# Patient Record
Sex: MALE | Race: WHITE | NOT HISPANIC OR LATINO | Employment: FULL TIME | URBAN - METROPOLITAN AREA
[De-identification: names, ages, dates, MRNs, and addresses within clinical notes are randomized per-mention and may not be internally consistent; named-entity substitution may affect disease eponyms.]

---

## 2017-12-04 ENCOUNTER — HOSPITAL ENCOUNTER (EMERGENCY)
Facility: HOSPITAL | Age: 47
Discharge: HOME | End: 2017-12-04
Attending: EMERGENCY MEDICINE | Admitting: EMERGENCY MEDICINE
Payer: COMMERCIAL

## 2017-12-04 ENCOUNTER — APPOINTMENT (EMERGENCY)
Dept: RADIOLOGY | Facility: HOSPITAL | Age: 47
End: 2017-12-04
Payer: COMMERCIAL

## 2017-12-04 VITALS
OXYGEN SATURATION: 97 % | DIASTOLIC BLOOD PRESSURE: 97 MMHG | SYSTOLIC BLOOD PRESSURE: 186 MMHG | RESPIRATION RATE: 18 BRPM | TEMPERATURE: 98 F | HEART RATE: 85 BPM

## 2017-12-04 DIAGNOSIS — S93.601A SPRAIN OF RIGHT FOOT, INITIAL ENCOUNTER: Primary | ICD-10-CM

## 2017-12-04 PROCEDURE — 73630 X-RAY EXAM OF FOOT: CPT

## 2017-12-04 PROCEDURE — 99283 EMERGENCY DEPT VISIT LOW MDM: CPT

## 2017-12-04 RX ORDER — IBUPROFEN 400 MG/1
400 TABLET ORAL ONCE
Status: DISCONTINUED | OUTPATIENT
Start: 2017-12-04 | End: 2017-12-04 | Stop reason: HOSPADM

## 2017-12-04 NOTE — DISCHARGE INSTRUCTIONS
Foot Sprain   WHAT YOU NEED TO KNOW:   A foot sprain is caused by a stretched or torn ligament in the foot or toe  Ligaments are tough tissues that connect bones  DISCHARGE INSTRUCTIONS:   Seek care immediately if:   · You have numbness or tingling below the injury, such as in your toes  · The skin on your injured foot is blue or pale  · You have increased pain, even after you take pain medicine  Contact your healthcare provider if:   · You have new weakness in your foot  · You have new or increased swelling in your foot  · You have new or increased stiffness when you move your injured foot  · You have questions or concerns about your condition or care  Medicines:   · NSAIDs , such as ibuprofen, help decrease swelling, pain, and fever  This medicine is available with or without a doctor's order  NSAIDs can cause stomach bleeding or kidney problems in certain people  If you take blood thinner medicine, always ask if NSAIDs are safe for you  Always read the medicine label and follow directions  Do not give these medicines to children under 10months of age without direction from your child's healthcare provider  · Take your medicine as directed  Contact your healthcare provider if you think your medicine is not helping or if you have side effects  Tell him of her if you are allergic to any medicine  Keep a list of the medicines, vitamins, and herbs you take  Include the amounts, and when and why you take them  Bring the list or the pill bottles to follow-up visits  Carry your medicine list with you in case of an emergency  Self-care:   · Rest your foot  Limit movement in your sprained foot for the first 2 to 3 days  You might need crutches to take weight off your injured foot as it heals  Use crutches as directed  · Apply ice  on your foot for 15 to 20 minutes every hour or as directed  Use an ice pack, or put crushed ice in a plastic bag  Cover it with a towel   Ice helps prevent tissue damage and decreases swelling and pain  · Compress your foot  You may need to use tape or an elastic bandage to support your foot if you have a mild sprain  You may need a splint on your foot for support if your sprain is severe  Wear your splint for as many days as directed  · Elevate your foot  above the level of your heart as often as you can  This will help decrease swelling and pain  Prop your foot on pillows or blankets to keep it elevated comfortably  Exercise your foot:  You may be given exercises to improve your strength and to help decrease stiffness  The exercises and physical therapy can help restore strength and increase the range of motion in your foot  Ask your healthcare provider when you can return to your normal activities or play sports  Prevent another foot sprain:   · Warm up and stretch before you exercise  · Do not exercise when you feel pain or are tired  · Wear equipment to protect yourself when you play sports  Follow up with your healthcare provider as directed:  Write down your questions so you remember to ask them during your visits  © 2017 [facility] Information is for End User's use only and may not be sold, redistributed or otherwise used for commercial purposes  All illustrations and images included in CareNotes® are the copyrighted property of A D A M [employer] Inc  or Philippe Ennis  The above information is an  only  It is not intended as medical advice for individual conditions or treatments  Talk to your doctor, nurse or pharmacist before following any medical regimen to see if it is safe and effective for you

## 2017-12-04 NOTE — ED PROVIDER NOTES
History  Chief Complaint   Patient presents with    Foot Injury     pt rolled his right foot this morning  Here with some pain  History provided by:  Patient and relative  Foot Injury - Major   Location:  Foot  Time since incident:  2 hours  Injury: yes    Mechanism of injury comment:  Walking, took a wrong step, inversion injury of the foot  Foot location:  R foot  Pain details:     Quality:  Dull    Radiates to:  Does not radiate    Severity:  Moderate    Onset quality:  Sudden    Duration:  2 hours    Timing:  Constant    Progression:  Improving  Chronicity:  New  Dislocation: no    Prior injury to area:  No  Relieved by:  Nothing  Worsened by:  Bearing weight and activity  Ineffective treatments:  None tried  Associated symptoms: no decreased ROM, no fever, no neck pain, no numbness and no stiffness        None       Past Medical History:   Diagnosis Date    Hypertension        History reviewed  No pertinent surgical history  History reviewed  No pertinent family history  I have reviewed and agree with the history as documented  Social History   Substance Use Topics    Smoking status: Current Every Day Smoker    Smokeless tobacco: Never Used    Alcohol use Yes        Review of Systems   Constitutional: Negative for fever  Respiratory: Negative for chest tightness and shortness of breath  Cardiovascular: Negative for chest pain  Musculoskeletal: Negative for neck pain and stiffness  Skin: Negative for rash  Neurological: Negative for dizziness, light-headedness and headaches         Physical Exam  ED Triage Vitals [12/04/17 0919]   Temperature Pulse Respirations Blood Pressure SpO2   98 °F (36 7 °C) 85 18 (!) 186/97 97 %      Temp Source Heart Rate Source Patient Position - Orthostatic VS BP Location FiO2 (%)   Oral Monitor Sitting Left arm --      Pain Score       9           Orthostatic Vital Signs  Vitals:    12/04/17 0919   BP: (!) 186/97   Pulse: 85   Patient Position - Orthostatic VS: Sitting       Physical Exam   Constitutional: He appears well-developed  HENT:   Head: Atraumatic  Eyes: Conjunctivae are normal  Right eye exhibits no discharge  Left eye exhibits no discharge  No scleral icterus  Neck: Neck supple  No tracheal deviation present  Pulmonary/Chest: Effort normal  No stridor  No respiratory distress  Musculoskeletal: He exhibits no deformity  Swelling ecchymosis and tenderness along lateral aspect of right foot, no focal bony tenderness only tender over ecchymotic area, no malleolar tenderness no proximal fibular tenderness   Neurological: He is alert  He exhibits normal muscle tone  Coordination normal    Skin: Skin is warm and dry  No rash noted  No erythema  No pallor  Psychiatric: He has a normal mood and affect  Vitals reviewed  ED Medications  Medications - No data to display    Diagnostic Studies  Results Reviewed     None                 XR foot 3+ views RIGHT   Final Result by Matt Roca MD (12/04 1710)      No acute osseous abnormality           Workstation performed: [id number]                    Procedures  Procedures       Phone Contacts  ED Phone Contact    ED Course  ED Course                                MDM  Number of Diagnoses or Management Options  Sprain of right foot, initial encounter: new and requires workup  Diagnosis management comments:  50-year-old male, inversion injury to the foot a few hours prior to arrival, large amount of swelling and ecchymosis, will x-ray to evaluate for fracture       Amount and/or Complexity of Data Reviewed  Tests in the radiology section of CPT®: ordered and reviewed  Decide to obtain previous medical records or to obtain history from someone other than the patient: yes  Obtain history from someone other than the patient: yes  Review and summarize past medical records: yes  Independent visualization of images, tracings, or specimens: yes      CritCare Time    Disposition  Final diagnoses: Sprain of right foot, initial encounter     Time reflects when diagnosis was documented in both MDM as applicable and the Disposition within this note     Time User Action Codes Description Comment    12/4/2017 10:03 AM Mariak Morrissey 206 Sprain of right foot, initial encounter       ED Disposition     ED Disposition Condition Comment    Discharge  [facility] discharge to home/self care  Condition at discharge: Good        Follow-up Information    None       There are no discharge medications for this patient  No discharge procedures on file      ED Provider  Electronically Signed by           Isabel Short DO  12/04/17 5818

## 2017-12-13 ENCOUNTER — HOSPITAL ENCOUNTER (INPATIENT)
Facility: HOSPITAL | Age: 47
LOS: 1 days | Discharge: HOME | DRG: 439 | End: 2017-12-15
Attending: EMERGENCY MEDICINE | Admitting: INTERNAL MEDICINE
Payer: COMMERCIAL

## 2017-12-13 ENCOUNTER — APPOINTMENT (EMERGENCY)
Dept: RADIOLOGY | Facility: HOSPITAL | Age: 47
DRG: 439 | End: 2017-12-13
Payer: COMMERCIAL

## 2017-12-13 ENCOUNTER — APPOINTMENT (EMERGENCY)
Dept: CT IMAGING | Facility: HOSPITAL | Age: 47
DRG: 439 | End: 2017-12-13
Payer: COMMERCIAL

## 2017-12-13 DIAGNOSIS — E87.1 HYPONATREMIA: ICD-10-CM

## 2017-12-13 DIAGNOSIS — K85.90 PANCREATITIS: Primary | ICD-10-CM

## 2017-12-13 DIAGNOSIS — K62.5 RECTAL BLEEDING: ICD-10-CM

## 2017-12-13 DIAGNOSIS — K52.9 ENTERITIS: ICD-10-CM

## 2017-12-13 DIAGNOSIS — K29.80 DUODENITIS: ICD-10-CM

## 2017-12-13 LAB
ALBUMIN SERPL BCP-MCNC: 3.7 G/DL (ref 3.5–5)
ALP SERPL-CCNC: 103 U/L (ref 46–116)
ALT SERPL W P-5'-P-CCNC: 33 U/L (ref 12–78)
ANION GAP SERPL CALCULATED.3IONS-SCNC: 12 MMOL/L (ref 4–13)
APTT PPP: 33 SECONDS (ref 23–35)
AST SERPL W P-5'-P-CCNC: 21 U/L (ref 5–45)
BACTERIA UR QL AUTO: ABNORMAL /HPF
BASOPHILS # BLD AUTO: 0.04 THOUSANDS/ΜL (ref 0–0.1)
BASOPHILS NFR BLD AUTO: 0 % (ref 0–1)
BILIRUB SERPL-MCNC: 1 MG/DL (ref 0.2–1)
BILIRUB UR QL STRIP: ABNORMAL
BUN SERPL-MCNC: 11 MG/DL (ref 5–25)
CALCIUM SERPL-MCNC: 8.6 MG/DL (ref 8.3–10.1)
CHLORIDE SERPL-SCNC: 89 MMOL/L (ref 100–108)
CK SERPL-CCNC: 95 U/L (ref 39–308)
CLARITY UR: CLEAR
CO2 SERPL-SCNC: 23 MMOL/L (ref 21–32)
COLOR UR: YELLOW
CREAT SERPL-MCNC: 0.81 MG/DL (ref 0.6–1.3)
EOSINOPHIL # BLD AUTO: 0.09 THOUSAND/ΜL (ref 0–0.61)
EOSINOPHIL NFR BLD AUTO: 1 % (ref 0–6)
ERYTHROCYTE [DISTWIDTH] IN BLOOD BY AUTOMATED COUNT: 13.2 % (ref 11.6–15.1)
ETHANOL SERPL-MCNC: <3 MG/DL (ref 0–3)
GFR SERPL CREATININE-BSD FRML MDRD: 107 ML/MIN/1.73SQ M
GLUCOSE SERPL-MCNC: 128 MG/DL (ref 65–140)
GLUCOSE UR STRIP-MCNC: NEGATIVE MG/DL
HCT VFR BLD AUTO: 44.2 % (ref 36.5–49.3)
HGB BLD-MCNC: 15.2 G/DL (ref 12–17)
HGB UR QL STRIP.AUTO: ABNORMAL
INR PPP: 0.88 (ref 0.86–1.16)
KETONES UR STRIP-MCNC: NEGATIVE MG/DL
LACTATE SERPL-SCNC: <0.3 MMOL/L (ref 0.5–2)
LEUKOCYTE ESTERASE UR QL STRIP: NEGATIVE
LIPASE SERPL-CCNC: 425 U/L (ref 73–393)
LYMPHOCYTES # BLD AUTO: 1.95 THOUSANDS/ΜL (ref 0.6–4.47)
LYMPHOCYTES NFR BLD AUTO: 14 % (ref 14–44)
MCH RBC QN AUTO: 31.4 PG (ref 26.8–34.3)
MCHC RBC AUTO-ENTMCNC: 34.4 G/DL (ref 31.4–37.4)
MCV RBC AUTO: 91 FL (ref 82–98)
MONOCYTES # BLD AUTO: 0.78 THOUSAND/ΜL (ref 0.17–1.22)
MONOCYTES NFR BLD AUTO: 6 % (ref 4–12)
MUCOUS THREADS UR QL AUTO: ABNORMAL
NEUTROPHILS # BLD AUTO: 11.11 THOUSANDS/ΜL (ref 1.85–7.62)
NEUTS SEG NFR BLD AUTO: 79 % (ref 43–75)
NITRITE UR QL STRIP: NEGATIVE
NON-SQ EPI CELLS URNS QL MICRO: ABNORMAL /HPF
PH UR STRIP.AUTO: 5.5 [PH] (ref 4.5–8)
PLATELET # BLD AUTO: 283 THOUSANDS/UL (ref 149–390)
PMV BLD AUTO: 10.1 FL (ref 8.9–12.7)
POTASSIUM SERPL-SCNC: 3.3 MMOL/L (ref 3.5–5.3)
PROT SERPL-MCNC: 8.2 G/DL (ref 6.4–8.2)
PROT UR STRIP-MCNC: ABNORMAL MG/DL
PROTHROMBIN TIME: 12.2 SECONDS (ref 12.1–14.4)
RBC # BLD AUTO: 4.84 MILLION/UL (ref 3.88–5.62)
RBC #/AREA URNS AUTO: ABNORMAL /HPF
SODIUM SERPL-SCNC: 124 MMOL/L (ref 136–145)
SP GR UR STRIP.AUTO: >=1.03 (ref 1–1.03)
UROBILINOGEN UR QL STRIP.AUTO: 0.2 E.U./DL
WBC # BLD AUTO: 13.97 THOUSAND/UL (ref 4.31–10.16)
WBC #/AREA URNS AUTO: ABNORMAL /HPF

## 2017-12-13 PROCEDURE — 74177 CT ABD & PELVIS W/CONTRAST: CPT

## 2017-12-13 PROCEDURE — 80053 COMPREHEN METABOLIC PANEL: CPT | Performed by: EMERGENCY MEDICINE

## 2017-12-13 PROCEDURE — 85730 THROMBOPLASTIN TIME PARTIAL: CPT | Performed by: EMERGENCY MEDICINE

## 2017-12-13 PROCEDURE — 71020 HB CHEST X-RAY 2VW FRONTAL&LATL: CPT

## 2017-12-13 PROCEDURE — 93005 ELECTROCARDIOGRAM TRACING: CPT

## 2017-12-13 PROCEDURE — 82550 ASSAY OF CK (CPK): CPT | Performed by: EMERGENCY MEDICINE

## 2017-12-13 PROCEDURE — 83690 ASSAY OF LIPASE: CPT | Performed by: EMERGENCY MEDICINE

## 2017-12-13 PROCEDURE — 36415 COLL VENOUS BLD VENIPUNCTURE: CPT | Performed by: EMERGENCY MEDICINE

## 2017-12-13 PROCEDURE — 81001 URINALYSIS AUTO W/SCOPE: CPT | Performed by: EMERGENCY MEDICINE

## 2017-12-13 PROCEDURE — 96374 THER/PROPH/DIAG INJ IV PUSH: CPT

## 2017-12-13 PROCEDURE — 83605 ASSAY OF LACTIC ACID: CPT | Performed by: EMERGENCY MEDICINE

## 2017-12-13 PROCEDURE — 85610 PROTHROMBIN TIME: CPT | Performed by: EMERGENCY MEDICINE

## 2017-12-13 PROCEDURE — 85025 COMPLETE CBC W/AUTO DIFF WBC: CPT | Performed by: EMERGENCY MEDICINE

## 2017-12-13 PROCEDURE — 96361 HYDRATE IV INFUSION ADD-ON: CPT

## 2017-12-13 PROCEDURE — 96375 TX/PRO/DX INJ NEW DRUG ADDON: CPT

## 2017-12-13 PROCEDURE — 84300 ASSAY OF URINE SODIUM: CPT | Performed by: PHYSICIAN ASSISTANT

## 2017-12-13 PROCEDURE — 80320 DRUG SCREEN QUANTALCOHOLS: CPT | Performed by: EMERGENCY MEDICINE

## 2017-12-13 PROCEDURE — 93005 ELECTROCARDIOGRAM TRACING: CPT | Performed by: EMERGENCY MEDICINE

## 2017-12-13 RX ORDER — SODIUM CHLORIDE 9 MG/ML
125 INJECTION, SOLUTION INTRAVENOUS CONTINUOUS
Status: DISCONTINUED | OUTPATIENT
Start: 2017-12-13 | End: 2017-12-14 | Stop reason: HOSPADM

## 2017-12-13 RX ORDER — ONDANSETRON 2 MG/ML
4 INJECTION INTRAMUSCULAR; INTRAVENOUS ONCE
Status: COMPLETED | OUTPATIENT
Start: 2017-12-13 | End: 2017-12-13

## 2017-12-13 RX ADMIN — HYDROMORPHONE HYDROCHLORIDE 0.5 MG: 1 INJECTION, SOLUTION INTRAMUSCULAR; INTRAVENOUS; SUBCUTANEOUS at 22:38

## 2017-12-13 RX ADMIN — ONDANSETRON 4 MG: 2 INJECTION INTRAMUSCULAR; INTRAVENOUS at 22:36

## 2017-12-13 RX ADMIN — SODIUM CHLORIDE 1000 ML: 0.9 INJECTION, SOLUTION INTRAVENOUS at 22:36

## 2017-12-13 NOTE — LETTER
December 15, 2017     Patient: Pham Overton [facility]   YOB: 1970   Date of Visit: 12/13/2017       To Whom It May Concern:    Please note, this patient was admitted from 12/13/17-12/15/17 and did receive short acting narcotics during this hospitalization for acute pain  If you have any questions or concerns, please don't hesitate to call           Sincerely,                  Tanya West PA-C

## 2017-12-14 ENCOUNTER — APPOINTMENT (INPATIENT)
Dept: ULTRASOUND IMAGING | Facility: HOSPITAL | Age: 47
DRG: 439 | End: 2017-12-14
Payer: COMMERCIAL

## 2017-12-14 PROBLEM — E87.1 HYPONATREMIA: Status: ACTIVE | Noted: 2017-12-14

## 2017-12-14 PROBLEM — K29.80 DUODENITIS: Status: ACTIVE | Noted: 2017-12-14

## 2017-12-14 PROBLEM — K52.9 ENTERITIS: Status: ACTIVE | Noted: 2017-12-14

## 2017-12-14 PROBLEM — K85.90 ACUTE PANCREATITIS: Status: ACTIVE | Noted: 2017-12-14

## 2017-12-14 LAB
ALBUMIN SERPL BCP-MCNC: 3.2 G/DL (ref 3.5–5)
ALP SERPL-CCNC: 97 U/L (ref 46–116)
ALT SERPL W P-5'-P-CCNC: 28 U/L (ref 12–78)
ANION GAP SERPL CALCULATED.3IONS-SCNC: 10 MMOL/L (ref 4–13)
AST SERPL W P-5'-P-CCNC: 18 U/L (ref 5–45)
ATRIAL RATE: 89 BPM
BILIRUB SERPL-MCNC: 0.8 MG/DL (ref 0.2–1)
BUN SERPL-MCNC: 8 MG/DL (ref 5–25)
CALCIUM SERPL-MCNC: 8.1 MG/DL (ref 8.3–10.1)
CHLORIDE SERPL-SCNC: 95 MMOL/L (ref 100–108)
CO2 SERPL-SCNC: 24 MMOL/L (ref 21–32)
CORTIS AM PEAK SERPL-MCNC: 26.2 UG/DL (ref 4.2–22.4)
CREAT SERPL-MCNC: 0.77 MG/DL (ref 0.6–1.3)
ERYTHROCYTE [DISTWIDTH] IN BLOOD BY AUTOMATED COUNT: 13.3 % (ref 11.6–15.1)
GFR SERPL CREATININE-BSD FRML MDRD: 109 ML/MIN/1.73SQ M
GLUCOSE SERPL-MCNC: 123 MG/DL (ref 65–140)
HCT VFR BLD AUTO: 43.3 % (ref 36.5–49.3)
HGB BLD-MCNC: 14.6 G/DL (ref 12–17)
LIPASE SERPL-CCNC: 361 U/L (ref 73–393)
MCH RBC QN AUTO: 31.3 PG (ref 26.8–34.3)
MCHC RBC AUTO-ENTMCNC: 33.7 G/DL (ref 31.4–37.4)
MCV RBC AUTO: 93 FL (ref 82–98)
OSMOLALITY UR/SERPL-RTO: 290 MMOL/KG (ref 282–298)
P AXIS: 55 DEGREES
PLATELET # BLD AUTO: 269 THOUSANDS/UL (ref 149–390)
PMV BLD AUTO: 10.3 FL (ref 8.9–12.7)
POTASSIUM SERPL-SCNC: 3.2 MMOL/L (ref 3.5–5.3)
PR INTERVAL: 156 MS
PROT SERPL-MCNC: 7.4 G/DL (ref 6.4–8.2)
QRS AXIS: 38 DEGREES
QRSD INTERVAL: 96 MS
QT INTERVAL: 518 MS
QTC INTERVAL: 617 MS
RBC # BLD AUTO: 4.66 MILLION/UL (ref 3.88–5.62)
SODIUM 24H UR-SCNC: 45 MOL/L
SODIUM SERPL-SCNC: 129 MMOL/L (ref 136–145)
T WAVE AXIS: 44 DEGREES
VENTRICULAR RATE: 85 BPM
WBC # BLD AUTO: 12.76 THOUSAND/UL (ref 4.31–10.16)

## 2017-12-14 PROCEDURE — 80053 COMPREHEN METABOLIC PANEL: CPT | Performed by: PHYSICIAN ASSISTANT

## 2017-12-14 PROCEDURE — 87040 BLOOD CULTURE FOR BACTERIA: CPT | Performed by: EMERGENCY MEDICINE

## 2017-12-14 PROCEDURE — 83690 ASSAY OF LIPASE: CPT | Performed by: PHYSICIAN ASSISTANT

## 2017-12-14 PROCEDURE — 96376 TX/PRO/DX INJ SAME DRUG ADON: CPT

## 2017-12-14 PROCEDURE — 85027 COMPLETE CBC AUTOMATED: CPT | Performed by: PHYSICIAN ASSISTANT

## 2017-12-14 PROCEDURE — 36415 COLL VENOUS BLD VENIPUNCTURE: CPT | Performed by: EMERGENCY MEDICINE

## 2017-12-14 PROCEDURE — 99285 EMERGENCY DEPT VISIT HI MDM: CPT

## 2017-12-14 PROCEDURE — 82533 TOTAL CORTISOL: CPT | Performed by: PHYSICIAN ASSISTANT

## 2017-12-14 PROCEDURE — C9113 INJ PANTOPRAZOLE SODIUM, VIA: HCPCS | Performed by: EMERGENCY MEDICINE

## 2017-12-14 PROCEDURE — 83930 ASSAY OF BLOOD OSMOLALITY: CPT | Performed by: PHYSICIAN ASSISTANT

## 2017-12-14 PROCEDURE — 76705 ECHO EXAM OF ABDOMEN: CPT

## 2017-12-14 RX ORDER — SENNOSIDES 8.6 MG
1 TABLET ORAL DAILY
Status: DISCONTINUED | OUTPATIENT
Start: 2017-12-14 | End: 2017-12-15 | Stop reason: HOSPADM

## 2017-12-14 RX ORDER — ACETAMINOPHEN 325 MG/1
650 TABLET ORAL EVERY 6 HOURS PRN
Status: DISCONTINUED | OUTPATIENT
Start: 2017-12-14 | End: 2017-12-14 | Stop reason: SDUPTHER

## 2017-12-14 RX ORDER — ATORVASTATIN CALCIUM 10 MG/1
10 TABLET, FILM COATED ORAL DAILY
COMMUNITY

## 2017-12-14 RX ORDER — PANTOPRAZOLE SODIUM 40 MG/1
40 INJECTION, POWDER, FOR SOLUTION INTRAVENOUS ONCE
Status: COMPLETED | OUTPATIENT
Start: 2017-12-14 | End: 2017-12-14

## 2017-12-14 RX ORDER — LANSOPRAZOLE 30 MG/1
25 CAPSULE, DELAYED RELEASE ORAL DAILY
COMMUNITY

## 2017-12-14 RX ORDER — CALCIUM CARBONATE 200(500)MG
1000 TABLET,CHEWABLE ORAL DAILY PRN
Status: DISCONTINUED | OUTPATIENT
Start: 2017-12-14 | End: 2017-12-15 | Stop reason: HOSPADM

## 2017-12-14 RX ORDER — LISINOPRIL AND HYDROCHLOROTHIAZIDE 25; 20 MG/1; MG/1
1 TABLET ORAL DAILY
COMMUNITY
End: 2017-12-15 | Stop reason: HOSPADM

## 2017-12-14 RX ORDER — POTASSIUM CHLORIDE AND SODIUM CHLORIDE 900; 300 MG/100ML; MG/100ML
125 INJECTION, SOLUTION INTRAVENOUS CONTINUOUS
Status: DISCONTINUED | OUTPATIENT
Start: 2017-12-14 | End: 2017-12-14

## 2017-12-14 RX ORDER — ACETAMINOPHEN 325 MG/1
650 TABLET ORAL EVERY 6 HOURS PRN
Status: DISCONTINUED | OUTPATIENT
Start: 2017-12-14 | End: 2017-12-15 | Stop reason: HOSPADM

## 2017-12-14 RX ORDER — ATORVASTATIN CALCIUM 10 MG/1
10 TABLET, FILM COATED ORAL
Status: DISCONTINUED | OUTPATIENT
Start: 2017-12-14 | End: 2017-12-15 | Stop reason: HOSPADM

## 2017-12-14 RX ORDER — OXYCODONE HYDROCHLORIDE 5 MG/1
5 TABLET ORAL EVERY 4 HOURS PRN
Status: DISCONTINUED | OUTPATIENT
Start: 2017-12-14 | End: 2017-12-15 | Stop reason: HOSPADM

## 2017-12-14 RX ORDER — CIPROFLOXACIN 2 MG/ML
400 INJECTION, SOLUTION INTRAVENOUS ONCE
Status: COMPLETED | OUTPATIENT
Start: 2017-12-14 | End: 2017-12-14

## 2017-12-14 RX ORDER — LISINOPRIL 20 MG/1
20 TABLET ORAL DAILY
Status: DISCONTINUED | OUTPATIENT
Start: 2017-12-14 | End: 2017-12-15 | Stop reason: HOSPADM

## 2017-12-14 RX ORDER — NICOTINE 21 MG/24HR
1 PATCH, TRANSDERMAL 24 HOURS TRANSDERMAL DAILY
Status: DISCONTINUED | OUTPATIENT
Start: 2017-12-14 | End: 2017-12-15 | Stop reason: HOSPADM

## 2017-12-14 RX ORDER — TRAMADOL HYDROCHLORIDE 50 MG/1
50 TABLET ORAL EVERY 6 HOURS PRN
Status: DISCONTINUED | OUTPATIENT
Start: 2017-12-14 | End: 2017-12-15 | Stop reason: HOSPADM

## 2017-12-14 RX ORDER — ONDANSETRON 2 MG/ML
4 INJECTION INTRAMUSCULAR; INTRAVENOUS EVERY 4 HOURS PRN
Status: DISCONTINUED | OUTPATIENT
Start: 2017-12-14 | End: 2017-12-15 | Stop reason: HOSPADM

## 2017-12-14 RX ORDER — ONDANSETRON 2 MG/ML
4 INJECTION INTRAMUSCULAR; INTRAVENOUS EVERY 6 HOURS PRN
Status: DISCONTINUED | OUTPATIENT
Start: 2017-12-14 | End: 2017-12-14 | Stop reason: SDUPTHER

## 2017-12-14 RX ORDER — DICYCLOMINE HYDROCHLORIDE 10 MG/1
20 CAPSULE ORAL
Status: DISCONTINUED | OUTPATIENT
Start: 2017-12-14 | End: 2017-12-15 | Stop reason: HOSPADM

## 2017-12-14 RX ORDER — SODIUM CHLORIDE 9 MG/ML
125 INJECTION, SOLUTION INTRAVENOUS CONTINUOUS
Status: DISCONTINUED | OUTPATIENT
Start: 2017-12-14 | End: 2017-12-14

## 2017-12-14 RX ADMIN — OXYCODONE HYDROCHLORIDE 5 MG: 5 TABLET ORAL at 20:52

## 2017-12-14 RX ADMIN — PANTOPRAZOLE SODIUM 40 MG: 40 INJECTION, POWDER, FOR SOLUTION INTRAVENOUS at 01:27

## 2017-12-14 RX ADMIN — DICYCLOMINE HYDROCHLORIDE 20 MG: 10 CAPSULE ORAL at 15:44

## 2017-12-14 RX ADMIN — SENNOSIDES 8.6 MG: 8.6 TABLET, FILM COATED ORAL at 08:00

## 2017-12-14 RX ADMIN — OXYCODONE HYDROCHLORIDE 5 MG: 5 TABLET ORAL at 08:00

## 2017-12-14 RX ADMIN — IOHEXOL 100 ML: 350 INJECTION, SOLUTION INTRAVENOUS at 00:12

## 2017-12-14 RX ADMIN — HYDROMORPHONE HYDROCHLORIDE 0.5 MG: 1 INJECTION, SOLUTION INTRAMUSCULAR; INTRAVENOUS; SUBCUTANEOUS at 11:33

## 2017-12-14 RX ADMIN — OXYCODONE HYDROCHLORIDE 5 MG: 5 TABLET ORAL at 14:45

## 2017-12-14 RX ADMIN — HYDROMORPHONE HYDROCHLORIDE 0.5 MG: 1 INJECTION, SOLUTION INTRAMUSCULAR; INTRAVENOUS; SUBCUTANEOUS at 17:54

## 2017-12-14 RX ADMIN — CIPROFLOXACIN 400 MG: 2 INJECTION, SOLUTION INTRAVENOUS at 02:05

## 2017-12-14 RX ADMIN — LISINOPRIL 20 MG: 20 TABLET ORAL at 14:42

## 2017-12-14 RX ADMIN — OXYCODONE HYDROCHLORIDE 5 MG: 5 TABLET ORAL at 02:42

## 2017-12-14 RX ADMIN — DICYCLOMINE HYDROCHLORIDE 20 MG: 10 CAPSULE ORAL at 21:53

## 2017-12-14 RX ADMIN — DICYCLOMINE HYDROCHLORIDE 20 MG: 10 CAPSULE ORAL at 06:03

## 2017-12-14 RX ADMIN — METOPROLOL TARTRATE 25 MG: 25 TABLET ORAL at 20:52

## 2017-12-14 RX ADMIN — METRONIDAZOLE 500 MG: 500 INJECTION, SOLUTION INTRAVENOUS at 01:34

## 2017-12-14 RX ADMIN — METOPROLOL TARTRATE 25 MG: 25 TABLET ORAL at 14:42

## 2017-12-14 RX ADMIN — HYDROMORPHONE HYDROCHLORIDE 0.5 MG: 1 INJECTION, SOLUTION INTRAMUSCULAR; INTRAVENOUS; SUBCUTANEOUS at 23:22

## 2017-12-14 RX ADMIN — DICYCLOMINE HYDROCHLORIDE 20 MG: 10 CAPSULE ORAL at 11:33

## 2017-12-14 RX ADMIN — HYDROMORPHONE HYDROCHLORIDE 0.5 MG: 1 INJECTION, SOLUTION INTRAMUSCULAR; INTRAVENOUS; SUBCUTANEOUS at 04:39

## 2017-12-14 RX ADMIN — HYDROMORPHONE HYDROCHLORIDE 1 MG: 1 INJECTION, SOLUTION INTRAMUSCULAR; INTRAVENOUS; SUBCUTANEOUS at 00:44

## 2017-12-14 RX ADMIN — ONDANSETRON 4 MG: 2 INJECTION INTRAMUSCULAR; INTRAVENOUS at 02:42

## 2017-12-14 RX ADMIN — ATORVASTATIN CALCIUM 10 MG: 10 TABLET, FILM COATED ORAL at 15:44

## 2017-12-14 RX ADMIN — SODIUM CHLORIDE 125 ML/HR: 0.9 INJECTION, SOLUTION INTRAVENOUS at 01:37

## 2017-12-14 RX ADMIN — POTASSIUM CHLORIDE AND SODIUM CHLORIDE 125 ML/HR: 900; 300 INJECTION, SOLUTION INTRAVENOUS at 10:11

## 2017-12-14 RX ADMIN — POTASSIUM CHLORIDE: 2 INJECTION, SOLUTION, CONCENTRATE INTRAVENOUS at 17:54

## 2017-12-14 NOTE — H&P
H&P- Rex Marvin 1970, 56 y o  male MRN: [id number]    Unit/Bed#: -01 Encounter: [id number]    Primary Care Provider: Jimmy Hernandez MD   Date and time admitted to hospital: 12/13/2017  9:08 PM    Acute pancreatitis   Assessment & Plan    · Mildly elevated lipase  · CT with some fat stranding of pancreatic head-- pancreatitis/duodenitis noted  · Clear liquid diet  · IVF  · Pain control         * Enteritis   Assessment & Plan    · Received Cipro + Flagyl in ED  · Patient also with BRBPR x1, which he states is new to him  · Repeat CBC in AM to monitor hgb  · Consider GI consultation        Hyponatremia   Assessment & Plan    ·  today, no previous levels; admits to vomiting today  · Has hx of excessive ETOH use, not ETOH in system today  · Check serum and urine studies  · IVF hydration  · Repeat BMP In AM         Hypertension   Assessment & Plan    · Continue home medications          VTE Prophylaxis: low risk, pharm PPX not needed  / sequential compression device   Code Status: Level 1-- Full Code  POLST: POLST form is not discussed and not completed at this time  Anticipated Length of Stay:  Patient will be admitted on an Inpatient basis with an anticipated length of stay of  > 2 midnights  Justification for Hospital Stay: IVF, monitor GI bleeding    Total Time for Visit, including Counseling / Coordination of Care: 30 minutes  Greater than 50% of this total time spent on direct patient counseling and coordination of care  Chief Complaint:   Abdominal pain     History of Present Illness:    Rex Marvin is a 56 y o  male with past medical history significant hypertension hyperlipidemia presents the ED for evaluation of abdominal pain  Patient reports that pain started at approximately 8:00 a m  yesterday  Patient reports pain carotid aware  Patient reports nausea and vomiting  Denies any diarrhea  Patient states pain is located in the mid to lower abdomen and sharp in nature  Patient reports improvement of pain with pain medications  Patient reports he did have 1 bloody bowel movement early in the day  He reports no previous history of bloody bowel movements  Patient denies any recent sick contacts  Denies eating any raw eggs/fish/meat  No prior history of abdominal surgery, does have an umbilical hernia he was told  Review of Systems:    Review of Systems   Constitutional: Negative  HENT: Negative  Eyes: Negative  Respiratory: Negative  Cardiovascular: Negative  Gastrointestinal: Positive for abdominal pain, blood in stool, nausea and vomiting  Genitourinary: Negative  Musculoskeletal: Negative  Skin: Negative  Neurological: Negative  Hematological: Negative  Psychiatric/Behavioral: Negative  Past Medical and Surgical History:     Past Medical History:   Diagnosis Date    Hypertension     Pancreatitis        History reviewed  No pertinent surgical history  Meds/Allergies:    Prior to Admission medications    Medication Sig Start Date End Date Taking? Authorizing Provider   lansoprazole (PREVACID) 30 mg capsule Take 25 mg by mouth daily   Yes Historical Provider, MD     I have reviewed home medications with patient personally      Allergies: No Known Allergies    Social History:     Marital Status: /Civil Union   Occupation: employee of [employer]  Patient Pre-hospital Living Situation: home with family  Patient Pre-hospital Level of Mobility: independent  Patient Pre-hospital Diet Restrictions: none  Substance Use History:   History   Alcohol Use    Yes     Comment: "alot"     History   Smoking Status    Current Every Day Smoker    Packs/day: 1 00    Types: Cigarettes   Smokeless Tobacco    Never Used     History   Drug Use No       Family History:    non-contributory    Physical Exam:     Vitals:   Blood Pressure: 168/94 (12/14/17 0225)  Pulse: 78 (12/14/17 0225)  Temperature: 97 8 °F (36 6 °C) (12/14/17 0225)  Temp Source: Oral (12/14/17 0225)  Respirations: 16 (12/14/17 0225)  Height: 6' 4" (193 cm) (12/14/17 0225)  Weight - Scale: 107 kg (236 lb) (12/14/17 0225)  SpO2: 94 % (12/14/17 0225)    Physical Exam   Constitutional: He is oriented to person, place, and time  He appears well-developed and well-nourished  No distress  HENT:   Head: Normocephalic and atraumatic  Mouth/Throat: No oropharyngeal exudate  Eyes: Conjunctivae and EOM are normal  Pupils are equal, round, and reactive to light  No scleral icterus  Neck: No JVD present  Cardiovascular: Normal rate and regular rhythm  Exam reveals no gallop and no friction rub  No murmur heard  Pulmonary/Chest: Effort normal and breath sounds normal  No respiratory distress  He has no wheezes  He has no rales  Abdominal: Soft  Bowel sounds are normal  He exhibits no distension  There is tenderness (diffuse, mild)  There is no rebound and no guarding  Musculoskeletal: He exhibits no edema or tenderness  Neurological: He is alert and oriented to person, place, and time  He displays normal reflexes  No cranial nerve deficit  He exhibits normal muscle tone  Skin: Skin is warm and dry  No rash noted  He is not diaphoretic  No erythema  Psychiatric: He has a normal mood and affect  His behavior is normal        Additional Data:     Lab Results: I have personally reviewed pertinent reports          Results from last 7 days  Lab Units 12/13/17  2234   WBC Thousand/uL 13 97*   HEMOGLOBIN g/dL 15 2   HEMATOCRIT % 44 2   PLATELETS Thousands/uL 283   NEUTROS PCT % 79*   LYMPHS PCT % 14   MONOS PCT % 6   EOS PCT % 1       Results from last 7 days  Lab Units 12/13/17  2234   SODIUM mmol/L 124*   POTASSIUM mmol/L 3 3*   CHLORIDE mmol/L 89*   CO2 mmol/L 23   BUN mg/dL 11   CREATININE mg/dL 0 81   CALCIUM mg/dL 8 6   TOTAL PROTEIN g/dL 8 2   BILIRUBIN TOTAL mg/dL 1 00   ALK PHOS U/L 103   ALT U/L 33   AST U/L 21   GLUCOSE RANDOM mg/dL 128       Results from last 7 days  Lab Units 12/13/17  2234   INR  0 88       Imaging: I have personally reviewed pertinent reports  Xr Chest 2 Views    Result Date: 12/13/2017  Narrative: CHEST - DUAL ENERGY INDICATION:  Abdominal pain  COMPARISON:  None VIEWS:  PA (including soft tissue/bone algorithms) and lateral projections IMAGES:  5 FINDINGS:     Cardiomediastinal silhouette appears unremarkable  The lungs are clear  No pneumothorax or pleural effusion  Visualized osseous structures appear within normal limits for the patient's age  Impression: No active pulmonary disease  Workstation performed: [id number]     Xr Foot 3+ Views Right    Result Date: 12/4/2017  Narrative: RIGHT FOOT INDICATION:  Right foot pain  Lateral pain and swelling  COMPARISON: None VIEWS:  3 IMAGES:  3 FINDINGS: There is no acute fracture or dislocation  Small plantar calcaneal spur noted  No degenerative changes  No lytic or blastic lesions are seen  Soft tissues are unremarkable  Impression: No acute osseous abnormality  Workstation performed: [id number]       EKG, Pathology, and Other Studies Reviewed on Admission:   · EKG: NSR    Allscripts Records Reviewed: No     ** Please Note: Dragon 360 Dictation voice to text software may have been used in the creation of this document   **

## 2017-12-14 NOTE — ED NOTES
Patient transported to x-ray    Daily Kendall RN  12/13/17 [id number] [facility] 72, 4666 [facility]  12/13/17 3214

## 2017-12-14 NOTE — CONSULTS
Consultation - 126 [facility]  Gelacio Raina 56 y o  male MRN: [id number]  Unit/Bed#: -01 Encounter: [id number]        Consults    Reason for Consult / Principal Problem: acute pancreatitis    ASSESSMENT and PLAN:    Principal Problem:    Enteritis  Active Problems:    Hypertension    Hyponatremia    Acute pancreatitis    #1  Acute pancreatitis, likely alcohol related: Lipase normalized but has hx of pancreatitis in the past due to alcohol several years ago and has signs of pancreatitis on imaging  Suspect lipase may have peaked prior to labs  WBC slightly elevated at 12 7 but afebrile   -Continue aggressive IVF hydration  -Clear liquid diet  -Continue to monitor LFTs  -Follow up US to confirm there is no biliary origin  -Pain control and antiemetics  -Alcohol abstinence    #2  Bloody bowel movement: no history of this before Monday  Hgb 14    -Continue to monitor hgb and transfuse as necessary  -Outpatient colonoscopy pending Hgb stable      -------------------------------------------------------------------------------------------------------------------    HPI: This is a 56year old male with a history of alcoholic pancreatitis and HTN who presents with abdominal pain since yesterday  He reports nausea and vomiting associated with the pain  He also reports he has been having bloody bowel movements the past few days  His last one was yesterday  He has no hx of prior bloody stools  He states his abdominal pain is currently improved  He has never had a colonoscopy before  He denies heartburn, constipation, or diarrhea on a regular basis  He is a heavy drinker on the weekends and has a history of pancreatitis secondary to alcohol several years ago  REVIEW OF SYSTEMS:    CONSTITUTIONAL: Denies any fever, chills, or rigors  Good appetite, and no recent weight loss  HEENT: No earache or tinnitus  Denies hearing loss or visual disturbances    CARDIOVASCULAR: No chest pain or palpitations  RESPIRATORY: Denies any cough, hemoptysis, shortness of breath or dyspnea on exertion  GASTROINTESTINAL: As noted in the History of Present Illness  GENITOURINARY: No problems with urination  Denies any hematuria or dysuria  NEUROLOGIC: No dizziness or vertigo, denies headaches  MUSCULOSKELETAL: Denies any muscle or joint pain  SKIN: Denies skin rashes or itching  ENDOCRINE: Denies excessive thirst  Denies intolerance to heat or cold  PSYCHOSOCIAL: Denies depression or anxiety  Denies any recent memory loss  Historical Information   Past Medical History:   Diagnosis Date    Hypertension     Pancreatitis      History reviewed  No pertinent surgical history  Social History   History   Alcohol Use    Yes     Comment: "alot"     History   Drug Use No     History   Smoking Status    Current Every Day Smoker    Packs/day: 1 00    Types: Cigarettes   Smokeless Tobacco    Never Used     History reviewed  No pertinent family history      Meds/Allergies     Prescriptions Prior to Admission   Medication    atorvastatin (LIPITOR) 10 mg tablet    lansoprazole (PREVACID) 30 mg capsule    lisinopril-hydrochlorothiazide (PRINZIDE,ZESTORETIC) 20-25 MG per tablet    metoprolol tartrate (LOPRESSOR) 25 mg tablet     Current Facility-Administered Medications   Medication Dose Route Frequency    acetaminophen (TYLENOL) tablet 650 mg  650 mg Oral Q6H PRN    atorvastatin (LIPITOR) tablet 10 mg  10 mg Oral Daily With Dinner    calcium carbonate (TUMS) chewable tablet 1,000 mg  1,000 mg Oral Daily PRN    dicyclomine (BENTYL) capsule 20 mg  20 mg Oral 4x Daily (AC & HS)    HYDROmorphone (DILAUDID) 1 mg/mL injection 0 5 mg  0 5 mg Intravenous Q4H PRN    lisinopril (ZESTRIL) tablet 20 mg  20 mg Oral Daily    metoprolol tartrate (LOPRESSOR) tablet 25 mg  25 mg Oral Q12H [address]    nicotine (NICODERM CQ) 21 mg/24 hr TD 24 hr patch 1 patch  1 patch Transdermal Daily    ondansetron (ZOFRAN) injection 4 mg  4 mg Intravenous Q4H PRN    oxyCODONE (ROXICODONE) IR tablet 5 mg  5 mg Oral Q4H PRN    potassium chloride 40 mEq in sodium chloride 0 9 % 1,000 mL infusion   Intravenous Continuous    senna (SENOKOT) tablet 8 6 mg  1 tablet Oral Daily    traMADol (ULTRAM) tablet 50 mg  50 mg Oral Q6H PRN       No Known Allergies        Objective     Blood pressure (!) 199/116, pulse 89, temperature 97 9 °F (36 6 °C), temperature source Oral, resp  rate 18, height 6' 4" (1 93 m), weight 107 kg (236 lb), SpO2 95 %  Intake/Output Summary (Last 24 hours) at 12/14/17 1523  Last data filed at 12/14/17 1230   Gross per 24 hour   Intake             2120 ml   Output                0 ml   Net             2120 ml         PHYSICAL EXAM:      General Appearance:   Alert, cooperative, no distress, appears stated age    HEENT:   Normocephalic, atraumatic, anicteric, no oropharyngeal thrush present      Neck:  Supple, symmetrical, trachea midline, no adenopathy;    thyroid: no enlargement/tenderness/nodules; no carotid  bruit or JVD    Lungs:   Clear to auscultation bilaterally; no rales, rhonchi or wheezing; respirations unlabored    Heart[de-identified]   S1 and S2 normal; regular rate and rhythm; no murmur, rub, or gallop     Abdomen:   Soft, epigastric tenderness, non-distended; normal bowel sounds; no masses, no organomegaly    Genitalia:   Deferred    Rectal:   Deferred    Extremities:  No cyanosis, clubbing or edema    Pulses:  2+ and symmetric all extremities    Skin:  Skin color, texture, turgor normal, no rashes or lesions    Lymph nodes:  No palpable cervical, axillary or inguinal lymphadenopathy        Lab Results:     Results from last 7 days  Lab Units 12/14/17  0736 12/13/17  2234   WBC Thousand/uL 12 76* 13 97*   HEMOGLOBIN g/dL 14 6 15 2   HEMATOCRIT % 43 3 44 2   PLATELETS Thousands/uL 269 283   NEUTROS PCT %  --  79*   LYMPHS PCT %  --  14   MONOS PCT %  --  6   EOS PCT %  --  1       Results from last 7 days  Lab Units 12/14/17  0736   SODIUM mmol/L 129*   POTASSIUM mmol/L 3 2*   CHLORIDE mmol/L 95*   CO2 mmol/L 24   BUN mg/dL 8   CREATININE mg/dL 0 77   CALCIUM mg/dL 8 1*   TOTAL PROTEIN g/dL 7 4   BILIRUBIN TOTAL mg/dL 0 80   ALK PHOS U/L 97   ALT U/L 28   AST U/L 18   GLUCOSE RANDOM mg/dL 123       Results from last 7 days  Lab Units 12/13/17  2234   INR  0 88       Results from last 7 days  Lab Units 12/14/17  0736   LIPASE u/L 361       Imaging Studies: I have personally reviewed pertinent imaging studies  Xr Chest 2 Views    Result Date: 12/13/2017  Impression: No active pulmonary disease  Workstation performed: [id number]     Ct Abdomen Pelvis With Contrast    Result Date: 12/14/2017  Impression: Fluid infiltration seen about the pancreatic head, along the duodenal sweep and to a lesser degree within the small bowel mesentery in the LEFT upper quadrant  Findings favoring early acute pancreatitis  Within the differential would include a small bowel enteritis  Findings are consistent with the preliminary report from [facility] which was provided shortly after completion of the exam              Workstation performed: [id number]           Patient was seen and examined by Dr Bam Oconnell  All dee medical decisions were made by Dr Bam Oconnell  Thank you for allowing us to participate in the care of this present patient  We will follow-up with you closely

## 2017-12-14 NOTE — CASE MANAGEMENT
Initial Clinical Review    Admission: Date/Time/Statement: 12/14/17 @ 0047     Orders Placed This Encounter   Procedures    Inpatient Admission (expected length of stay for this patient is greater than two midnights)     Standing Status:   Standing     Number of Occurrences:   1     Order Specific Question:   Admitting Physician     Answer:   Kevin Dent     Order Specific Question:   Level of Care     Answer:   Med Surg [16]     Order Specific Question:   Estimated length of stay     Answer:   More than 2 Midnights     Order Specific Question:   Certification     Answer:   I certify that inpatient services are medically necessary for this patient for a duration of greater than two midnights  See H&P and MD Progress Notes for additional information about the patient's course of treatment  ED: Date/Time/Mode of Arrival:   ED Arrival Information     Expected Arrival Acuity Means of Arrival Escorted By Service Admission Type    - 12/13/2017 21:02 Urgent Walk-In Family Member General Medicine Urgent    Arrival Complaint    ABDOMINAL PAIN          Chief Complaint:   Chief Complaint   Patient presents with    Abdominal Pain     pt c/o general abd pain, BP elivated, muscle spasm, nausea and vomiting all started this morning  History of Illness: 11 y o  male with past medical history significant hypertension hyperlipidemia presents the ED for evaluation of abdominal pain  Patient reports that pain started at approximately 8:00 a m  yesterday  Patient reports pain carotid aware  Patient reports nausea and vomiting  Denies any diarrhea  Patient states pain is located in the mid to lower abdomen and sharp in nature  Patient reports improvement of pain with pain medications  Patient reports he did have 1 bloody bowel movement early in the day  He reports no previous history of bloody bowel movements  Patient denies any recent sick contacts  Denies eating any raw eggs/fish/meat    No prior history of abdominal surgery, does have an umbilical hernia he was told  ED Vital Signs:   ED Triage Vitals [12/13/17 2110]   Temperature Pulse Respirations Blood Pressure SpO2   97 9 °F (36 6 °C) 94 18 (!) 186/109 96 %      Temp Source Heart Rate Source Patient Position - Orthostatic VS BP Location FiO2 (%)   Oral Monitor Lying Right arm --      Pain Score       8        Wt Readings from Last 1 Encounters:   12/14/17 107 kg (236 lb)       Vital Signs (abnormal): /109 - 178/90    Abnormal Labs/Diagnostic Test Results:   Na 124, K 3 3, cl 89  Lipase 425  UA 2+ protein  Small bilirubin   Large blood  Wbc 13 97    Ct abdomen - Fluid infiltration seen about the pancreatic head, along the duodenal sweep and to a lesser degree within the small bowel mesentery in the LEFT upper quadrant   Findings favoring early acute pancreatitis   Within the differential would include a small   bowel enteritis  Labs am 12/14- cortisol 16 2  Na 129, K 3 2  Cl 95  Calcium 8 1  Albumin 3 2     Wbc 12 76    ED Treatment: blood cultures  Medication Administration from 12/13/2017 2102 to 12/14/2017 0214       Date/Time Order Dose Route Action Action by Comments     12/13/2017 2236 ondansetron (ZOFRAN) injection 4 mg 4 mg Intravenous Given Neftaly Dean RN      12/13/2017 2238 HYDROmorphone (DILAUDID) 1 mg/mL injection 0 5 mg 0 5 mg Intravenous Given Neftaly Dean RN      12/14/2017 3465 sodium chloride 0 9 % bolus 1,000 mL 0 mL Intravenous Stopped Mel Adam RN      12/13/2017 2236 sodium chloride 0 9 % bolus 1,000 mL 1,000 mL Intravenous New Bag Neftaly Dean RN      12/14/2017 3279 sodium chloride 0 9 % infusion 125 mL/hr Intravenous New [address] Mel Adam RN      12/14/2017 0012 iohexol (OMNIPAQUE) 350 MG/ML injection (MULTI-DOSE) 100 mL 100 mL Intravenous Given Jimenez Pruitt Amo-Presley      12/14/2017 0044 HYDROmorphone (DILAUDID) 1 mg/mL injection 1 mg 1 mg Intravenous Given Miguel Angel Velez, [place]      12/14/2017 0205 ciprofloxacin (CIPRO) IVPB (premix) 400 mg 400 mg Intravenous New Bag Miguel Angel Velez RN      12/14/2017 0205 metroNIDAZOLE (FLAGYL) IVPB (premix) 500 mg 0 mg Intravenous Stopped Miguel Angel Velez RN      12/14/2017 0134 metroNIDAZOLE (FLAGYL) IVPB (premix) 500 mg 500 mg Intravenous New Bag Miguel Angel Velez RN      12/14/2017 0127 pantoprazole (PROTONIX) injection 40 mg 40 mg Intravenous Given Miguel Angel Velez RN           Past Medical/Surgical History:    Active Ambulatory Problems     Diagnosis Date Noted    Duodenitis 12/14/2017     Resolved Ambulatory Problems     Diagnosis Date Noted    No Resolved Ambulatory Problems     Past Medical History:   Diagnosis Date    Hypertension     Pancreatitis        Admitting Diagnosis: Enteritis [K52 9]  Hyponatremia [E87 1]  Pancreatitis [K85 90]  Abdominal pain [R10 9]  Duodenitis [K29 80]    Age/Sex: 56 y o  male    Assessment/Plan:   Acute pancreatitis   Assessment & Plan     · Mildly elevated lipase  · CT with some fat stranding of pancreatic head-- pancreatitis/duodenitis noted  · Clear liquid diet  · IVF  · Pain control        * Enteritis   Assessment & Plan     · Received Cipro + Flagyl in ED  · Patient also with BRBPR x1, which he states is new to him  · Repeat CBC in AM to monitor hgb  · Consider GI consultation       Hyponatremia   Assessment & Plan     ·  today, no previous levels; admits to vomiting today  · Has hx of excessive ETOH use, not ETOH in system today  · Check serum and urine studies  · IVF hydration  · Repeat BMP In AM        Hypertension   Assessment & Plan     · Continue home medications         Admission Orders:  12/14/2017  0048 INPATIENT   Scheduled Meds:   dicyclomine 20 mg Oral 4x Daily (AC & HS)   nicotine 1 patch Transdermal Daily   senna 1 tablet Oral Daily     Continuous Infusions:   sodium chloride 0 9 % with KCl 40 mEq/L 125 mL/hr Last Rate: 125 mL/hr (12/14/17 1011)     PRN Meds:   acetaminophen    calcium carbonate    HYDROmorphone  0 5 iv - used x 1      Ondansetron  4 mg iv - used x 1      oxyCODONE  IR - used x 2      traMADol    OTHER ORDERS:scds  clears      [address] [facility] in the [facility] by Philippe Enins for 2017  Network Utilization Review Department  Phone: [phone]; Fax [phone]  ATTENTION: The Network Utilization Review Department is now centralized for our 7 Facilities  Make a note that we have a new phone and fax numbers for our Department  Please call with any questions or concerns to [phone] and carefully follow the prompts so that you are directed to the right person  All voicemails are confidential  Fax any determinations, approvals, denials, and requests for initial or continue stay review clinical to [phone]  Due to HIGH CALL volume, it would be easier if you could please send faxed requests to expedite your requests and in part, help us provide discharge notifications faster

## 2017-12-14 NOTE — ASSESSMENT & PLAN NOTE
· Received Cipro + Flagyl in ED  · Patient also with BRBPR x1, which he states is new to him  · Repeat CBC in AM to monitor hgb  · Consider GI consultation

## 2017-12-14 NOTE — ED PROVIDER NOTES
History  Chief Complaint   Patient presents with    Abdominal Pain     pt c/o general abd pain, BP elivated, muscle spasm, nausea and vomiting all started this morning  Patient is a 56year old male with worsening constant upper abdominal pain today since this AM with nausea and vomiting  No diarrhea  (+) BRBPR  No fever  No abdominal surgery  No urinary sx  (+) etoh use and denies etoh every day  Has had prior pancreatitis in the past and this feels similar  States he did not drive here  Was last seen in this ED on 12/4/17 for foot sprain  [facility] website checked on this patient and no Rx found  (+) muscle cramps and spasms  BP has been elevated as well  History provided by:  Patient and spouse   used: No    Abdominal Pain   Associated symptoms: nausea and vomiting    Associated symptoms: no diarrhea and no fever        None       Past Medical History:   Diagnosis Date    Hypertension     Pancreatitis        History reviewed  No pertinent surgical history  History reviewed  No pertinent family history  I have reviewed and agree with the history as documented  Social History   Substance Use Topics    Smoking status: Current Every Day Smoker     Packs/day: 1 00     Types: Cigarettes    Smokeless tobacco: Never Used    Alcohol use Yes      Comment: "alot"        Review of Systems   Constitutional: Negative for fever  Gastrointestinal: Positive for abdominal pain, blood in stool, nausea and vomiting  Negative for diarrhea  Genitourinary: Negative for difficulty urinating  Musculoskeletal: Positive for myalgias  All other systems reviewed and are negative        Physical Exam  ED Triage Vitals [12/13/17 2110]   Temperature Pulse Respirations Blood Pressure SpO2   97 9 °F (36 6 °C) 94 18 (!) 186/109 96 %      Temp Source Heart Rate Source Patient Position - Orthostatic VS BP Location FiO2 (%)   Oral Monitor Lying Right arm --      Pain Score       8 Orthostatic Vital Signs  Vitals:    12/13/17 2110 12/13/17 2130 12/13/17 2252 12/14/17 0033   BP: (!) 186/109 163/95 169/95 (!) 172/103   Pulse: 94 94 87 87   Patient Position - Orthostatic VS: Lying  Sitting Lying       Physical Exam   Constitutional: He is oriented to person, place, and time  He appears well-developed and well-nourished  He appears distressed (moderate)  HENT:   Head: Normocephalic and atraumatic  Mucous membranes somewhat moist  Odor not unlike alcohol on breath  Eyes: No scleral icterus  Neck: Normal range of motion  Neck supple  No tracheal deviation present  Cardiovascular: Normal rate, regular rhythm and normal heart sounds  No murmur heard  Pulmonary/Chest: Effort normal and breath sounds normal  No stridor  No respiratory distress  Abdominal: Soft  Bowel sounds are normal  He exhibits no distension  There is tenderness (diffuse upper abdominal tenderness)  There is no rebound and no guarding  Genitourinary: Rectal exam shows guaiac negative stool (no bleeding noted)  Musculoskeletal: He exhibits no edema or deformity  Neurological: He is alert and oriented to person, place, and time  Skin: Skin is warm and dry  No rash noted  No pallor  Psychiatric: He has a normal mood and affect  Nursing note and vitals reviewed        ED Medications  Medications   sodium chloride 0 9 % infusion (not administered)   ciprofloxacin (CIPRO) IVPB (premix) 400 mg (not administered)   metroNIDAZOLE (FLAGYL) IVPB (premix) 500 mg (not administered)   pantoprazole (PROTONIX) injection 40 mg (not administered)   ondansetron (ZOFRAN) injection 4 mg (4 mg Intravenous Given 12/13/17 2236)   HYDROmorphone (DILAUDID) 1 mg/mL injection 0 5 mg (0 5 mg Intravenous Given 12/13/17 2238)   sodium chloride 0 9 % bolus 1,000 mL (1,000 mL Intravenous New Bag 12/13/17 2236)   iohexol (OMNIPAQUE) 350 MG/ML injection (MULTI-DOSE) 100 mL (100 mL Intravenous Given 12/14/17 0012)   HYDROmorphone (DILAUDID) 1 mg/mL injection 1 mg (1 mg Intravenous Given 12/14/17 0044)       Diagnostic Studies  Results Reviewed     Procedure Component Value Units Date/Time    Blood culture #1 [[id number]]     Lab Status:  No result Specimen:  Blood     Blood culture #2 [[id number]]     Lab Status:  No result Specimen:  Blood     Comprehensive metabolic panel [[id number]]  (Abnormal) Collected:  12/13/17 2234    Lab Status:  Final result Specimen:  Blood from Arm, Right Updated:  12/13/17 2350     Sodium 124 (L) mmol/L      Potassium 3 3 (L) mmol/L      Chloride 89 (L) mmol/L      CO2 23 mmol/L      Anion Gap 12 mmol/L      BUN 11 mg/dL      Creatinine 0 81 mg/dL      Glucose 128 mg/dL      Calcium 8 6 mg/dL      AST 21 U/L      ALT 33 U/L      Alkaline Phosphatase 103 U/L      Total Protein 8 2 g/dL      Albumin 3 7 g/dL      Total Bilirubin 1 00 mg/dL      eGFR 107 ml/min/1 73sq m     Narrative:         National Kidney Disease Education Program recommendations are as follows:  GFR calculation is accurate only with a steady state creatinine  Chronic Kidney disease less than 60 ml/min/1 73 sq  meters  Kidney failure less than 15 ml/min/1 73 sq  meters  Lactic acid, plasma [[id number]]  (Abnormal) Collected:  12/13/17 2234    Lab Status:  Final result Specimen:  Blood from Arm, Right Updated:  12/13/17 2335     LACTIC ACID <0 3 (L) mmol/L     Narrative:         Result may be elevated if tourniquet was used during collection      Urine Microscopic [[id number]]  (Abnormal) Collected:  12/13/17 2309    Lab Status:  Final result Specimen:  Urine from Urine, Clean Catch Updated:  12/13/17 2329     RBC, UA 10-20 (A) /hpf      WBC, UA 2-4 (A) /hpf      Epithelial Cells Occasional /hpf      Bacteria, UA Occasional /hpf      MUCOUS THREADS Moderate    Lipase [[id number]]  (Abnormal) Collected:  12/13/17 2234    Lab Status:  Final result Specimen:  Blood from Arm, Right Updated:  12/13/17 2318     Lipase 425 (H) u/L     UA w Reflex to Microscopic w Reflex to Culture [[id number]]  (Abnormal) Collected:  12/13/17 2309    Lab Status:  Final result Specimen:  Urine from Urine, Clean Catch Updated:  12/13/17 2314     Color, UA Yellow     Clarity, UA Clear     Specific Gravity, UA >=1 030     pH, UA 5 5     Leukocytes, UA Negative     Nitrite, UA Negative     Protein,  (2+) (A) mg/dl      Glucose, UA Negative mg/dl      Ketones, UA Negative mg/dl      Urobilinogen, UA 0 2 E U /dl      Bilirubin, UA Small (A)     Blood, UA Large (A)    CK Total with Reflex CKMB [[id number]]  (Normal) Collected:  12/13/17 2234    Lab Status:  Final result Specimen:  Blood from Arm, Right Updated:  12/13/17 2308     Total CK 95 U/L     Ethanol [[id number]]  (Normal) Collected:  12/13/17 2234    Lab Status:  Final result Specimen:  Blood from Arm, Right Updated:  12/13/17 2301     Ethanol Lvl <3 mg/dL     Protime-INR [[id number]]  (Normal) Collected:  12/13/17 2234    Lab Status:  Final result Specimen:  Blood from Arm, Right Updated:  12/13/17 2256     Protime 12 2 seconds      INR 0 88    APTT [[id number]]  (Normal) Collected:  12/13/17 2234    Lab Status:  Final result Specimen:  Blood from Arm, Right Updated:  12/13/17 2256     PTT 33 seconds     Narrative:          Therapeutic Heparin Range = 60-90 seconds    CBC and differential [[id number]]  (Abnormal) Collected:  12/13/17 2234    Lab Status:  Final result Specimen:  Blood from Arm, Right Updated:  12/13/17 2246     WBC 13 97 (H) Thousand/uL      RBC 4 84 Million/uL      Hemoglobin 15 2 g/dL      Hematocrit 44 2 %      MCV 91 fL      MCH 31 4 pg      MCHC 34 4 g/dL      RDW 13 2 %      MPV 10 1 fL      Platelets 428 Thousands/uL      Neutrophils Relative 79 (H) %      Lymphocytes Relative 14 %      Monocytes Relative 6 %      Eosinophils Relative 1 %      Basophils Relative 0 %      Neutrophils Absolute 11 11 (H) Thousands/µL      Lymphocytes Absolute 1 95 Thousands/µL      Monocytes Absolute 0 78 Thousand/µL      Eosinophils Absolute 0 09 Thousand/µL      Basophils Absolute 0 04 Thousands/µL                  CT abdomen pelvis with contrast   ED Interpretation by Kishan Munoz MD (12/14 0041)   COMPARISON:   No relevant prior studies available  FINDINGS:   Lower thorax: No acute findings  ABDOMEN:   Liver: Low attenuation of the liver parenchyma may reflect fatty infiltration  Gallbladder and bile ducts: Within normal limits  No calcified stones  No ductal dilation  Pancreas: There is fat stranding between the pancreatic head and duodenum with duodenal   thickening  Correlate for pancreatitis and duodenitis  No peripancreatic collection  No ductal dilation  Spleen: Within normal limits  No splenomegaly  Adrenals: Within normal limits  No mass  Kidneys and ureters: Subcentimeter left renal cyst  No obstructing stones  No hydronephrosis  Stomach and bowel: Also mild fat stranding between small bowel loops in the left upper quadrant   which can be seen with an enteritis  Prominent left colon submucosal fat  No obstruction  Appendix: Normal    PELVIS:   Bladder: Within normal limits  No mass  Reproductive: Unremarkable as visualized  ABDOMEN and PELVIS:   Intraperitoneal space: Within normal limits  No free air  No significant fluid collection  Bones/joints: No acute fracture  No dislocation  Soft tissues: Within normal limits  Vasculature: Moderate aortoiliac atherosclerosis  No aortic aneurysm  Lymph nodes: Within normal limits  No enlarged lymph nodes  IMPRESSION:   Focal fat stranding between the pancreatic head and duodenum with amorphous area of low   attenuation  Correlate for pancreatitis and duodenitis  Suggest followup to exclude developing   collection  Mild fat stranding between small bowel loops in the left upper quadrant which can be seen with an   enteritis  Thank you for allowing us to participate in the care of your patient     Dictated and Authenticated by: Cinthia Mera MD   12/14/2017 12:38 AM [place] Time (Dolores Gregg [id number])      XR chest 2 views   ED Interpretation by Erasmo Duncan MD (12/13 2236)   FINDINGS:           Cardiomediastinal silhouette appears unremarkable  The lungs are clear   No pneumothorax or pleural effusion  Visualized osseous structures appear within normal limits for the patient's age  Impression:        No active pulmonary disease  Workstation performed: [id number]         Final Result by Augustina Moreno MD (12/13 2231)      No active pulmonary disease  Workstation performed: [id number]                    Procedures  Procedures       Phone Contacts  ED Phone Contact    ED Course  ED Course as of Dec 14 0047   Thu Dec 14, 2017   0037 Patient still with pain so more IV dilaudid ordered  Initial Sepsis Screening     Row Name 12/14/17 0036                Is the patient's history suggestive of a new or worsening infection? No  -AO        Suspected source of infection  --        Are two or more of the following signs & symptoms of infection both present and new to the patient?  --        Indicate SIRS criteria  --        If the answer is yes to both questions, suspicion of sepsis is present  --        If severe sepsis is present AND tissue hypoperfusion perists in the hour after fluid resuscitation or lactate > 4, the patient meets criteria for SEPTIC SHOCK  --        Are any of the following organ dysfunction criteria present within 6 hours of suspected infection and SIRS criteria that are NOT considered to be chronic conditions?   --        Organ dysfunction  --        Date of presentation of severe sepsis  --        Time of presentation of severe sepsis  --        Tissue hypoperfusion persists in the hour after crystalloid fluid administration, evidenced, by either:  --        Was hypotension present within one hour of the conclusion of crystalloid fluid administration?  --        Date of presentation of septic shock  -- Time of presentation of septic shock  --          User Key  (r) = Recorded By, (t) = Taken By, (c) = Cosigned By    [address] Name Provider Jimmy Larry MD Physician                  MDM  Number of Diagnoses or Management Options  Diagnosis management comments: DDx including but not limited to: appendicitis, gastroenteritis, gastritis, PUD, GERD, gastroparesis, hepatitis, pancreatitis, colitis, enteritis, diverticulitis, food poisoning, mesenteric adenitis, mesenteric ischemia, IBD, IBS, ileus, bowel obstruction, volvulus, internal hernia, cholecystitis, biliary colic, choledocholithiasis, AAA, testicular torsion, renal colic, pyelonephritis, UTI, hemorrhoid, AVM, diverticulosis; doubt cardiac etiology or upper GI bleed  Amount and/or Complexity of Data Reviewed  Clinical lab tests: ordered and reviewed  Tests in the radiology section of CPT®: ordered and reviewed  Decide to obtain previous medical records or to obtain history from someone other than the patient: yes  Obtain history from someone other than the patient: yes  Review and summarize past medical records: yes  Independent visualization of images, tracings, or specimens: yes      CritCare Time    Disposition  Final diagnoses:   Pancreatitis   Duodenitis   Enteritis   Hyponatremia     Time reflects when diagnosis was documented in both MDM as applicable and the Disposition within this note     Time User Action Codes Description Comment    12/14/2017 12:46 AM Oletha Skains Add [K85 90] Pancreatitis     12/14/2017 12:46 AM Oletha Skains Add [K29 80] Duodenitis     12/14/2017 12:47 AM Oletha Skains Add [K52 9] Enteritis     12/14/2017 12:47 AM Oletha Skains Add [E87 1] Hyponatremia       ED Disposition     ED Disposition Condition Comment    Admit  Case was discussed with YVONNE Blanc and the patient's admission status was agreed to be Admission Status: inpatient status to the service of Dr Mat Osgood   Follow-up Information    None       Patient's Medications    No medications on file     No discharge procedures on file      ED Provider  Electronically Signed by           Eliel Hart MD  12/14/17 8363

## 2017-12-14 NOTE — ASSESSMENT & PLAN NOTE
·  today, no previous levels; patient denies diarrhea/vomiting to suggest source of loss   · Has hx of excessive ETOH use, not ETOH in system today  · Check serum and urine studies  · IVF hydration  · Repeat BMP In AM

## 2017-12-14 NOTE — ASSESSMENT & PLAN NOTE
· Mildly elevated lipase  · CT with some fat stranding of pancreatic head-- pancreatitis/duodenitis noted  · Clear liquid diet  · IVF  · Pain control

## 2017-12-15 VITALS
BODY MASS INDEX: 28.74 KG/M2 | OXYGEN SATURATION: 97 % | HEART RATE: 78 BPM | SYSTOLIC BLOOD PRESSURE: 150 MMHG | HEIGHT: 76 IN | DIASTOLIC BLOOD PRESSURE: 87 MMHG | TEMPERATURE: 98 F | RESPIRATION RATE: 18 BRPM | WEIGHT: 236 LBS

## 2017-12-15 PROBLEM — K62.5 RECTAL BLEEDING: Status: ACTIVE | Noted: 2017-12-15

## 2017-12-15 LAB
ALBUMIN SERPL BCP-MCNC: 3 G/DL (ref 3.5–5)
ALP SERPL-CCNC: 85 U/L (ref 46–116)
ALT SERPL W P-5'-P-CCNC: 26 U/L (ref 12–78)
ANION GAP SERPL CALCULATED.3IONS-SCNC: 7 MMOL/L (ref 4–13)
AST SERPL W P-5'-P-CCNC: 17 U/L (ref 5–45)
BILIRUB SERPL-MCNC: 0.7 MG/DL (ref 0.2–1)
BUN SERPL-MCNC: 6 MG/DL (ref 5–25)
CALCIUM SERPL-MCNC: 8.4 MG/DL (ref 8.3–10.1)
CHLORIDE SERPL-SCNC: 102 MMOL/L (ref 100–108)
CO2 SERPL-SCNC: 24 MMOL/L (ref 21–32)
CREAT SERPL-MCNC: 0.75 MG/DL (ref 0.6–1.3)
ERYTHROCYTE [DISTWIDTH] IN BLOOD BY AUTOMATED COUNT: 13.3 % (ref 11.6–15.1)
GFR SERPL CREATININE-BSD FRML MDRD: 110 ML/MIN/1.73SQ M
GLUCOSE SERPL-MCNC: 99 MG/DL (ref 65–140)
HCT VFR BLD AUTO: 40.7 % (ref 36.5–49.3)
HGB BLD-MCNC: 13.2 G/DL (ref 12–17)
MCH RBC QN AUTO: 31.1 PG (ref 26.8–34.3)
MCHC RBC AUTO-ENTMCNC: 32.4 G/DL (ref 31.4–37.4)
MCV RBC AUTO: 96 FL (ref 82–98)
PLATELET # BLD AUTO: 239 THOUSANDS/UL (ref 149–390)
PMV BLD AUTO: 10.3 FL (ref 8.9–12.7)
POTASSIUM SERPL-SCNC: 4 MMOL/L (ref 3.5–5.3)
PROT SERPL-MCNC: 7 G/DL (ref 6.4–8.2)
RBC # BLD AUTO: 4.25 MILLION/UL (ref 3.88–5.62)
SODIUM SERPL-SCNC: 133 MMOL/L (ref 136–145)
WBC # BLD AUTO: 10.72 THOUSAND/UL (ref 4.31–10.16)

## 2017-12-15 PROCEDURE — 80053 COMPREHEN METABOLIC PANEL: CPT | Performed by: INTERNAL MEDICINE

## 2017-12-15 PROCEDURE — 85027 COMPLETE CBC AUTOMATED: CPT | Performed by: INTERNAL MEDICINE

## 2017-12-15 RX ORDER — SODIUM CHLORIDE 9 MG/ML
100 INJECTION, SOLUTION INTRAVENOUS CONTINUOUS
Status: DISCONTINUED | OUTPATIENT
Start: 2017-12-15 | End: 2017-12-15 | Stop reason: HOSPADM

## 2017-12-15 RX ORDER — LISINOPRIL 20 MG/1
20 TABLET ORAL DAILY
Qty: 30 TABLET | Refills: 0 | Status: SHIPPED | OUTPATIENT
Start: 2017-12-16

## 2017-12-15 RX ORDER — NICOTINE 21 MG/24HR
1 PATCH, TRANSDERMAL 24 HOURS TRANSDERMAL DAILY
Qty: 28 PATCH | Refills: 0 | Status: SHIPPED | OUTPATIENT
Start: 2017-12-16 | End: 2018-01-19

## 2017-12-15 RX ADMIN — DICYCLOMINE HYDROCHLORIDE 20 MG: 10 CAPSULE ORAL at 11:36

## 2017-12-15 RX ADMIN — DICYCLOMINE HYDROCHLORIDE 20 MG: 10 CAPSULE ORAL at 06:02

## 2017-12-15 RX ADMIN — POTASSIUM CHLORIDE: 2 INJECTION, SOLUTION, CONCENTRATE INTRAVENOUS at 03:12

## 2017-12-15 RX ADMIN — LISINOPRIL 20 MG: 20 TABLET ORAL at 09:08

## 2017-12-15 RX ADMIN — SODIUM CHLORIDE 100 ML/HR: 0.9 INJECTION, SOLUTION INTRAVENOUS at 11:36

## 2017-12-15 RX ADMIN — OXYCODONE HYDROCHLORIDE 5 MG: 5 TABLET ORAL at 02:48

## 2017-12-15 RX ADMIN — METOPROLOL TARTRATE 25 MG: 25 TABLET ORAL at 09:09

## 2017-12-15 RX ADMIN — SENNOSIDES 8.6 MG: 8.6 TABLET, FILM COATED ORAL at 09:08

## 2017-12-15 NOTE — DISCHARGE SUMMARY
Discharge- Rex Marvin 1970, 56 y o  male MRN: [id number]    Unit/Bed#: -01 Encounter: [id number]    Primary Care Provider: Jimmy Hernandez MD   Date and time admitted to hospital: 12/13/2017  9:08 PM  * Acute pancreatitis   Assessment & Plan    · Likely due to alcohol- recommended abstinence  · Appreciate involvement from Gastroenterology   · RUQ ultrasound unremarkable  · Mildly elevated lipase resolved- he received IVF and pain medications  · CT with some fat stranding of pancreatic head-- pancreatitis/duodenitis noted  · Clear liquid diet advanced today to low fat diet and tolerated          Rectal bleeding   Assessment & Plan    · Appreciate involvement from Gastroenterology, this was felt to be hemorrhoidal  · Did not feel patient required antibiotics were additional inpatient workup        Hyponatremia   Assessment & Plan    ·  initially, significantly improved to 133 on day of discharge after IV fluids  · Would recommend maintaining him off hydrochlorothiazide, and have PCP recheck in 1 week        Hypertension   Assessment & Plan    · Continue to follow up with PCP  · Continue lisinopril alone          Consultations During Hospital Stay:  · Gastroenterology    Procedures Performed:     CT scan of the abdomen and pelvis with contrast= Fluid infiltration seen about the pancreatic head, along the duodenal sweep and to a lesser degree within the small bowel mesentery in the LEFT upper quadrant  Findings favoring early acute pancreatitis  Within the differential would include a small bowel enteritis  Right upper quadrant ultrasound= mild steatosis and hepatomegaly  No evidence of cholelithiasis, cholecystitis, or biliary obstruction  Significant Findings / Test Results:     · See above    Incidental Findings:   · proteinuria    Test Results Pending at Discharge (will require follow up):    · Finalized blood cultures, thus far negative at 24 hours     Outpatient Tests Requested:  · BMP 1 week  · Repeat UA in future    Complications:  Alcohol use, without withdrawal    Reason for Admission:  Pancreatitis    Hospital Course:     Alva Larson is a 56 y o  male patient who originally presented to the hospital on 12/13/2017 due to abdominal pain  There was also evidence of nausea and vomiting but no diarrhea  There was red blood noted in the stool  CT scan of the abdomen pelvis showed concern for pancreatitis and also possible enteritis  He was seen in consultation by Gastroenterology  They agreed with maintaining him on aggressive IV fluids and clear liquids  His initially elevated lipase resolved quickly and his diet was advanced and was tolerated without any complaints of pain  Right upper quadrant ultrasound did not show any evidence of gallstones  We suspected alcohol was the etiology of his pancreatitis  He denies any withdrawal symptoms and understands recommendation for abstinence  He has not had any significant drop in his hemoglobin and GI did not feel any other additional testing was needed at this time  Of note his sodium was quite low on admission at 124  The hydrochlorothiazide he was taking pre-hospital was held and he was given IV fluids for his pancreatitis  His sodium on day of discharge was 133  Would recommend keeping him off the hydrochlorothiazide at this time and having his family doctor follow him up in 1 week  Please see above list of diagnoses and related plan for additional information  Condition at Discharge: stable     Discharge Day Visit / Exam:     Subjective: When I evaluated the patient in the morning he stated he was feeling well  He denied any abdominal pain, nausea, vomiting, diarrhea, or bleeding  He was asking for increase diet  I subsequently increased his diet to low-fat and check back later at which point I was told he did tolerate his diet and has no pain and is feeling well and anxious to go home    Vitals: Blood Pressure: 150/87 (12/15/17 0700)  Pulse: 78 (12/15/17 0700)  Temperature: 98 °F (36 7 °C) (12/15/17 0700)  Temp Source: Oral (12/15/17 0700)  Respirations: 18 (12/15/17 0700)  Height: 6' 4" (193 cm) (12/14/17 0225)  Weight - Scale: 107 kg (236 lb) (12/14/17 0225)  SpO2: 97 % (12/15/17 0700)  Exam:   Physical Exam   Constitutional: He appears well-developed and well-nourished  No distress  HENT:   Head: Normocephalic and atraumatic  Eyes: Conjunctivae are normal  Right eye exhibits no discharge  Left eye exhibits no discharge  No scleral icterus  Cardiovascular: Normal rate, regular rhythm and normal heart sounds  No murmur heard  Pulmonary/Chest: Effort normal and breath sounds normal  No respiratory distress  He has no wheezes  He has no rales  Abdominal: Soft  Bowel sounds are normal  He exhibits no distension  There is no tenderness  There is no rebound and no guarding  Musculoskeletal: He exhibits no edema  Neurological: He is alert  Awake alert and interactive, pleasant and cooperative, not combative  No tremor or hallucinations noted  Skin: Skin is dry  No rash noted  He is not diaphoretic  No erythema  No pallor  Psychiatric: He has a normal mood and affect  His behavior is normal  Judgment and thought content normal    Vitals reviewed  Discussion with Family:   Discharge instructions/Information to patient and family:   See after visit summary for information provided to patient and family  Provisions for Follow-Up Care:  See after visit summary for information related to follow-up care and any pertinent home health orders  Disposition:     Home    For Discharges to [facility] SNF:   · Not Applicable to this Patient - Not Applicable to this Patient    Planned Readmission: none     Discharge Statement:  I spent 35 minutes discharging the patient  This time was spent on the day of discharge  I had direct contact with the patient on the day of discharge   Greater than 50% of the total time was spent examining patient, answering all patient questions, arranging and discussing plan of care with patient as well as directly providing post-discharge instructions  Additional time then spent on discharge activities  Discharge Medications:  See after visit summary for reconciled discharge medications provided to patient and family        ** Please Note: This note has been constructed using a voice recognition system **

## 2017-12-15 NOTE — PLAN OF CARE
DISCHARGE PLANNING     Discharge to home or other facility with appropriate resources Adequate for Discharge        INFECTION - ADULT     Absence or prevention of progression during hospitalization Adequate for Discharge        Knowledge Deficit     Patient/family/caregiver demonstrates understanding of disease process, treatment plan, medications, and discharge instructions Adequate for Discharge        PAIN - ADULT     Verbalizes/displays adequate comfort level or baseline comfort level Adequate for Discharge        Potential for Falls     Patient will remain free of falls Adequate for Discharge        SAFETY ADULT     Maintain or return to baseline ADL function Adequate for Discharge     Maintain or return mobility status to optimal level Adequate for Discharge          INFECTION - ADULT     Absence of fever/infection during neutropenic period Completed

## 2017-12-15 NOTE — ASSESSMENT & PLAN NOTE
·  initially, significantly improved to 133 on day of discharge after IV fluids  · Would recommend maintaining him off hydrochlorothiazide, and have PCP recheck in 1 week

## 2017-12-15 NOTE — ASSESSMENT & PLAN NOTE
· Appreciate involvement from Gastroenterology, this was felt to be hemorrhoidal  · Did not feel patient required antibiotics were additional inpatient workup

## 2017-12-15 NOTE — ASSESSMENT & PLAN NOTE
· Likely due to alcohol- recommended abstinence  · Appreciate involvement from Gastroenterology   · RUQ ultrasound unremarkable  · Mildly elevated lipase resolved- he received IVF and pain medications  · CT with some fat stranding of pancreatic head-- pancreatitis/duodenitis noted  · Clear liquid diet advanced today to low fat diet and tolerated

## 2017-12-15 NOTE — PROGRESS NOTES
Progress Note - Gelacio Paris 56 y o  male MRN: [id number]    Unit/Bed#: -01 Encounter: [id number]        Subjective:   Patient reports feeling better, no abdominal pain at the moment, has not had bowel movement last night or this morning  No vomiting  Objective:     Vitals: Blood pressure 150/87, pulse 78, temperature 98 °F (36 7 °C), temperature source Oral, resp  rate 18, height 6' 4" (1 93 m), weight 107 kg (236 lb), SpO2 97 %  ,Body mass index is 28 73 kg/m²  Intake/Output Summary (Last 24 hours) at 12/15/17 1522  Last data filed at 12/15/17 1200   Gross per 24 hour   Intake             1600 ml   Output                0 ml   Net             1600 ml       Physical Exam:   General appearance: alert, appears stated age and cooperative  Lungs: clear to auscultation bilaterally, no labored breathing/accessory muscle use  Heart: regular rate and rhythm, S1, S2 normal, no murmur, click, rub or gallop  Abdomen: soft, non-tender; bowel sounds normal; no masses,  no organomegaly  Extremities: no edema    Invasive Devices          No matching active lines, drains, or airways          Lab, Imaging and other studies: I have personally reviewed pertinent reports      Admission on 12/13/2017   Component Date Value    WBC 12/13/2017 13 97*    RBC 12/13/2017 4 84     Hemoglobin 12/13/2017 15 2     Hematocrit 12/13/2017 44 2     MCV 12/13/2017 91     MCH 12/13/2017 31 4     MCHC 12/13/2017 34 4     RDW 12/13/2017 13 2     MPV 12/13/2017 10 1     Platelets 38/81/8285 283     Neutrophils Relative 12/13/2017 79*    Lymphocytes Relative 12/13/2017 14     Monocytes Relative 12/13/2017 6     Eosinophils Relative 12/13/2017 1     Basophils Relative 12/13/2017 0     Neutrophils Absolute 12/13/2017 11 11*    Lymphocytes Absolute 12/13/2017 1 95     Monocytes Absolute 12/13/2017 0 78     Eosinophils Absolute 12/13/2017 0 09     Basophils Absolute 12/13/2017 0 04     Protime 12/13/2017 12 2     INR 12/13/2017 0 88     PTT 12/13/2017 33     Sodium 12/13/2017 124*    Potassium 12/13/2017 3 3*    Chloride 12/13/2017 89*    CO2 12/13/2017 23     Anion Gap 12/13/2017 12     BUN 12/13/2017 11     Creatinine 12/13/2017 0 81     Glucose 12/13/2017 128     Calcium 12/13/2017 8 6     AST 12/13/2017 21     ALT 12/13/2017 33     Alkaline Phosphatase 12/13/2017 103     Total Protein 12/13/2017 8 2     Albumin 12/13/2017 3 7     Total Bilirubin 12/13/2017 1 00     eGFR 12/13/2017 107     Lipase 12/13/2017 425*    LACTIC ACID 12/13/2017 <0 3*    Ethanol Lvl 12/13/2017 <3     Color, UA 12/13/2017 Yellow     Clarity, UA 12/13/2017 Clear     Specific Gravity, UA 12/13/2017 >=1 030     pH, UA 12/13/2017 5 5     Leukocytes, UA 12/13/2017 Negative     Nitrite, UA 12/13/2017 Negative     Protein, UA 12/13/2017 100 (2+)*    Glucose, UA 12/13/2017 Negative     Ketones, UA 12/13/2017 Negative     Urobilinogen, UA 12/13/2017 0 2     Bilirubin, UA 12/13/2017 Small*    Blood, UA 12/13/2017 Large*    Total CK 12/13/2017 95     RBC, UA 12/13/2017 10-20*    WBC, UA 12/13/2017 2-4*    Epithelial Cells 12/13/2017 Occasional     Bacteria, UA 12/13/2017 Occasional     MUCOUS THREADS 12/13/2017 Moderate     Blood Culture 12/15/2017 No Growth at 24 hrs   Blood Culture 12/15/2017 No Growth at 24 hrs       Sodium, Ur 12/14/2017 45     Osmolality Serum 12/14/2017 290     Cortisol - AM 12/14/2017 26 2*    Sodium 12/14/2017 129*    Potassium 12/14/2017 3 2*    Chloride 12/14/2017 95*    CO2 12/14/2017 24     Anion Gap 12/14/2017 10     BUN 12/14/2017 8     Creatinine 12/14/2017 0 77     Glucose 12/14/2017 123     Calcium 12/14/2017 8 1*    AST 12/14/2017 18     ALT 12/14/2017 28     Alkaline Phosphatase 12/14/2017 97     Total Protein 12/14/2017 7 4     Albumin 12/14/2017 3 2*    Total Bilirubin 12/14/2017 0 80     eGFR 12/14/2017 109     WBC 12/14/2017 12 76*    RBC 12/14/2017 4 66     Hemoglobin 12/14/2017 14 6     Hematocrit 12/14/2017 43 3     MCV 12/14/2017 93     MCH 12/14/2017 31 3     MCHC 12/14/2017 33 7     RDW 12/14/2017 13 3     Platelets 95/92/1278 269     MPV 12/14/2017 10 3     Lipase 12/14/2017 361     Ventricular Rate 12/14/2017 85     Atrial Rate 12/14/2017 89     AZ Interval 12/14/2017 156     QRSD Interval 12/14/2017 96     QT Interval 12/14/2017 518     QTC Interval 12/14/2017 617     P Axis 12/14/2017 55     QRS Axis 12/14/2017 38     T Wave Axis 12/14/2017 44     WBC 12/15/2017 10 72*    RBC 12/15/2017 4 25     Hemoglobin 12/15/2017 13 2     Hematocrit 12/15/2017 40 7     MCV 12/15/2017 96     MCH 12/15/2017 31 1     MCHC 12/15/2017 32 4     RDW 12/15/2017 13 3     Platelets 58/84/4438 239     MPV 12/15/2017 10 3     Sodium 12/15/2017 133*    Potassium 12/15/2017 4 0     Chloride 12/15/2017 102     CO2 12/15/2017 24     Anion Gap 12/15/2017 7     BUN 12/15/2017 6     Creatinine 12/15/2017 0 75     Glucose 12/15/2017 99     Calcium 12/15/2017 8 4     AST 12/15/2017 17     ALT 12/15/2017 26     Alkaline Phosphatase 12/15/2017 85     Total Protein 12/15/2017 7 0     Albumin 12/15/2017 3 0*    Total Bilirubin 12/15/2017 0 70     eGFR 12/15/2017 110        Results for Bill Ryder (MRN [id number]) as of 12/15/2017 15:24   Ref   Range 12/14/2017 07:36 12/14/2017 07:43 12/15/2017 05:40   eGFR Latest Units: ml/min/1 73sq m 109  110   Sodium Latest Ref Range: 136 - 145 mmol/L 129 (L)  133 (L)   Potassium Latest Ref Range: 3 5 - 5 3 mmol/L 3 2 (L)  4 0   Chloride Latest Ref Range: 100 - 108 mmol/L 95 (L)  102   CO2 Latest Ref Range: 21 - 32 mmol/L 24  24   Anion Gap Latest Ref Range: 4 - 13 mmol/L 10  7   BUN Latest Ref Range: 5 - 25 mg/dL 8  6   Creatinine Latest Ref Range: 0 60 - 1 30 mg/dL 0 77  0 75   Glucose Latest Ref Range: 65 - 140 mg/dL 123  99   Calcium Latest Ref Range: 8 3 - 10 1 mg/dL 8 1 (L)  8 4   AST Latest Ref Range: 5 - 45 U/L 18  17   ALT Latest Ref Range: 12 - 78 U/L 28  26   Alkaline Phosphatase Latest Ref Range: 46 - 116 U/L 97  85   Total Protein Latest Ref Range: 6 4 - 8 2 g/dL 7 4  7 0   Albumin Latest Ref Range: 3 5 - 5 0 g/dL 3 2 (L)  3 0 (L)   Total Bilirubin Latest Ref Range: 0 20 - 1 00 mg/dL 0 80  0 70   Lipase Latest Ref Range: 73 - 393 u/L 361     OSMOLALITY, SERUM Latest Ref Range: 282 - 298 mmol/KG  290    WBC Latest Ref Range: 4 31 - 10 16 Thousand/uL 12 76 (H)  10 72 (H)   RBC Latest Ref Range: 3 88 - 5 62 Million/uL 4 66  4 25   Hemoglobin Latest Ref Range: 12 0 - 17 0 g/dL 14 6  13 2   Hematocrit Latest Ref Range: 36 5 - 49 3 % 43 3  40 7   MCV Latest Ref Range: 82 - 98 fL 93  96   MCH Latest Ref Range: 26 8 - 34 3 pg 31 3  31 1   MCHC Latest Ref Range: 31 4 - 37 4 g/dL 33 7  32 4   RDW Latest Ref Range: 11 6 - 15 1 % 13 3  13 3   Platelets Latest Ref Range: 149 - 390 Thousands/uL 269  239   MPV Latest Ref Range: 8 9 - 12 7 fL 10 3  10 3   Cortisol - AM Latest Ref Range: 4 2 - 22 4 ug/dL  26 2 (H)          Assessment/Plan:    1  Acute pancreatitis most likely secondary to alcohol, appears to be improving clinically, lipase has been normal, CT scan showed some mild inflammatory changes  Ultrasound showed no evidence of cholelithiasis or choledocholithiasis  - alcohol cessation  - check lipid profile  - may advance to low-fat diet      2  Rectal bleeding, no further episodes since initial complaint, suspect to be hemorrhoidal but rule out more proximal lesions    Hemoglobin has been stable    - outpatient follow-up for colonoscopy  - monitor hemoglobin

## 2017-12-18 NOTE — CASE MANAGEMENT
Notification of Discharge  This is a Notification of Discharge from our facility [address]  Please be advised that this patient has been discharge from our facility  Below you will find the admission and discharge date and time including the patients disposition  PRESENTATION DATE: 12/13/2017  9:08 PM  IP ADMISSION DATE: 12/14/17 0047  DISCHARGE DATE: 12/15/2017  3:37 PM  DISPOSITION: [address] [place] [address] in the [facility] by Philippe Ennis for 2017  Network Utilization Review Department  Phone: [phone]; Fax [phone]  ATTENTION: The Network Utilization Review Department is now centralized for our 7 Facilities  Make a note that we have a new phone and fax numbers for our Department  Please call with any questions or concerns to [phone] and carefully follow the prompts so that you are directed to the right person  All voicemails are confidential  Fax any determinations, approvals, denials, and requests for initial or continue stay review clinical to [phone]  Due to HIGH CALL volume, it would be easier if you could please send faxed requests to expedite your requests and in part, help us provide discharge notifications faster

## 2017-12-19 LAB
BACTERIA BLD CULT: NORMAL
BACTERIA BLD CULT: NORMAL

## 2018-01-19 ENCOUNTER — APPOINTMENT (EMERGENCY)
Dept: CT IMAGING | Facility: HOSPITAL | Age: 48
DRG: 440 | End: 2018-01-19
Payer: COMMERCIAL

## 2018-01-19 ENCOUNTER — HOSPITAL ENCOUNTER (INPATIENT)
Facility: HOSPITAL | Age: 48
LOS: 2 days | Discharge: HOME | DRG: 440 | End: 2018-01-21
Attending: EMERGENCY MEDICINE | Admitting: INTERNAL MEDICINE
Payer: COMMERCIAL

## 2018-01-19 ENCOUNTER — APPOINTMENT (EMERGENCY)
Dept: RADIOLOGY | Facility: HOSPITAL | Age: 48
DRG: 440 | End: 2018-01-19
Payer: COMMERCIAL

## 2018-01-19 DIAGNOSIS — K29.80 ACUTE DUODENITIS: ICD-10-CM

## 2018-01-19 DIAGNOSIS — K85.90 PANCREATITIS: Primary | ICD-10-CM

## 2018-01-19 PROBLEM — K21.9 GERD (GASTROESOPHAGEAL REFLUX DISEASE): Status: ACTIVE | Noted: 2018-01-19

## 2018-01-19 LAB
ALBUMIN SERPL BCP-MCNC: 3.6 G/DL (ref 3.5–5)
ALP SERPL-CCNC: 90 U/L (ref 46–116)
ALT SERPL W P-5'-P-CCNC: 31 U/L (ref 12–78)
ANION GAP SERPL CALCULATED.3IONS-SCNC: 11 MMOL/L (ref 4–13)
AST SERPL W P-5'-P-CCNC: 15 U/L (ref 5–45)
ATRIAL RATE: 87 BPM
ATRIAL RATE: 88 BPM
ATRIAL RATE: 94 BPM
BASOPHILS # BLD AUTO: 0.05 THOUSANDS/ΜL (ref 0–0.1)
BASOPHILS NFR BLD AUTO: 0 % (ref 0–1)
BILIRUB SERPL-MCNC: 0.3 MG/DL (ref 0.2–1)
BUN SERPL-MCNC: 12 MG/DL (ref 5–25)
CALCIUM SERPL-MCNC: 8.4 MG/DL (ref 8.3–10.1)
CHLORIDE SERPL-SCNC: 97 MMOL/L (ref 100–108)
CO2 SERPL-SCNC: 26 MMOL/L (ref 21–32)
CREAT SERPL-MCNC: 0.8 MG/DL (ref 0.6–1.3)
EOSINOPHIL # BLD AUTO: 0.06 THOUSAND/ΜL (ref 0–0.61)
EOSINOPHIL NFR BLD AUTO: 1 % (ref 0–6)
ERYTHROCYTE [DISTWIDTH] IN BLOOD BY AUTOMATED COUNT: 13.5 % (ref 11.6–15.1)
GFR SERPL CREATININE-BSD FRML MDRD: 107 ML/MIN/1.73SQ M
GLUCOSE SERPL-MCNC: 132 MG/DL (ref 65–140)
HCT VFR BLD AUTO: 41.8 % (ref 36.5–49.3)
HGB BLD-MCNC: 14.5 G/DL (ref 12–17)
LIPASE SERPL-CCNC: 390 U/L (ref 73–393)
LYMPHOCYTES # BLD AUTO: 1.98 THOUSANDS/ΜL (ref 0.6–4.47)
LYMPHOCYTES NFR BLD AUTO: 17 % (ref 14–44)
MCH RBC QN AUTO: 31.7 PG (ref 26.8–34.3)
MCHC RBC AUTO-ENTMCNC: 34.7 G/DL (ref 31.4–37.4)
MCV RBC AUTO: 92 FL (ref 82–98)
MONOCYTES # BLD AUTO: 0.48 THOUSAND/ΜL (ref 0.17–1.22)
MONOCYTES NFR BLD AUTO: 4 % (ref 4–12)
NEUTROPHILS # BLD AUTO: 9.22 THOUSANDS/ΜL (ref 1.85–7.62)
NEUTS SEG NFR BLD AUTO: 78 % (ref 43–75)
P AXIS: 52 DEGREES
P AXIS: 56 DEGREES
P AXIS: 56 DEGREES
PLATELET # BLD AUTO: 389 THOUSANDS/UL (ref 149–390)
PMV BLD AUTO: 9.1 FL (ref 8.9–12.7)
POTASSIUM SERPL-SCNC: 3.9 MMOL/L (ref 3.5–5.3)
PR INTERVAL: 162 MS
PR INTERVAL: 166 MS
PR INTERVAL: 168 MS
PROT SERPL-MCNC: 7.9 G/DL (ref 6.4–8.2)
QRS AXIS: 16 DEGREES
QRS AXIS: 30 DEGREES
QRS AXIS: 30 DEGREES
QRSD INTERVAL: 90 MS
QRSD INTERVAL: 92 MS
QRSD INTERVAL: 94 MS
QT INTERVAL: 370 MS
QT INTERVAL: 380 MS
QT INTERVAL: 400 MS
QTC INTERVAL: 452 MS
QTC INTERVAL: 453 MS
QTC INTERVAL: 470 MS
RBC # BLD AUTO: 4.57 MILLION/UL (ref 3.88–5.62)
SODIUM SERPL-SCNC: 134 MMOL/L (ref 136–145)
SPECIMEN SOURCE: NORMAL
T WAVE AXIS: 46 DEGREES
T WAVE AXIS: 49 DEGREES
T WAVE AXIS: 52 DEGREES
TROPONIN I BLD-MCNC: 0 NG/ML (ref 0–0.08)
TROPONIN I SERPL-MCNC: <0.02 NG/ML
TROPONIN I SERPL-MCNC: <0.02 NG/ML
VENTRICULAR RATE: 83 BPM
VENTRICULAR RATE: 85 BPM
VENTRICULAR RATE: 90 BPM
WBC # BLD AUTO: 11.79 THOUSAND/UL (ref 4.31–10.16)

## 2018-01-19 PROCEDURE — 93005 ELECTROCARDIOGRAM TRACING: CPT | Performed by: EMERGENCY MEDICINE

## 2018-01-19 PROCEDURE — 84484 ASSAY OF TROPONIN QUANT: CPT | Performed by: EMERGENCY MEDICINE

## 2018-01-19 PROCEDURE — 96376 TX/PRO/DX INJ SAME DRUG ADON: CPT

## 2018-01-19 PROCEDURE — 93005 ELECTROCARDIOGRAM TRACING: CPT

## 2018-01-19 PROCEDURE — 74174 CTA ABD&PLVS W/CONTRAST: CPT

## 2018-01-19 PROCEDURE — C9113 INJ PANTOPRAZOLE SODIUM, VIA: HCPCS | Performed by: INTERNAL MEDICINE

## 2018-01-19 PROCEDURE — 96375 TX/PRO/DX INJ NEW DRUG ADDON: CPT

## 2018-01-19 PROCEDURE — 84484 ASSAY OF TROPONIN QUANT: CPT

## 2018-01-19 PROCEDURE — 99285 EMERGENCY DEPT VISIT HI MDM: CPT

## 2018-01-19 PROCEDURE — 71046 X-RAY EXAM CHEST 2 VIEWS: CPT

## 2018-01-19 PROCEDURE — 84484 ASSAY OF TROPONIN QUANT: CPT | Performed by: INTERNAL MEDICINE

## 2018-01-19 PROCEDURE — 71275 CT ANGIOGRAPHY CHEST: CPT

## 2018-01-19 PROCEDURE — 83690 ASSAY OF LIPASE: CPT | Performed by: EMERGENCY MEDICINE

## 2018-01-19 PROCEDURE — 85025 COMPLETE CBC W/AUTO DIFF WBC: CPT | Performed by: EMERGENCY MEDICINE

## 2018-01-19 PROCEDURE — 96374 THER/PROPH/DIAG INJ IV PUSH: CPT

## 2018-01-19 PROCEDURE — 80053 COMPREHEN METABOLIC PANEL: CPT | Performed by: EMERGENCY MEDICINE

## 2018-01-19 PROCEDURE — 36415 COLL VENOUS BLD VENIPUNCTURE: CPT | Performed by: EMERGENCY MEDICINE

## 2018-01-19 PROCEDURE — 96361 HYDRATE IV INFUSION ADD-ON: CPT

## 2018-01-19 RX ORDER — ONDANSETRON 2 MG/ML
INJECTION INTRAMUSCULAR; INTRAVENOUS
Status: COMPLETED
Start: 2018-01-19 | End: 2018-01-19

## 2018-01-19 RX ORDER — FENTANYL CITRATE 50 UG/ML
INJECTION, SOLUTION INTRAMUSCULAR; INTRAVENOUS
Status: COMPLETED
Start: 2018-01-19 | End: 2018-01-19

## 2018-01-19 RX ORDER — PANTOPRAZOLE SODIUM 40 MG/1
40 INJECTION, POWDER, FOR SOLUTION INTRAVENOUS EVERY 12 HOURS SCHEDULED
Status: DISCONTINUED | OUTPATIENT
Start: 2018-01-19 | End: 2018-01-21 | Stop reason: HOSPADM

## 2018-01-19 RX ORDER — SUCRALFATE ORAL 1 G/10ML
1000 SUSPENSION ORAL EVERY 6 HOURS SCHEDULED
Status: DISCONTINUED | OUTPATIENT
Start: 2018-01-19 | End: 2018-01-21 | Stop reason: HOSPADM

## 2018-01-19 RX ORDER — FOLIC ACID 1 MG/1
1 TABLET ORAL DAILY
Status: DISCONTINUED | OUTPATIENT
Start: 2018-01-19 | End: 2018-01-21 | Stop reason: HOSPADM

## 2018-01-19 RX ORDER — NICOTINE 21 MG/24HR
1 PATCH, TRANSDERMAL 24 HOURS TRANSDERMAL DAILY
Status: DISCONTINUED | OUTPATIENT
Start: 2018-01-20 | End: 2018-01-21 | Stop reason: HOSPADM

## 2018-01-19 RX ORDER — SODIUM CHLORIDE 9 MG/ML
125 INJECTION, SOLUTION INTRAVENOUS CONTINUOUS
Status: DISCONTINUED | OUTPATIENT
Start: 2018-01-19 | End: 2018-01-19

## 2018-01-19 RX ORDER — MORPHINE SULFATE 2 MG/ML
2 INJECTION, SOLUTION INTRAMUSCULAR; INTRAVENOUS EVERY 4 HOURS PRN
Status: DISCONTINUED | OUTPATIENT
Start: 2018-01-19 | End: 2018-01-21 | Stop reason: HOSPADM

## 2018-01-19 RX ORDER — ONDANSETRON 2 MG/ML
4 INJECTION INTRAMUSCULAR; INTRAVENOUS ONCE
Status: COMPLETED | OUTPATIENT
Start: 2018-01-19 | End: 2018-01-19

## 2018-01-19 RX ORDER — ACETAMINOPHEN 325 MG/1
650 TABLET ORAL EVERY 6 HOURS PRN
Status: DISCONTINUED | OUTPATIENT
Start: 2018-01-19 | End: 2018-01-21 | Stop reason: HOSPADM

## 2018-01-19 RX ORDER — MORPHINE SULFATE 4 MG/ML
4 INJECTION, SOLUTION INTRAMUSCULAR; INTRAVENOUS ONCE
Status: COMPLETED | OUTPATIENT
Start: 2018-01-19 | End: 2018-01-19

## 2018-01-19 RX ORDER — OXYCODONE HYDROCHLORIDE 5 MG/1
5 TABLET ORAL EVERY 4 HOURS PRN
Status: DISCONTINUED | OUTPATIENT
Start: 2018-01-19 | End: 2018-01-21 | Stop reason: HOSPADM

## 2018-01-19 RX ORDER — FENTANYL CITRATE 50 UG/ML
50 INJECTION, SOLUTION INTRAMUSCULAR; INTRAVENOUS ONCE
Status: COMPLETED | OUTPATIENT
Start: 2018-01-19 | End: 2018-01-19

## 2018-01-19 RX ORDER — LORAZEPAM 2 MG/ML
1 INJECTION INTRAMUSCULAR EVERY 6 HOURS PRN
Status: DISCONTINUED | OUTPATIENT
Start: 2018-01-19 | End: 2018-01-21 | Stop reason: HOSPADM

## 2018-01-19 RX ORDER — SODIUM CHLORIDE, SODIUM LACTATE, POTASSIUM CHLORIDE, CALCIUM CHLORIDE 600; 310; 30; 20 MG/100ML; MG/100ML; MG/100ML; MG/100ML
200 INJECTION, SOLUTION INTRAVENOUS CONTINUOUS
Status: DISCONTINUED | OUTPATIENT
Start: 2018-01-19 | End: 2018-01-20

## 2018-01-19 RX ORDER — METOPROLOL TARTRATE 5 MG/5ML
2.5 INJECTION INTRAVENOUS EVERY 6 HOURS PRN
Status: DISCONTINUED | OUTPATIENT
Start: 2018-01-19 | End: 2018-01-21 | Stop reason: HOSPADM

## 2018-01-19 RX ORDER — ATORVASTATIN CALCIUM 10 MG/1
10 TABLET, FILM COATED ORAL DAILY
Status: DISCONTINUED | OUTPATIENT
Start: 2018-01-20 | End: 2018-01-21 | Stop reason: HOSPADM

## 2018-01-19 RX ORDER — THIAMINE MONONITRATE (VIT B1) 100 MG
100 TABLET ORAL DAILY
Status: DISCONTINUED | OUTPATIENT
Start: 2018-01-19 | End: 2018-01-21 | Stop reason: HOSPADM

## 2018-01-19 RX ORDER — LISINOPRIL 20 MG/1
20 TABLET ORAL DAILY
Status: DISCONTINUED | OUTPATIENT
Start: 2018-01-20 | End: 2018-01-20

## 2018-01-19 RX ORDER — ASPIRIN 81 MG/1
324 TABLET, CHEWABLE ORAL ONCE
Status: COMPLETED | OUTPATIENT
Start: 2018-01-19 | End: 2018-01-19

## 2018-01-19 RX ADMIN — MORPHINE SULFATE 2 MG: 2 INJECTION, SOLUTION INTRAMUSCULAR; INTRAVENOUS at 21:24

## 2018-01-19 RX ADMIN — METOPROLOL TARTRATE 2.5 MG: 5 INJECTION, SOLUTION INTRAVENOUS at 15:23

## 2018-01-19 RX ADMIN — Medication 100 MG: at 15:11

## 2018-01-19 RX ADMIN — ASPIRIN 81 MG 324 MG: 81 TABLET ORAL at 11:37

## 2018-01-19 RX ADMIN — OXYCODONE HYDROCHLORIDE 5 MG: 5 TABLET ORAL at 23:55

## 2018-01-19 RX ADMIN — FENTANYL CITRATE 50 MCG: 50 INJECTION, SOLUTION INTRAMUSCULAR; INTRAVENOUS at 11:12

## 2018-01-19 RX ADMIN — FENTANYL CITRATE 50 MCG: 50 INJECTION INTRAMUSCULAR; INTRAVENOUS at 11:12

## 2018-01-19 RX ADMIN — ONDANSETRON 4 MG: 2 INJECTION INTRAMUSCULAR; INTRAVENOUS at 11:10

## 2018-01-19 RX ADMIN — MORPHINE SULFATE 4 MG: 4 INJECTION INTRAVENOUS at 14:30

## 2018-01-19 RX ADMIN — ONDANSETRON 4 MG: 2 INJECTION INTRAMUSCULAR; INTRAVENOUS at 11:48

## 2018-01-19 RX ADMIN — FOLIC ACID 1 MG: 1 TABLET ORAL at 15:11

## 2018-01-19 RX ADMIN — LORAZEPAM 1 MG: 2 INJECTION INTRAMUSCULAR; INTRAVENOUS at 18:45

## 2018-01-19 RX ADMIN — SODIUM CHLORIDE 1000 ML: 0.9 INJECTION, SOLUTION INTRAVENOUS at 11:09

## 2018-01-19 RX ADMIN — SODIUM CHLORIDE 1000 ML: 0.9 INJECTION, SOLUTION INTRAVENOUS at 14:32

## 2018-01-19 RX ADMIN — SODIUM CHLORIDE 125 ML/HR: 0.9 INJECTION, SOLUTION INTRAVENOUS at 20:37

## 2018-01-19 RX ADMIN — SODIUM CHLORIDE, POTASSIUM CHLORIDE, SODIUM LACTATE AND CALCIUM CHLORIDE 200 ML/HR: 600; 310; 30; 20 INJECTION, SOLUTION INTRAVENOUS at 21:28

## 2018-01-19 RX ADMIN — METOPROLOL TARTRATE 25 MG: 25 TABLET ORAL at 21:44

## 2018-01-19 RX ADMIN — SUCRALFATE 1000 MG: 1 SUSPENSION ORAL at 23:53

## 2018-01-19 RX ADMIN — MORPHINE SULFATE 4 MG: 4 INJECTION INTRAVENOUS at 11:49

## 2018-01-19 RX ADMIN — METOPROLOL TARTRATE 2.5 MG: 5 INJECTION, SOLUTION INTRAVENOUS at 18:09

## 2018-01-19 RX ADMIN — SUCRALFATE 1000 MG: 1 SUSPENSION ORAL at 18:13

## 2018-01-19 RX ADMIN — OXYCODONE HYDROCHLORIDE 5 MG: 5 TABLET ORAL at 15:11

## 2018-01-19 RX ADMIN — PANTOPRAZOLE SODIUM 40 MG: 40 INJECTION, POWDER, FOR SOLUTION INTRAVENOUS at 15:07

## 2018-01-19 RX ADMIN — IOHEXOL 100 ML: 350 INJECTION, SOLUTION INTRAVENOUS at 12:28

## 2018-01-19 NOTE — ED NOTES
Spoke with Dr Peacock from [place]    Due to /112, he advised to give additional dose lopressor 2 5mg       Rachna Garces  01/19/18 1871

## 2018-01-19 NOTE — CONSULTS
Consultation - [facility]) [facility]  Que Thomas 56 y o  male MRN: [id number]  Unit/Bed#: ED 07 Encounter: [id number]        Consults    Reason for Consult / Principal Problem: acute epigastric pain    ASSESSMENT and PLAN:    Principal Problem:    Acute duodenitis  Active Problems:    Hypertension    Acute pancreatitis    GERD (gastroesophageal reflux disease)    #1  Acute epigastric pain:  Likely secondary to recurrent acute pancreatitis based on his symptoms but cannot rule out duodenitis/peptic ulcer disease  Likely induced by a significant alcohol use  Patient has a history of pancreatitis reports that his pain is very similar to his last episode back in December  Currently lipase is normal at 390  Suspect this may increase by tomorrow morning as his symptoms just started this morning  CT scan shows fatty infiltration surrounding the descending duodenum and pain creatinine go duodenal groove which may indicate duodenitis and/or recurrent pancreatitis  -continue to monitor labs  Repeat lipase in the morning   -monitor white blood cell count  Currently 11 79  -NPO  -aggressive fluid hydration  -Protonix and Carafate  -if patient continues to have pain but does not have a spike in his lipase, consider upper endoscopy to further evaluate the duodenum for duodenitis    #2  Colon cancer screening  -Outpatient colonoscopy    -------------------------------------------------------------------------------------------------------------------    HPI:  This is a 80-year-old male with history of hypertension, hyperlipidemia, alcohol abuse, and history of acute pancreatitis in December who presents for  acute epigastric pain that began this morning  He reports that the pain is identical to the pain he had previously when he had acute pancreatitis in the past   He reports that he also had associated nausea and vomiting today  He denies any blood in the vomit    He reports he had a bowel movement this morning that was normal  Denies any diarrhea, constipation, or blood in the stool  He reports alcohol use  He had about 5 to 6 drinks yesterday  He reports that he does not drink on a daily basis but does drink frequently  He reports taking over-the-counter acid reducing medicine for heartburn  He denies any difficulty swallowing  He denies any significant NSAID use    The patient denies ever having upper endoscopy or colonoscopy  REVIEW OF SYSTEMS:    CONSTITUTIONAL: Denies any fever, chills, or rigors  Good appetite, and no recent weight loss  HEENT: No earache or tinnitus  Denies hearing loss or visual disturbances  CARDIOVASCULAR: No chest pain or palpitations  RESPIRATORY: Denies any cough, hemoptysis, shortness of breath or dyspnea on exertion  GASTROINTESTINAL: As noted in the History of Present Illness  GENITOURINARY: No problems with urination  Denies any hematuria or dysuria  NEUROLOGIC: No dizziness or vertigo, denies headaches  MUSCULOSKELETAL: Denies any muscle or joint pain  SKIN: Denies skin rashes or itching  ENDOCRINE: Denies excessive thirst  Denies intolerance to heat or cold  PSYCHOSOCIAL: Denies depression or anxiety  Denies any recent memory loss  Historical Information   Past Medical History:   Diagnosis Date    Hyperlipidemia     Hypertension     Pancreatitis      History reviewed  No pertinent surgical history  Social History   History   Alcohol Use    Yes     Comment: "alot"     History   Drug Use No     History   Smoking Status    Current Every Day Smoker    Packs/day: 1 00    Types: Cigarettes   Smokeless Tobacco    Never Used     History reviewed  No pertinent family history      Meds/Allergies       (Not in a hospital admission)  Current Facility-Administered Medications   Medication Dose Route Frequency    folic acid (FOLVITE) tablet 1 mg  1 mg Oral Daily    LORazepam (ATIVAN) 2 mg/mL injection 1 mg  1 mg Intravenous Q6H PRN    metoprolol (LOPRESSOR) injection 2 5 mg  2 5 mg Intravenous Q6H PRN    morphine injection 2 mg  2 mg Intravenous Q4H PRN    oxyCODONE (ROXICODONE) IR tablet 5 mg  5 mg Oral Q4H PRN    pantoprazole (PROTONIX) injection 40 mg  40 mg Intravenous Q12H [address]    sodium chloride 0 9 % bolus 1,000 mL  1,000 mL Intravenous Once    sucralfate (CARAFATE) oral suspension 1,000 mg  1,000 mg Oral Q6H [address]    thiamine (VITAMIN B1) tablet 100 mg  100 mg Oral Daily       Allergies   Allergen Reactions    Other GI Intolerance     amlodipijne- benzepril           Objective     Blood pressure (!) 169/102, pulse 82, temperature 98 2 °F (36 8 °C), temperature source Oral, resp  rate 18, SpO2 96 %  Intake/Output Summary (Last 24 hours) at 01/19/18 1522  Last data filed at 01/19/18 1431   Gross per 24 hour   Intake             1000 ml   Output                0 ml   Net             1000 ml         PHYSICAL EXAM:      General Appearance:   Alert, cooperative, no distress, appears stated age    HEENT:   Normocephalic, atraumatic, anicteric, no oropharyngeal thrush present      Neck:  Supple, symmetrical, trachea midline, no adenopathy;    thyroid: no enlargement/tenderness/nodules; no carotid  bruit or JVD    Lungs:   Clear to auscultation bilaterally; no rales, rhonchi or wheezing; respirations unlabored    Heart[de-identified]   S1 and S2 normal; regular rate and rhythm; no murmur, rub, or gallop     Abdomen:   Soft, epigastric tenderness, non-distended; normal bowel sounds; no masses, no organomegaly    Genitalia:   Deferred    Rectal:   Deferred    Extremities:  No cyanosis, clubbing or edema    Pulses:  2+ and symmetric all extremities    Skin:  Skin color, texture, turgor normal, no rashes or lesions    Lymph nodes:  No palpable cervical, axillary or inguinal lymphadenopathy        Lab Results:     Results from last 7 days  Lab Units 01/19/18  1057   WBC Thousand/uL 11 79*   HEMOGLOBIN g/dL 14 5   HEMATOCRIT % 41 8   PLATELETS Thousands/uL 389 NEUTROS PCT % 78*   LYMPHS PCT % 17   MONOS PCT % 4   EOS PCT % 1       Results from last 7 days  Lab Units 01/19/18  1057   SODIUM mmol/L 134*   POTASSIUM mmol/L 3 9   CHLORIDE mmol/L 97*   CO2 mmol/L 26   BUN mg/dL 12   CREATININE mg/dL 0 80   CALCIUM mg/dL 8 4   TOTAL PROTEIN g/dL 7 9   BILIRUBIN TOTAL mg/dL 0 30   ALK PHOS U/L 90   ALT U/L 31   AST U/L 15   GLUCOSE RANDOM mg/dL 132           Results from last 7 days  Lab Units 01/19/18  1057   LIPASE u/L 390       Imaging Studies: I have personally reviewed pertinent imaging studies  X-ray Chest 2 Views    Result Date: 1/19/2018  Impression: Bleb or cavitary lesion in the right lung apex  Please refer to CT report performed the same day  Workstation performed: [id number]     Cta Chest Abdomen Pelvis W Wo Contrast    Result Date: 1/19/2018  Impression: 1  No evidence of aortic aneurysm or dissection  2  Increasing fatty infiltration surrounding the descending duodenum and pancreaticoduodenal groove which may be indicative of duodenitis and/or recurrent pancreatitis  No focal fluid collections or evidence of pancreatic necrosis  3  Mild COPD  No evidence of cavitary lesion  Right apical blebs account for recent chest film finding  Small right lung nodules measuring up to 4 mm  Based on current Fleischner Society 2017 Guidelines on incidental pulmonary nodule, follow-up non-contrast chest CT is recommended in 12 months given the patient is at higher risk for lung cancer  If stable, no further follow-up may be necessary  Workstation performed: [id number]           Patient was seen and examined by Dr Eduardo Glover  All dee medical decisions were made by Dr Eduardo Glover  Thank you for allowing us to participate in the care of this present patient  We will follow-up with you closely

## 2018-01-19 NOTE — ED PROVIDER NOTES
History  Chief Complaint   Patient presents with    Chest Pain     Substernal chest pain with SOB, onset this AM    Abdominal Pain     Pt c/o abd pain with nausea, onset this AM       History provided by:  Patient   used: No    Chest Pain   Associated symptoms: abdominal pain, diaphoresis, nausea, shortness of breath and vomiting    Associated symptoms: no cough, no dizziness, no fever, no headache, no palpitations and no weakness    Abdominal Pain   Associated symptoms: chest pain, nausea, shortness of breath and vomiting    Associated symptoms: no chills, no cough, no diarrhea, no dysuria, no fever and no sore throat      Patient is a 41-year-old male presenting to emergency department chest pain  Started at 8:30 a m  Lupis Burow Also abdominal pain  Crushing  Shortness of breath  Diaphoresis  Nausea vomiting  Feels like previous pancreatitis  No diarrhea  No history of heart attack  Drinks  Smoker  No drug use  Has high cholesterol  Has hypertension  Not diabetic  MDM concern for cardiac disease or pancreatitis  Will do cardiac workup  Pain control  Check lipase and CMP  Re-evaluate          Prior to Admission Medications   Prescriptions Last Dose Informant Patient Reported? Taking?   atorvastatin (LIPITOR) 10 mg tablet   Yes Yes   Sig: Take 10 mg by mouth daily   lansoprazole (PREVACID) 30 mg capsule   Yes Yes   Sig: Take 25 mg by mouth daily   lisinopril (ZESTRIL) 20 mg tablet   No Yes   Sig: Take 1 tablet by mouth daily   metoprolol tartrate (LOPRESSOR) 25 mg tablet   Yes Yes   Sig: Take 25 mg by mouth every 12 (twelve) hours      Facility-Administered Medications: None       Past Medical History:   Diagnosis Date    Hyperlipidemia     Hypertension     Pancreatitis        History reviewed  No pertinent surgical history  History reviewed  No pertinent family history  I have reviewed and agree with the history as documented      Social History   Substance Use Topics    Smoking status: Current Every Day Smoker     Packs/day: 1 00     Types: Cigarettes    Smokeless tobacco: Never Used    Alcohol use Yes      Comment: "alot"        Review of Systems   Constitutional: Positive for diaphoresis  Negative for chills and fever  HENT: Negative for congestion and sore throat  Respiratory: Positive for shortness of breath  Negative for cough, wheezing and stridor  Cardiovascular: Positive for chest pain  Negative for palpitations and leg swelling  Gastrointestinal: Positive for abdominal pain, nausea and vomiting  Negative for blood in stool and diarrhea  Genitourinary: Negative for dysuria, frequency and urgency  Musculoskeletal: Negative for neck pain and neck stiffness  Skin: Negative for pallor and rash  Neurological: Negative for dizziness, syncope, weakness, light-headedness and headaches  All other systems reviewed and are negative  Physical Exam  ED Triage Vitals   Temperature Pulse Respirations Blood Pressure SpO2   01/19/18 1041 01/19/18 1041 01/19/18 1041 01/19/18 1041 01/19/18 1041   98 2 °F (36 8 °C) 96 16 (!) 182/108 96 %      Temp Source Heart Rate Source Patient Position - Orthostatic VS BP Location FiO2 (%)   01/19/18 1041 01/19/18 1138 01/19/18 1138 01/19/18 1138 --   Oral Monitor Lying Left arm       Pain Score       01/19/18 1041       9           Orthostatic Vital Signs  Vitals:    01/19/18 1200 01/19/18 1245 01/19/18 1400 01/19/18 1430   BP: 158/97 (!) 178/109 154/97 (!) 169/102   Pulse: 82 78 78 82   Patient Position - Orthostatic VS: Sitting Sitting Lying Lying       Physical Exam   Constitutional: He is oriented to person, place, and time  He appears well-developed and well-nourished  HENT:   Head: Normocephalic and atraumatic  Eyes: EOM are normal  Pupils are equal, round, and reactive to light  Neck: Normal range of motion  Neck supple  Cardiovascular: Normal rate, regular rhythm, normal heart sounds and intact distal pulses  Pulmonary/Chest: Effort normal and breath sounds normal  No respiratory distress  Abdominal: Soft  Bowel sounds are normal  There is tenderness  Epigastric   Musculoskeletal: Normal range of motion  He exhibits no edema or tenderness  Neurological: He is alert and oriented to person, place, and time  Skin: Skin is warm and dry  No erythema  No pallor  Vitals reviewed        ED Medications  Medications   sodium chloride 0 9 % bolus 1,000 mL (1,000 mL Intravenous New Bag 1/19/18 1432)   pantoprazole (PROTONIX) injection 40 mg (not administered)   sucralfate (CARAFATE) oral suspension 1,000 mg (not administered)   morphine injection 2 mg (not administered)   oxyCODONE (ROXICODONE) IR tablet 5 mg (not administered)   metoprolol (LOPRESSOR) injection 2 5 mg (not administered)   LORazepam (ATIVAN) 2 mg/mL injection 1 mg (not administered)   folic acid (FOLVITE) tablet 1 mg (not administered)   thiamine (VITAMIN B1) tablet 100 mg (not administered)   fentanyl citrate (PF) 100 MCG/2ML 50 mcg (50 mcg Intravenous Given 1/19/18 1112)   sodium chloride 0 9 % bolus 1,000 mL (0 mL Intravenous Stopped 1/19/18 1431)   ondansetron (ZOFRAN) injection 4 mg (4 mg Intravenous Given 1/19/18 1110)   aspirin chewable tablet 324 mg (324 mg Oral Given 1/19/18 1137)   morphine (PF) 4 mg/mL injection 4 mg (4 mg Intravenous Given 1/19/18 1149)   ondansetron (ZOFRAN) injection 4 mg (4 mg Intravenous Given 1/19/18 1148)   iohexol (OMNIPAQUE) 350 MG/ML injection (MULTI-DOSE) 100 mL (100 mL Intravenous Given 1/19/18 1228)   morphine (PF) 4 mg/mL injection 4 mg (4 mg Intravenous Given 1/19/18 1430)       Diagnostic Studies  Results Reviewed     Procedure Component Value Units Date/Time    Troponin I [[id number]]     Lab Status:  No result Specimen:  Blood     Lipase [[id number]]  (Normal) Collected:  01/19/18 1057    Lab Status:  Final result Specimen:  Blood from Arm, Right Updated:  01/19/18 1156     Lipase 390 u/L     Comprehensive metabolic panel [[id number]]  (Abnormal) Collected:  01/19/18 1057    Lab Status:  Final result Specimen:  Blood from Arm, Right Updated:  01/19/18 1146     Sodium 134 (L) mmol/L      Potassium 3 9 mmol/L      Chloride 97 (L) mmol/L      CO2 26 mmol/L      Anion Gap 11 mmol/L      BUN 12 mg/dL      Creatinine 0 80 mg/dL      Glucose 132 mg/dL      Calcium 8 4 mg/dL      AST 15 U/L      ALT 31 U/L      Alkaline Phosphatase 90 U/L      Total Protein 7 9 g/dL      Albumin 3 6 g/dL      Total Bilirubin 0 30 mg/dL      eGFR 107 ml/min/1 73sq m     Narrative:         National Kidney Disease Education Program recommendations are as follows:  GFR calculation is accurate only with a steady state creatinine  Chronic Kidney disease less than 60 ml/min/1 73 sq  meters  Kidney failure less than 15 ml/min/1 73 sq  meters  Troponin I [[id number]]  (Normal) Collected:  01/19/18 1057    Lab Status:  Final result Specimen:  Blood from Arm, Right Updated:  01/19/18 1133     Troponin I <0 02 ng/mL     Narrative:         [employer] Chemistry analyzer 99% cutoff is > 0 04 ng/mL in network labs    o cTnI 99% cutoff is useful only when applied to patients in the clinical setting of myocardial ischemia  o cTnI 99% cutoff should be interpreted in the context of clinical history, ECG findings and possibly cardiac imaging to establish correct diagnosis  o cTnI 99% cutoff may be suggestive but clearly not indicative of a coronary event without the clinical setting of myocardial ischemia      POCT troponin [[id number]]  (Normal) Collected:  01/19/18 1111    Lab Status:  Final result Updated:  01/19/18 1125     POC Troponin I 0 00 ng/ml      Specimen Type VENOUS    Narrative:         [employer] i-Stat handheld analyzer 99% cutoff is > 0 08ng/mL in network Emergency Departments    o cTnI 99% cutoff is useful only when applied to patients in the clinical setting of myocardial ischemia  o cTnI 99% cutoff should be interpreted in the context of clinical history, ECG findings and possibly cardiac imaging to establish correct diagnosis  o cTnI 99% cutoff may be suggestive but clearly not indicative of a coronary event without the clinical setting of myocardial ischemia  CBC and differential [[id number]]  (Abnormal) Collected:  01/19/18 1057    Lab Status:  Final result Specimen:  Blood from Arm, Right Updated:  01/19/18 1107     WBC 11 79 (H) Thousand/uL      RBC 4 57 Million/uL      Hemoglobin 14 5 g/dL      Hematocrit 41 8 %      MCV 92 fL      MCH 31 7 pg      MCHC 34 7 g/dL      RDW 13 5 %      MPV 9 1 fL      Platelets 072 Thousands/uL      Neutrophils Relative 78 (H) %      Lymphocytes Relative 17 %      Monocytes Relative 4 %      Eosinophils Relative 1 %      Basophils Relative 0 %      Neutrophils Absolute 9 22 (H) Thousands/µL      Lymphocytes Absolute 1 98 Thousands/µL      Monocytes Absolute 0 48 Thousand/µL      Eosinophils Absolute 0 06 Thousand/µL      Basophils Absolute 0 05 Thousands/µL                  CTA chest abdomen pelvis w wo contrast   Final Result by Gagandeep Ugarte DO (01/19 0322)      1  No evidence of aortic aneurysm or dissection  2  Increasing fatty infiltration surrounding the descending duodenum and pancreaticoduodenal groove which may be indicative of duodenitis and/or recurrent pancreatitis  No focal fluid collections or evidence of pancreatic necrosis  3  Mild COPD  No evidence of cavitary lesion  Right apical blebs account for recent chest film finding  Small right lung nodules measuring up to 4 mm  Based on current Fleischner Society 2017 Guidelines on incidental pulmonary nodule, follow-up    non-contrast chest CT is recommended in 12 months given the patient is at higher risk for lung cancer  If stable, no further follow-up may be necessary  Workstation performed: [id number]         X-ray chest 2 views   Final Result by Danny Irving MD (01/19 1158)   Bleb or cavitary lesion in the right lung apex  Please refer to CT report performed the same day  Workstation performed: [id number]                    Procedures  Procedures       Phone Contacts  ED Phone Contact    ED Course  ED Course                                MDM  CritCare Time    Disposition  Final diagnoses:   Pancreatitis     Time reflects when diagnosis was documented in both MDM as applicable and the Disposition within this note     Time User Action Codes Description Comment    1/19/2018  2:21 PM Mirta Espinoza Add [K85 90] Pancreatitis     1/19/2018  2:46 PM Coretha Leydi Add [K29 80] Acute duodenitis     1/19/2018  2:46 PM Coretha Leydi Modify [K29 80] Acute duodenitis       ED Disposition     ED Disposition Condition Comment    Admit  Case was discussed with medicine and the patient's admission status was agreed to be Admission Status: inpatient status to the service of Dr Meng Mccall   Follow-up Information    None       Patient's Medications   Discharge Prescriptions    No medications on file     No discharge procedures on file      ED Provider  Electronically Signed by           Trever Valderrama MD  01/19/18 [id number]

## 2018-01-19 NOTE — LETTER
January 21, 2018     Patient: Derick Gee [facility]   YOB: 1970   Date of Visit: 1/19/2018       To Whom It May Concern: It is my medical opinion that Kimberly Cameron may return to full duty immediately with no restrictions  He was hospitalized from 01/19/2018 through 01/21/2018 and received narcotic medications for pain  If you have any questions or concerns, please don't hesitate to call           Sincerely,        Nicky Kaur PA-C  [phone]

## 2018-01-19 NOTE — H&P
History and Physical - [facility] Internal Medicine    Patient Information: Derek Mackey 56 y o  male MRN: [id number]  Unit/Bed#: ED 07 Encounter: [id number]  Admitting Physician: Marcy Ng DO  PCP: Rakesh Ni MD  Date of Admission:  01/19/18    Assessment/Plan:    Hospital Problem List:     Principal Problem:    Acute duodenitis  Active Problems:    Hypertension    Acute pancreatitis    GERD (gastroesophageal reflux disease)      Plan     1  Acute abdominal pain likely secondary to duodenitis/ GERD  Suspect secondary to alcohol,  Normal lipase level, admit patient to the hospital start IV Protonix, Carafate, pain control and supportive care, consult GI for further management  2  Accelerated Hypertension secondary to pain, continue metoprolol and lisinopril  3  Alcohol abuse, watch for delirium tremens and withdrawal symptoms, start folic acid and thiamine ,continue beta-blocker, add Ativan p r n   4   Left-sided chest pain, negative EKG for acute ischemic changes, negative troponin, suspect referred pain secondary to 1   5   Hyperlipidemia, continue statin, check fasting lipid profile  6  Mild COPD on chest CT with right lung nodules, repeat CT scan of the chest in 12 months    VTE Prophylaxis: Enoxaparin (Lovenox)  / sequential compression device   Code Status: full code  POLST: There is no POLST form on file for this patient (pre-hospital)    Anticipated Length of Stay:  Patient will be admitted on an Inpatient basis with an anticipated length of stay of  > 2 midnights  Justification for Hospital Stay:  Management of acute duodenitis    Total Time for Visit, including Counseling / Coordination of Care: 1 hour  Greater than 50% of this total time spent on direct patient counseling and coordination of care  Chief Complaint:     Severe abdominal pain    History of Present Illness:    Derek Mackey is a 56 y o  male who presents with severe epigastric pain started today    Patient with heavy alcohol drinking, was admitted here in December due to alcoholic pancreatitis , presented today with severe epigastric pain radiated to the left side of his chest associated with nausea vomiting  No fever or chills no palpitation or shortness of breath  Patient admitted drinking alcohol yesterday    He was evaluated in the emergency room, no fever, EKG without acute ischemic changes, normal troponin and normal lipase level  Chest abdomen pelvis CT scan was negative for aortic aneurysm or dissection, positive for increasing fatty infiltration surrounding the descending duodenum concerning for acute duodenitis    Review of Systems:    Review of Systems   Constitutional: Positive for appetite change  Negative for chills and fever  HENT: Negative for sore throat  Respiratory: Negative for shortness of breath  Cardiovascular: Positive for chest pain  Negative for palpitations and leg swelling  Gastrointestinal: Positive for abdominal pain, nausea and vomiting  Negative for blood in stool and diarrhea  Endocrine: Negative for polyuria  Genitourinary: Negative for dysuria  Neurological: Negative for dizziness, speech difficulty and headaches  All other systems reviewed and are negative  Past Medical and Surgical History:     Past Medical History:   Diagnosis Date    Hyperlipidemia     Hypertension     Pancreatitis        History reviewed  No pertinent surgical history  Meds/Allergies:    Prior to Admission medications    Medication Sig Start Date End Date Taking?  Authorizing Provider   atorvastatin (LIPITOR) 10 mg tablet Take 10 mg by mouth daily   Yes Historical Provider, MD   lansoprazole (PREVACID) 30 mg capsule Take 25 mg by mouth daily   Yes Historical Provider, MD   lisinopril (ZESTRIL) 20 mg tablet Take 1 tablet by mouth daily 12/16/17  Yes Benny Louis MD   metoprolol tartrate (LOPRESSOR) 25 mg tablet Take 25 mg by mouth every 12 (twelve) hours   Yes Historical Provider, MD nicotine (NICODERM CQ) 21 mg/24 hr TD 24 hr patch Place 1 patch on the skin daily 12/16/17 1/19/18  Benny Louis MD     I have reviewed home medications with patient personally  Allergies: Allergies   Allergen Reactions    Other GI Intolerance     amlodipijne- benzepril       Social History:     Marital Status: /Civil Union     Substance Use History:   History   Alcohol Use    Yes     Comment: "alot"     History   Smoking Status    Current Every Day Smoker    Packs/day: 1 00    Types: Cigarettes   Smokeless Tobacco    Never Used     History   Drug Use No       Family History:    non-contributory    Physical Exam:     Vitals:   Blood Pressure: (!) 169/102 (01/19/18 1430)  Pulse: 82 (01/19/18 1430)  Temperature: 98 2 °F (36 8 °C) (01/19/18 1041)  Temp Source: Oral (01/19/18 1041)  Respirations: 18 (01/19/18 1430)  SpO2: 96 % (01/19/18 1430)    Physical Exam   Constitutional: He is oriented to person, place, and time  He appears well-developed  No distress  HENT:   Head: Normocephalic and atraumatic  Mouth/Throat: Oropharynx is clear and moist  No oropharyngeal exudate  Eyes: Conjunctivae and EOM are normal  No scleral icterus  Neck: Normal range of motion  Neck supple  No JVD present  Cardiovascular: Normal rate, regular rhythm, normal heart sounds and intact distal pulses  No murmur heard  Pulmonary/Chest: Effort normal and breath sounds normal  No respiratory distress  He has no wheezes  Abdominal: Soft  Bowel sounds are normal  He exhibits no distension  There is tenderness  There is rebound  Severe epigastric tenderness with questionable rebound no guarding   Musculoskeletal: Normal range of motion  He exhibits no edema or tenderness  Lymphadenopathy:     He has no cervical adenopathy  Neurological: He is alert and oriented to person, place, and time  No cranial nerve deficit  Skin: Skin is warm and dry         Additional Data:     Lab Results: I have personally reviewed pertinent reports  Results from last 7 days  Lab Units 01/19/18  1057   WBC Thousand/uL 11 79*   HEMOGLOBIN g/dL 14 5   HEMATOCRIT % 41 8   PLATELETS Thousands/uL 389   NEUTROS PCT % 78*   LYMPHS PCT % 17   MONOS PCT % 4   EOS PCT % 1       Results from last 7 days  Lab Units 01/19/18  1057   SODIUM mmol/L 134*   POTASSIUM mmol/L 3 9   CHLORIDE mmol/L 97*   CO2 mmol/L 26   BUN mg/dL 12   CREATININE mg/dL 0 80   CALCIUM mg/dL 8 4   TOTAL PROTEIN g/dL 7 9   BILIRUBIN TOTAL mg/dL 0 30   ALK PHOS U/L 90   ALT U/L 31   AST U/L 15   GLUCOSE RANDOM mg/dL 132           Imaging: I have personally reviewed pertinent reports  X-ray Chest 2 Views    Result Date: 1/19/2018  Narrative: CHEST - DUAL ENERGY INDICATION:  chest pain  History taken directly from the electronic ordering system  COMPARISON:  12/13/2017 VIEWS:  PA (including soft tissue/bone algorithms) and lateral projections IMAGES:  4 FINDINGS:     Cardiomediastinal silhouette appears unremarkable  Bleb or cavitary lesion in the right lung apex  No pneumothorax or pleural effusion  Visualized osseous structures appear within normal limits for the patient's age  Impression: Bleb or cavitary lesion in the right lung apex  Please refer to CT report performed the same day  Workstation performed: [id number]     Cta Chest Abdomen Pelvis W Wo Contrast    Result Date: 1/19/2018  Narrative: CT ANGIOGRAM OF THE CHEST, ABDOMEN AND PELVIS WITH AND WITHOUT IV CONTRAST INDICATION:  Substernal chest and abdominal pain with nausea and shortness of breath  History of pancreatitis  COMPARISON: CT abdomen and pelvis 12/14/2017, right upper quadrant ultrasound 12/14/2017 chest film 12/13/2017 and earlier today TECHNIQUE:  CT angiogram examination of the chest, abdomen and pelvis was performed according to standard protocol    This examination, like all CT scans performed in the [facility], was performed utilizing techniques to minimize radiation dose exposure, including the use of iterative reconstruction and automated exposure control  Contrast as well as noncontrast images were obtained  3D reconstructions were performed an independent workstation, and are supplied for review  Rad dose 2580 mGy-cm IV Contrast:  100 mL of iohexol (OMNIPAQUE)    Enteric Contrast:  None given FINDINGS: VASCULAR STRUCTURES:  Precontrast images demonstrate no evidence of aortic mural hematoma  There is no evidence of aneurysm or dissection  Atherosclerotic calcifications thoracoabdominal aorta  No evidence of large central pulmonary embolus within limitations, noting the study was not tailored to evaluate the pulmonary arteries  OTHER FINDINGS: CHEST: HEART:       Normal cardiac size  No pericardial effusion  LUNGS:  Small blebs are seen in the peripheral right lung apex corresponding to plain film finding  Element of mild COPD suggested  There is a 4 mm right upper lobe nodule series 2/22   2 mm right lower lobe nodule medially series 2/34  7 mm opacity seen along the right minor fissure series 2/35 likely corresponds to intrafissural lymph node  Similar finding series 3 image 70  PLEURA: Mild biapical pleural thickening  No pleural effusions    MEDIASTINUM AND MANDI:  No mass or significant lymphadenopathy  CHEST WALL AND LOWER NECK:      Normal  ABDOMEN LIVER/BILIARY TREE:  Unremarkable  GALLBLADDER:  No calcified gallstones  No pericholecystic inflammatory change  SPLEEN:  Unremarkable  Normal size  PANCREAS:  The pancreatic parenchyma again demonstrates normal enhancement  There is increasing fatty infiltration seen along the pancreaticoduodenal groove and surrounding the descending duodenum with duodenum itself  Findings could be related to duodenitis and/or recurrent pancreatitis  No focal fluid collections  There is mild fatty involution of the pancreas otherwise noted  ADRENAL GLANDS:  Unremarkable  KIDNEYS/URETERS:  No solid renal mass   No hydronephrosis  No urinary tract calculi  PELVIS REPRODUCTIVE ORGANS:  Unremarkable for patient's age  URINARY BLADDER:  Unremarkable  ADDITIONAL ABDOMINAL AND PELVIC STRUCTURES: STOMACH AND BOWEL:  The stomach is largely collapsed, evaluation limited  The small bowel is normal caliber  The colon is largely collapsed  ABDOMINOPELVIC CAVITY:  No pathologically enlarged mesenteric or retroperitoneal lymph nodes  No ascites or free intraperitoneal air  ABDOMINAL WALL/INGUINAL REGIONS:  There is a small fat-containing umbilical hernia  OSSEOUS STRUCTURES:  No acute fracture or destructive osseous lesion  Paravertebral ossifications thoracic spine  Healed left clavicle and lower sternal fractures  Impression: 1  No evidence of aortic aneurysm or dissection  2  Increasing fatty infiltration surrounding the descending duodenum and pancreaticoduodenal groove which may be indicative of duodenitis and/or recurrent pancreatitis  No focal fluid collections or evidence of pancreatic necrosis  3  Mild COPD  No evidence of cavitary lesion  Right apical blebs account for recent chest film finding  Small right lung nodules measuring up to 4 mm  Based on current Fleischner Society 2017 Guidelines on incidental pulmonary nodule, follow-up non-contrast chest CT is recommended in 12 months given the patient is at higher risk for lung cancer  If stable, no further follow-up may be necessary  Workstation performed: [id number]       EKG, Pathology, and Other Studies Reviewed on Admission:   · EKG:  Personally reviewed showed normal sinus rhythm at 83 beats per minute, no acute ischemic changes    Allscripts / Epic Records Reviewed: Yes     ** Please Note: This note has been constructed using a voice recognition system   **

## 2018-01-20 PROBLEM — E78.2 MIXED HYPERLIPIDEMIA: Status: ACTIVE | Noted: 2018-01-20

## 2018-01-20 PROBLEM — F17.200 TOBACCO DEPENDENCE: Status: ACTIVE | Noted: 2018-01-20

## 2018-01-20 PROBLEM — E78.1 HYPERTRIGLYCERIDEMIA: Status: ACTIVE | Noted: 2018-01-20

## 2018-01-20 LAB
ALBUMIN SERPL BCP-MCNC: 3 G/DL (ref 3.5–5)
ALP SERPL-CCNC: 87 U/L (ref 46–116)
ALT SERPL W P-5'-P-CCNC: 23 U/L (ref 12–78)
ANION GAP SERPL CALCULATED.3IONS-SCNC: 7 MMOL/L (ref 4–13)
AST SERPL W P-5'-P-CCNC: 6 U/L (ref 5–45)
BILIRUB SERPL-MCNC: 0.5 MG/DL (ref 0.2–1)
BUN SERPL-MCNC: 7 MG/DL (ref 5–25)
CALCIUM SERPL-MCNC: 8 MG/DL (ref 8.3–10.1)
CHLORIDE SERPL-SCNC: 100 MMOL/L (ref 100–108)
CHOLEST SERPL-MCNC: 243 MG/DL (ref 50–200)
CO2 SERPL-SCNC: 28 MMOL/L (ref 21–32)
CREAT SERPL-MCNC: 0.86 MG/DL (ref 0.6–1.3)
ERYTHROCYTE [DISTWIDTH] IN BLOOD BY AUTOMATED COUNT: 13.5 % (ref 11.6–15.1)
GFR SERPL CREATININE-BSD FRML MDRD: 104 ML/MIN/1.73SQ M
GLUCOSE SERPL-MCNC: 104 MG/DL (ref 65–140)
HCT VFR BLD AUTO: 39.5 % (ref 36.5–49.3)
HDLC SERPL-MCNC: 21 MG/DL (ref 40–60)
HGB BLD-MCNC: 12.8 G/DL (ref 12–17)
LIPASE SERPL-CCNC: 336 U/L (ref 73–393)
MAGNESIUM SERPL-MCNC: 1.4 MG/DL (ref 1.6–2.6)
MCH RBC QN AUTO: 30.7 PG (ref 26.8–34.3)
MCHC RBC AUTO-ENTMCNC: 32.4 G/DL (ref 31.4–37.4)
MCV RBC AUTO: 95 FL (ref 82–98)
PHOSPHATE SERPL-MCNC: 3.5 MG/DL (ref 2.7–4.5)
PLATELET # BLD AUTO: 338 THOUSANDS/UL (ref 149–390)
PMV BLD AUTO: 9.5 FL (ref 8.9–12.7)
POTASSIUM SERPL-SCNC: 3.6 MMOL/L (ref 3.5–5.3)
PROT SERPL-MCNC: 6.8 G/DL (ref 6.4–8.2)
RBC # BLD AUTO: 4.17 MILLION/UL (ref 3.88–5.62)
SODIUM SERPL-SCNC: 135 MMOL/L (ref 136–145)
TRIGL SERPL-MCNC: 1354 MG/DL
WBC # BLD AUTO: 10.12 THOUSAND/UL (ref 4.31–10.16)

## 2018-01-20 PROCEDURE — C9113 INJ PANTOPRAZOLE SODIUM, VIA: HCPCS | Performed by: INTERNAL MEDICINE

## 2018-01-20 PROCEDURE — 83690 ASSAY OF LIPASE: CPT | Performed by: INTERNAL MEDICINE

## 2018-01-20 PROCEDURE — 83735 ASSAY OF MAGNESIUM: CPT | Performed by: INTERNAL MEDICINE

## 2018-01-20 PROCEDURE — 85027 COMPLETE CBC AUTOMATED: CPT | Performed by: PHYSICIAN ASSISTANT

## 2018-01-20 PROCEDURE — 84100 ASSAY OF PHOSPHORUS: CPT | Performed by: INTERNAL MEDICINE

## 2018-01-20 PROCEDURE — 80053 COMPREHEN METABOLIC PANEL: CPT | Performed by: INTERNAL MEDICINE

## 2018-01-20 PROCEDURE — 80061 LIPID PANEL: CPT | Performed by: INTERNAL MEDICINE

## 2018-01-20 RX ORDER — LISINOPRIL 20 MG/1
40 TABLET ORAL DAILY
Status: DISCONTINUED | OUTPATIENT
Start: 2018-01-21 | End: 2018-01-21 | Stop reason: HOSPADM

## 2018-01-20 RX ORDER — MAGNESIUM SULFATE HEPTAHYDRATE 40 MG/ML
2 INJECTION, SOLUTION INTRAVENOUS ONCE
Status: COMPLETED | OUTPATIENT
Start: 2018-01-20 | End: 2018-01-20

## 2018-01-20 RX ADMIN — FOLIC ACID 1 MG: 1 TABLET ORAL at 08:38

## 2018-01-20 RX ADMIN — METOPROLOL TARTRATE 25 MG: 25 TABLET ORAL at 20:19

## 2018-01-20 RX ADMIN — OXYCODONE HYDROCHLORIDE 5 MG: 5 TABLET ORAL at 06:14

## 2018-01-20 RX ADMIN — PANTOPRAZOLE SODIUM 40 MG: 40 INJECTION, POWDER, FOR SOLUTION INTRAVENOUS at 08:39

## 2018-01-20 RX ADMIN — SUCRALFATE 1000 MG: 1 SUSPENSION ORAL at 11:35

## 2018-01-20 RX ADMIN — SUCRALFATE 1000 MG: 1 SUSPENSION ORAL at 06:14

## 2018-01-20 RX ADMIN — SODIUM CHLORIDE, POTASSIUM CHLORIDE, SODIUM LACTATE AND CALCIUM CHLORIDE 200 ML/HR: 600; 310; 30; 20 INJECTION, SOLUTION INTRAVENOUS at 01:37

## 2018-01-20 RX ADMIN — SODIUM CHLORIDE, POTASSIUM CHLORIDE, SODIUM LACTATE AND CALCIUM CHLORIDE 200 ML/HR: 600; 310; 30; 20 INJECTION, SOLUTION INTRAVENOUS at 06:16

## 2018-01-20 RX ADMIN — ENOXAPARIN SODIUM 40 MG: 40 INJECTION SUBCUTANEOUS at 08:39

## 2018-01-20 RX ADMIN — ATORVASTATIN CALCIUM 10 MG: 10 TABLET, FILM COATED ORAL at 08:38

## 2018-01-20 RX ADMIN — MORPHINE SULFATE 2 MG: 2 INJECTION, SOLUTION INTRAMUSCULAR; INTRAVENOUS at 10:01

## 2018-01-20 RX ADMIN — SUCRALFATE 1000 MG: 1 SUSPENSION ORAL at 23:06

## 2018-01-20 RX ADMIN — SODIUM CHLORIDE, POTASSIUM CHLORIDE, SODIUM LACTATE AND CALCIUM CHLORIDE 200 ML/HR: 600; 310; 30; 20 INJECTION, SOLUTION INTRAVENOUS at 11:36

## 2018-01-20 RX ADMIN — SUCRALFATE 1000 MG: 1 SUSPENSION ORAL at 17:45

## 2018-01-20 RX ADMIN — METOPROLOL TARTRATE 2.5 MG: 5 INJECTION, SOLUTION INTRAVENOUS at 01:45

## 2018-01-20 RX ADMIN — LISINOPRIL 20 MG: 20 TABLET ORAL at 08:38

## 2018-01-20 RX ADMIN — MAGNESIUM SULFATE HEPTAHYDRATE 2 G: 40 INJECTION, SOLUTION INTRAVENOUS at 15:38

## 2018-01-20 RX ADMIN — Medication 100 MG: at 08:38

## 2018-01-20 RX ADMIN — SODIUM CHLORIDE, POTASSIUM CHLORIDE, SODIUM LACTATE AND CALCIUM CHLORIDE 200 ML/HR: 600; 310; 30; 20 INJECTION, SOLUTION INTRAVENOUS at 11:16

## 2018-01-20 RX ADMIN — PANTOPRAZOLE SODIUM 40 MG: 40 INJECTION, POWDER, FOR SOLUTION INTRAVENOUS at 20:19

## 2018-01-20 RX ADMIN — MORPHINE SULFATE 2 MG: 2 INJECTION, SOLUTION INTRAMUSCULAR; INTRAVENOUS at 02:48

## 2018-01-20 RX ADMIN — METOPROLOL TARTRATE 25 MG: 25 TABLET ORAL at 08:38

## 2018-01-20 NOTE — CASE MANAGEMENT
[facility] in the [facility] by Philippe Ennis for 2017  Network Utilization Review Department  Phone: [phone]; Fax [phone]  ATTENTION: The Network Utilization Review Department is now centralized for our 7 Facilities  Please call with any questions or concerns to [phone] and carefully follow the prompts so that you are directed to the right person  All voicemails are confidential  Fax any determinations, approvals, denials, and requests for initial or continue stay review clinical to [phone]  Due to HIGH CALL volume, it would be easier if you could please send faxed requests to expedite your requests and in part, help us provide discharge notifications faster   /////////////////////////////////////////////////////////////////////////////////////////////////////////////////    Initial Clinical Review    Admission: Date/Time/Statement: 1/19/18 @ 1422     Orders Placed This Encounter   Procedures    Inpatient Admission (expected length of stay for this patient is greater than two midnights)     Standing Status:   Standing     Number of Occurrences:   1     Order Specific Question:   Admitting Physician     Answer:   Judith Mesa [[id number]]     Order Specific Question:   Level of Care     Answer:   Med Surg [[id number]]     Order Specific Question:   Estimated length of stay     Answer:   More than 2 Midnights     Order Specific Question:   Certification     Answer:   I certify that inpatient services are medically necessary for this patient for a duration of greater than two midnights  See H&P and MD Progress Notes for additional information about the patient's course of treatment           ED: Date/Time/Mode of Arrival:   ED Arrival Information     Expected Arrival Acuity Means of Arrival Escorted By Service Admission Type    - 1/19/2018 10:36 Emergent Walk-In Family [facility] General Medicine Emergency    Arrival Complaint    chest pain, abdominal pain Chief Complaint:   Chief Complaint   Patient presents with    Chest Pain     Substernal chest pain with SOB, onset this AM    Abdominal Pain     Pt c/o abd pain with nausea, onset this AM       History of Illness:  56 y o  male who presents with severe epigastric pain started today  Patient with heavy alcohol drinking, was admitted here in December due to alcoholic pancreatitis , presented today with severe epigastric pain radiated to the left side of his chest associated with nausea vomiting  Patient admitted drinking alcohol yesterday  He was evaluated in the emergency room, no fever, EKG without acute ischemic changes, normal troponin and normal lipase level  Chest abdomen pelvis CT scan was negative for aortic aneurysm or dissection, positive for increasing fatty infiltration surrounding the descending duodenum concerning for acute duodenitis     Abdominal: Soft  Bowel sounds are normal  He exhibits no distension  There is tenderness  There is rebound     Severe epigastric tenderness with questionable rebound no guarding     ED Vital Signs:   ED Triage Vitals   Temperature Pulse Respirations Blood Pressure SpO2   01/19/18 1041 01/19/18 1041 01/19/18 1041 01/19/18 1041 01/19/18 1041   98 2 °F (36 8 °C) 96 16 (!) 182/108 96 %      Temp Source Heart Rate Source Patient Position - Orthostatic VS BP Location FiO2 (%)   01/19/18 1041 01/19/18 1138 01/19/18 1138 01/19/18 1138 --   Oral Monitor Lying Left arm       Pain Score       01/19/18 1041       9        Wt Readings from Last 1 Encounters:   01/19/18 105 kg (231 lb 0 7 oz)     Abnormal Labs/Diagnostic Test Results:   Na  134  >  135  C;  97  >  100  Chol  243  Lipase  390  >  336  Mg  1 4  Triglycerides  1354  HDL   21  Wbc  11 79  >  10 12    ED Treatment:   Medication Administration from 01/19/2018 1036 to 01/19/2018 2104       Date/Time Order Dose Route Action Action by Comments     01/19/2018 1112 fentanyl citrate (PF) 100 MCG/2ML 50 mcg 50 mcg Intravenous Given [facility]      01/19/2018 1431 sodium chloride 0 9 % bolus 1,000 mL 0 mL Intravenous Stopped [facility]      01/19/2018 1109 sodium chloride 0 9 % bolus 1,000 mL 1,000 mL Intravenous Jonathan [address] Ryan James, [place]      01/19/2018 1110 ondansetron (ZOFRAN) injection 4 mg 4 mg Intravenous Given [facility]      01/19/2018 1137 aspirin chewable tablet 324 mg 324 mg Oral Given [facility]      01/19/2018 1149 morphine (PF) 4 mg/mL injection 4 mg 4 mg Intravenous Given Ryan James, RN      01/19/2018 1148 ondansetron (ZOFRAN) injection 4 mg 4 mg Intravenous Given Ryan Ramirez RN      01/19/2018 1228 iohexol (OMNIPAQUE) 350 MG/ML injection (MULTI-DOSE) 100 mL 100 mL Intravenous Given [facility]      01/19/2018 1430 morphine (PF) 4 mg/mL injection 4 mg 4 mg Intravenous Given [facility]      01/19/2018 1815 sodium chloride 0 9 % bolus 1,000 mL 0 mL Intravenous Stopped [facility]      01/19/2018 1432 sodium chloride 0 9 % bolus 1,000 mL 1,000 mL Intravenous New Bag [facility]      01/19/2018 2037 sodium chloride 0 9 % infusion 125 mL/hr Intravenous New Bag [facility]      01/19/2018 1507 pantoprazole (PROTONIX) injection 40 mg 40 mg Intravenous Given [facility]      01/19/2018 1813 sucralfate (CARAFATE) oral suspension 1,000 mg 1,000 mg Oral Given [facility]      01/19/2018 1511 oxyCODONE (ROXICODONE) IR tablet 5 mg 5 mg Oral Given [facility]      01/19/2018 1809 metoprolol (LOPRESSOR) injection 2 5 mg 2 5 mg Intravenous Given [facility]      01/19/2018 1523 metoprolol (LOPRESSOR) injection 2 5 mg 2 5 mg Intravenous Given [facility]      01/19/2018 1845 LORazepam (ATIVAN) 2 mg/mL injection 1 mg 1 mg Intravenous Given [facility]      73/90/2013 7797 folic acid (FOLVITE) tablet 1 mg 1 mg Oral Given [facility]      01/19/2018 1511 thiamine (VITAMIN B1) tablet 100 mg 100 mg Oral Given Xiomara Mcnally           Past Medical/Surgical History:    Active Ambulatory Problems     Diagnosis Date Noted    Hypertensive urgency     Hyponatremia 12/14/2017    Acute pancreatitis 12/14/2017    Enteritis 12/14/2017    Rectal bleeding 12/15/2017     Resolved Ambulatory Problems     Diagnosis Date Noted    No Resolved Ambulatory Problems     Past Medical History:   Diagnosis Date    Hyperlipidemia     Hypertension     Pancreatitis        Admitting Diagnosis: Pancreatitis [K85 90]  Chest pain [R07 9]  Acute duodenitis [K29 80]    Assessment/Plan:   1  Acute abdominal pain likely secondary to duodenitis/ GERD  Suspect secondary to alcohol,  Normal lipase level, admit patient to the hospital start IV Protonix, Carafate, pain control and supportive care, consult GI for further management  2  Accelerated Hypertension secondary to pain, continue metoprolol and lisinopril  3  Alcohol abuse, watch for delirium tremens and withdrawal symptoms, start folic acid and thiamine ,continue beta-blocker, add Ativan p r n   4   Left-sided chest pain, negative EKG for acute ischemic changes, negative troponin, suspect referred pain secondary to 1   5   Hyperlipidemia, continue statin, check fasting lipid profile  6  Mild COPD on chest CT with right lung nodules, repeat CT scan of the chest in 12 months     VTE Prophylaxis: Enoxaparin (Lovenox)  / sequential compression device     Anticipated Length of Stay:  Patient will be admitted on an Inpatient basis with an anticipated length of stay of  > 2 midnights     Justification for Hospital Stay:  Management of acute duodenitis    Admission Orders:  Scheduled Meds:   atorvastatin 10 mg Oral Daily   enoxaparin 40 mg Subcutaneous Daily   folic acid 1 mg Oral Daily   [START ON 1/21/2018] lisinopril 40 mg Oral Daily   magnesium sulfate 2 g Intravenous Once   metoprolol tartrate 25 mg Oral Q12H [facility]   nicotine 1 patch Transdermal Daily   pantoprazole 40 mg Intravenous Q12H [facility]   sucralfate 1,000 mg Oral Q6H [facility]   thiamine 100 mg Oral Daily         1/19 GI CONSULT  21-year-old male with history of alcohol abuse, hypertension, history of pancreas in the past was present for the emergency room because of severe epigastric pain started this morning  His pain is most in the epigastric area with radiation to the back  CT scan was personally reviewed which showed fatty infiltration surrounding the descending duodenum along the pancreaticoduodenal groove    Abdominal pain-appear to most likely from pancreatitis  Doubt from peptic ulcer disease    Continue nothing by mouth and vigorous IV hydration    Monitor pancreatic enzymes    Advised about alcohol cessation        320  GI  56year-old gentleman with epigastric abdominal pain, history of moderate to heavy alcohol consumption  CT scan with inflammatory changes around his pancreaticoduodenal groove       1   Epigastric abdominal pain:  Most suggestive of acute pancreatitis without complication secondary to alcohol consumption  -discussed importance of alcohol minimization  -could also be peptic ulcer disease, PPI therapy  -advance to low-fat diet  -also need to have tight control of his triglycerides as this may be precipitating recurrent pancreatitis  -if tolerating diet patient can be discharged will need to have an outpatient upper endoscopy   Subjective:   Patient reports that he feels better, decreased pain compared to yesterday  Tolerating clear liquid diet, passing flatus and stools          1/20  INTERNAL MEDICINE  Acute pancreatitis   Assessment & Plan     · CT chest/abdomen/pelvis:           ? Increasing fatty infiltration surrounding the descending duodenum and pancreaticoduodenal groove which may be indicative of duodenitis and/or recurrent pancreatitis  No focal fluid collections or evidence of pancreatic necrosis  · Lipase normal  · Encouraged patient to stop drinking alcohol  · Pancreatitis may also be exacerbated by hypertriglyceridemia  ? Continue Lipitor  · Patient tolerating regular diet      Tobacco dependence   Assessment & Plan     · Encourage cessation  · Continue nicotine patch        Mixed hyperlipidemia   Assessment & Plan     · Continue Lipitor  · Diet modifications        GERD (gastroesophageal reflux disease)   Assessment & Plan     · Continue Protonix and Carafate        Hypertensive urgency   Assessment & Plan     · BPs remain quite elevated despite improved pain  · Increase lisinopril to 40 mg daily  · Continue metoprolol 25 mg twice a day  1/20/2018  98 3   75   16    178/97      SINCE PRESENTATION pt has received:  MS IV 4 mg x2  And zofran IV x2    INTERNAL MEDICINE    Acute pancreatitis   Assessment & Plan     · CT chest/abdomen/pelvis:           ? Increasing fatty infiltration surrounding the descending duodenum and pancreaticoduodenal groove which may be indicative of duodenitis and/or recurrent pancreatitis  No focal fluid collections or evidence of pancreatic necrosis  · Lipase normal  · Encouraged patient to stop drinking alcohol  · Pancreatitis may also be exacerbated by hypertriglyceridemia  ? Continue Lipitor  · Patient tolerating regular diet        Tobacco dependence   Assessment & Plan     · Encourage cessation  · Continue nicotine patch        Mixed hyperlipidemia   Assessment & Plan     · Continue Lipitor  · Diet modifications        GERD (gastroesophageal reflux disease)   Assessment & Plan     · Continue Protonix and Carafate        Hypertensive urgency   Assessment & Plan     · BPs remain quite elevated despite improved pain  · Increase lisinopril to 40 mg daily    · Continue metoprolol 25 mg twice a day

## 2018-01-20 NOTE — ASSESSMENT & PLAN NOTE
· CT chest/abdomen/pelvis:   · Increasing fatty infiltration surrounding the descending duodenum and pancreaticoduodenal groove which may be indicative of duodenitis and/or recurrent pancreatitis  No focal fluid collections or evidence of pancreatic necrosis  · Lipase normal  · Encouraged patient to stop drinking alcohol  · Pancreatitis may also be exacerbated by hypertriglyceridemia  · Continue Lipitor  · Patient tolerating regular diet

## 2018-01-20 NOTE — PROGRESS NOTES
Progress Note - Dariana Germain 1970, 56 y o  male MRN: [id number]  Unit/Bed#: -01 Encounter: [id number]  Primary Care Provider: Karl Valiente MD   Date and time admitted to hospital: 1/19/2018 10:43 AM    * Acute pancreatitis   Assessment & Plan    · CT chest/abdomen/pelvis:   · Increasing fatty infiltration surrounding the descending duodenum and pancreaticoduodenal groove which may be indicative of duodenitis and/or recurrent pancreatitis  No focal fluid collections or evidence of pancreatic necrosis  · Lipase normal  · Encouraged patient to stop drinking alcohol  · Pancreatitis may also be exacerbated by hypertriglyceridemia  · Continue Lipitor  · Patient tolerating regular diet  Tobacco dependence   Assessment & Plan    · Encourage cessation  · Continue nicotine patch  Mixed hyperlipidemia   Assessment & Plan    · Continue Lipitor  · Diet modifications  GERD (gastroesophageal reflux disease)   Assessment & Plan    · Continue Protonix and Carafate  Hypertensive urgency   Assessment & Plan    · BPs remain quite elevated despite improved pain  · Increase lisinopril to 40 mg daily  · Continue metoprolol 25 mg twice a day  VTE Pharmacologic Prophylaxis:   Pharmacologic: Enoxaparin (Lovenox)  Mechanical: Mechanical VTE prophylaxis in place  Patient Centered Rounds: I have performed bedside rounds with nursing staff today  Discussions with Specialists or Other Care Team Provider: None  Education and Discussions with Family / Patient: All patient questions answered to the best of my ability  Time Spent for Care: 20 minutes  More than 50% of total time spent on counseling and coordination of care as described above  Current Length of Stay: 1 day(s)  Current Patient Status: Inpatient   Certification Statement: The patient will continue to require additional inpatient hospital stay due to overnight monitoring      Discharge Plan: Anticipate discharge home   Code Status: Level 1 - Full Code    Subjective:   Patient states his epigastric pain is much improved but not yet resolved  He still has some mild pain  He denies any nausea or vomiting  His diet was advanced and he is tolerating this  Objective:   Vitals:   Temp (24hrs), Av 2 °F (36 8 °C), Min:98 1 °F (36 7 °C), Max:98 3 °F (36 8 °C)    HR:  [71-98] 75  Resp:  [16-18] 16  BP: (154-194)/() 178/97  SpO2:  [93 %-97 %] 95 %  Body mass index is 28 12 kg/m²  Input and Output Summary (last 24 hours): Intake/Output Summary (Last 24 hours) at 18 1350  Last data filed at 18 1300   Gross per 24 hour   Intake          5566 67 ml   Output                0 ml   Net          5566 67 ml       Physical Exam:     Physical Exam   HENT:   Head: Normocephalic and atraumatic  Mouth/Throat: Oropharynx is clear and moist and mucous membranes are normal    Eyes: No scleral icterus  Cardiovascular: Normal rate and regular rhythm  No murmur heard  Pulmonary/Chest: Breath sounds normal  He has no wheezes  He has no rales  He exhibits no tenderness  Abdominal: Soft  Bowel sounds are normal  He exhibits no distension  There is no tenderness  Musculoskeletal: Normal range of motion  He exhibits no edema  Skin: Skin is warm and dry  No rash noted  Psychiatric: He has a normal mood and affect  Vitals reviewed        Additional Data:   Labs:    Results from last 7 days  Lab Units 18  0607 18  1057   WBC Thousand/uL 10 12 11 79*   HEMOGLOBIN g/dL 12 8 14 5   HEMATOCRIT % 39 5 41 8   PLATELETS Thousands/uL 338 389   NEUTROS PCT %  --  78*   LYMPHS PCT %  --  17   MONOS PCT %  --  4   EOS PCT %  --  1       Results from last 7 days  Lab Units 18  0607   SODIUM mmol/L 135*   POTASSIUM mmol/L 3 6   CHLORIDE mmol/L 100   CO2 mmol/L 28   BUN mg/dL 7   CREATININE mg/dL 0 86   CALCIUM mg/dL 8 0*   TOTAL PROTEIN g/dL 6 8   BILIRUBIN TOTAL mg/dL 0 50   ALK PHOS U/L 87   ALT U/L 23   AST U/L 6   GLUCOSE RANDOM mg/dL 104           * I Have Reviewed All Lab Data Listed Above  * Additional Pertinent Lab Tests Reviewed: All Labs Within Last 24 Hours Reviewed    Imaging:    Imaging Reports Reviewed Today Include: None    Cultures:   Blood Culture:   Lab Results   Component Value Date    BLOODCX No Growth After 5 Days  12/14/2017    BLOODCX No Growth After 5 Days  12/14/2017     Urine Culture: No results found for: URINECX  Sputum Culture: No components found for: SPUTUMCX  Wound Culture: No results found for: WOUNDCULT    Last 24 Hours Medication List:     atorvastatin 10 mg Oral Daily   enoxaparin 40 mg Subcutaneous Daily   folic acid 1 mg Oral Daily   lisinopril 20 mg Oral Daily   metoprolol tartrate 25 mg Oral Q12H [address]   nicotine 1 patch Transdermal Daily   pantoprazole 40 mg Intravenous Q12H [address]   sucralfate 1,000 mg Oral Q6H [address]   thiamine 100 mg Oral Daily        Today, Patient Was Seen By: Donte Pratt PA-C    ** Please Note: Dragon 360 Dictation voice to text software may have been used in the creation of this document   **

## 2018-01-20 NOTE — PROGRESS NOTES
[facility]  Progress Note - Nena Mcdermott 56 y o  male MRN: [id number]    Unit/Bed#: -01 Encounter: [id number]    Assessment/Plan:    78-year-old gentleman with epigastric abdominal pain, history of moderate to heavy alcohol consumption  CT scan with inflammatory changes around his pancreaticoduodenal groove  1   Epigastric abdominal pain:  Most suggestive of acute pancreatitis without complication secondary to alcohol consumption  -discussed importance of alcohol minimization  -could also be peptic ulcer disease, PPI therapy  -advance to low-fat diet  -also need to have tight control of his triglycerides as this may be precipitating recurrent pancreatitis  -if tolerating diet patient can be discharged will need to have an outpatient upper endoscopy          Subjective:   Patient reports that he feels better, decreased pain compared to yesterday  Tolerating clear liquid diet, passing flatus and stools  Objective:     Vitals: Blood pressure (!) 178/97, pulse 75, temperature 98 3 °F (36 8 °C), temperature source Oral, resp  rate 16, height 6' 4" (1 93 m), weight 105 kg (231 lb 0 7 oz), SpO2 95 %  ,Body mass index is 28 12 kg/m²        Intake/Output Summary (Last 24 hours) at 01/20/18 1345  Last data filed at 01/20/18 1300   Gross per 24 hour   Intake          5566 67 ml   Output                0 ml   Net          5566 67 ml       Physical Exam:    GEN: wn/wd, NAD  HEENT: MMM, no cervical or supraclavicular LAD, anciteric  CV: RRR, no m/r/g  CHEST: CTA b/l, no w/r/r  ABD: +BS, soft, minimal discomfort with deep palpation, no rebound, no guarding no hepatosplenomegaly  EXT: no c/c/e  SKIN: no rashes  NEURO: aaox3      Invasive Devices     Peripheral Intravenous Line            Peripheral IV 01/19/18 Right Antecubital 1 day                        Lab, Imaging and other studies:     Admission on 01/19/2018   Component Date Value    Ventricular Rate 01/19/2018 90     Atrial Rate 01/19/2018 94     CT Interval 01/19/2018 168     QRSD Interval 01/19/2018 92     QT Interval 01/19/2018 370     QTC Interval 01/19/2018 453     P Axis 01/19/2018 52     QRS Axis 01/19/2018 16     T Wave Axis 01/19/2018 46     Sodium 01/19/2018 134*    Potassium 01/19/2018 3 9     Chloride 01/19/2018 97*    CO2 01/19/2018 26     Anion Gap 01/19/2018 11     BUN 01/19/2018 12     Creatinine 01/19/2018 0 80     Glucose 01/19/2018 132     Calcium 01/19/2018 8 4     AST 01/19/2018 15     ALT 01/19/2018 31     Alkaline Phosphatase 01/19/2018 90     Total Protein 01/19/2018 7 9     Albumin 01/19/2018 3 6     Total Bilirubin 01/19/2018 0 30     eGFR 01/19/2018 107     WBC 01/19/2018 11 79*    RBC 01/19/2018 4 57     Hemoglobin 01/19/2018 14 5     Hematocrit 01/19/2018 41 8     MCV 01/19/2018 92     MCH 01/19/2018 31 7     MCHC 01/19/2018 34 7     RDW 01/19/2018 13 5     MPV 01/19/2018 9 1     Platelets 04/64/4456 389     Neutrophils Relative 01/19/2018 78*    Lymphocytes Relative 01/19/2018 17     Monocytes Relative 01/19/2018 4     Eosinophils Relative 01/19/2018 1     Basophils Relative 01/19/2018 0     Neutrophils Absolute 01/19/2018 9 22*    Lymphocytes Absolute 01/19/2018 1 98     Monocytes Absolute 01/19/2018 0 48     Eosinophils Absolute 01/19/2018 0 06     Basophils Absolute 01/19/2018 0 05     Troponin I 01/19/2018 <0 02     Ventricular Rate 01/19/2018 85     Atrial Rate 01/19/2018 88     CT Interval 01/19/2018 166     QRSD Interval 01/19/2018 90     QT Interval 01/19/2018 380     QTC Interval 01/19/2018 452     P Axis 01/19/2018 56     QRS Axis 01/19/2018 30     T Wave Axis 01/19/2018 52     Lipase 01/19/2018 390     Ventricular Rate 01/19/2018 83     Atrial Rate 01/19/2018 87     CT Interval 01/19/2018 162     QRSD Interval 01/19/2018 94     QT Interval 01/19/2018 400     QTC Interval 01/19/2018 470     P Axis 01/19/2018 56     QRS Axis 01/19/2018 30  T Wave Axis 01/19/2018 49     POC Troponin I 01/19/2018 0 00     Specimen Type 01/19/2018 VENOUS     Troponin I 01/19/2018 <0 02     Sodium 01/20/2018 135*    Potassium 01/20/2018 3 6     Chloride 01/20/2018 100     CO2 01/20/2018 28     Anion Gap 01/20/2018 7     BUN 01/20/2018 7     Creatinine 01/20/2018 0 86     Glucose 01/20/2018 104     Calcium 01/20/2018 8 0*    AST 01/20/2018 6     ALT 01/20/2018 23     Alkaline Phosphatase 01/20/2018 87     Total Protein 01/20/2018 6 8     Albumin 01/20/2018 3 0*    Total Bilirubin 01/20/2018 0 50     eGFR 01/20/2018 104     Magnesium 01/20/2018 1 4*    Phosphorus 01/20/2018 3 5     Cholesterol 01/20/2018 243*    Triglycerides 01/20/2018 1354*    HDL, Direct 01/20/2018 21*    LDL Calculated 01/20/2018      Lipase 01/20/2018 336     WBC 01/20/2018 10 12     RBC 01/20/2018 4 17     Hemoglobin 01/20/2018 12 8     Hematocrit 01/20/2018 39 5     MCV 01/20/2018 95     MCH 01/20/2018 30 7     MCHC 01/20/2018 32 4     RDW 01/20/2018 13 5     Platelets 55/54/8619 338     MPV 01/20/2018 9 5          I have personally reviewed pertinent reports        Current Facility-Administered Medications   Medication Dose Route Frequency    acetaminophen (TYLENOL) tablet 650 mg  650 mg Oral Q6H PRN    atorvastatin (LIPITOR) tablet 10 mg  10 mg Oral Daily    enoxaparin (LOVENOX) subcutaneous injection 40 mg  40 mg Subcutaneous Daily    folic acid (FOLVITE) tablet 1 mg  1 mg Oral Daily    lactated ringers infusion  200 mL/hr Intravenous Continuous    lisinopril (ZESTRIL) tablet 20 mg  20 mg Oral Daily    LORazepam (ATIVAN) 2 mg/mL injection 1 mg  1 mg Intravenous Q6H PRN    metoprolol (LOPRESSOR) injection 2 5 mg  2 5 mg Intravenous Q6H PRN    metoprolol tartrate (LOPRESSOR) tablet 25 mg  25 mg Oral Q12H [place]    morphine injection 2 mg  2 mg Intravenous Q4H PRN    nicotine (NICODERM CQ) 21 mg/24 hr TD 24 hr patch 1 patch  1 patch Transdermal Daily    oxyCODONE (ROXICODONE) IR tablet 5 mg  5 mg Oral Q4H PRN    pantoprazole (PROTONIX) injection 40 mg  40 mg Intravenous Q12H [facility]    sucralfate (CARAFATE) oral suspension 1,000 mg  1,000 mg Oral Q6H [facility]    thiamine (VITAMIN B1) tablet 100 mg  100 mg Oral Daily

## 2018-01-20 NOTE — ASSESSMENT & PLAN NOTE
· BPs remain quite elevated despite improved pain  · Increase lisinopril to 40 mg daily  · Continue metoprolol 25 mg twice a day

## 2018-01-21 VITALS
DIASTOLIC BLOOD PRESSURE: 78 MMHG | SYSTOLIC BLOOD PRESSURE: 142 MMHG | OXYGEN SATURATION: 97 % | WEIGHT: 231.04 LBS | BODY MASS INDEX: 28.13 KG/M2 | HEIGHT: 76 IN | RESPIRATION RATE: 16 BRPM | HEART RATE: 74 BPM | TEMPERATURE: 98.2 F

## 2018-01-21 PROBLEM — K85.90 ACUTE PANCREATITIS: Status: RESOLVED | Noted: 2017-12-14 | Resolved: 2018-01-21

## 2018-01-21 LAB
ALBUMIN SERPL BCP-MCNC: 3 G/DL (ref 3.5–5)
ALP SERPL-CCNC: 93 U/L (ref 46–116)
ALT SERPL W P-5'-P-CCNC: 19 U/L (ref 12–78)
ANION GAP SERPL CALCULATED.3IONS-SCNC: 8 MMOL/L (ref 4–13)
AST SERPL W P-5'-P-CCNC: 14 U/L (ref 5–45)
BASOPHILS # BLD AUTO: 0.04 THOUSANDS/ΜL (ref 0–0.1)
BASOPHILS NFR BLD AUTO: 1 % (ref 0–1)
BILIRUB SERPL-MCNC: 0.5 MG/DL (ref 0.2–1)
BUN SERPL-MCNC: 6 MG/DL (ref 5–25)
CALCIUM SERPL-MCNC: 8.6 MG/DL (ref 8.3–10.1)
CHLORIDE SERPL-SCNC: 101 MMOL/L (ref 100–108)
CO2 SERPL-SCNC: 27 MMOL/L (ref 21–32)
CREAT SERPL-MCNC: 0.88 MG/DL (ref 0.6–1.3)
EOSINOPHIL # BLD AUTO: 0.19 THOUSAND/ΜL (ref 0–0.61)
EOSINOPHIL NFR BLD AUTO: 2 % (ref 0–6)
ERYTHROCYTE [DISTWIDTH] IN BLOOD BY AUTOMATED COUNT: 13.1 % (ref 11.6–15.1)
GFR SERPL CREATININE-BSD FRML MDRD: 103 ML/MIN/1.73SQ M
GLUCOSE SERPL-MCNC: 114 MG/DL (ref 65–140)
HCT VFR BLD AUTO: 37.8 % (ref 36.5–49.3)
HGB BLD-MCNC: 12.4 G/DL (ref 12–17)
LYMPHOCYTES # BLD AUTO: 2.27 THOUSANDS/ΜL (ref 0.6–4.47)
LYMPHOCYTES NFR BLD AUTO: 28 % (ref 14–44)
MCH RBC QN AUTO: 31 PG (ref 26.8–34.3)
MCHC RBC AUTO-ENTMCNC: 32.8 G/DL (ref 31.4–37.4)
MCV RBC AUTO: 95 FL (ref 82–98)
MONOCYTES # BLD AUTO: 0.48 THOUSAND/ΜL (ref 0.17–1.22)
MONOCYTES NFR BLD AUTO: 6 % (ref 4–12)
NEUTROPHILS # BLD AUTO: 5.04 THOUSANDS/ΜL (ref 1.85–7.62)
NEUTS SEG NFR BLD AUTO: 63 % (ref 43–75)
PLATELET # BLD AUTO: 307 THOUSANDS/UL (ref 149–390)
PMV BLD AUTO: 9.5 FL (ref 8.9–12.7)
POTASSIUM SERPL-SCNC: 3.6 MMOL/L (ref 3.5–5.3)
PROT SERPL-MCNC: 7 G/DL (ref 6.4–8.2)
RBC # BLD AUTO: 4 MILLION/UL (ref 3.88–5.62)
SODIUM SERPL-SCNC: 136 MMOL/L (ref 136–145)
WBC # BLD AUTO: 8.02 THOUSAND/UL (ref 4.31–10.16)

## 2018-01-21 PROCEDURE — 85025 COMPLETE CBC W/AUTO DIFF WBC: CPT | Performed by: PHYSICIAN ASSISTANT

## 2018-01-21 PROCEDURE — 80053 COMPREHEN METABOLIC PANEL: CPT | Performed by: PHYSICIAN ASSISTANT

## 2018-01-21 PROCEDURE — C9113 INJ PANTOPRAZOLE SODIUM, VIA: HCPCS | Performed by: INTERNAL MEDICINE

## 2018-01-21 RX ADMIN — SUCRALFATE 1000 MG: 1 SUSPENSION ORAL at 05:20

## 2018-01-21 RX ADMIN — FOLIC ACID 1 MG: 1 TABLET ORAL at 08:18

## 2018-01-21 RX ADMIN — PANTOPRAZOLE SODIUM 40 MG: 40 INJECTION, POWDER, FOR SOLUTION INTRAVENOUS at 08:18

## 2018-01-21 RX ADMIN — SUCRALFATE 1000 MG: 1 SUSPENSION ORAL at 11:34

## 2018-01-21 RX ADMIN — Medication 100 MG: at 08:17

## 2018-01-21 RX ADMIN — METOPROLOL TARTRATE 25 MG: 25 TABLET ORAL at 08:17

## 2018-01-21 RX ADMIN — ENOXAPARIN SODIUM 40 MG: 40 INJECTION SUBCUTANEOUS at 08:18

## 2018-01-21 RX ADMIN — ATORVASTATIN CALCIUM 10 MG: 10 TABLET, FILM COATED ORAL at 08:17

## 2018-01-21 RX ADMIN — LISINOPRIL 40 MG: 20 TABLET ORAL at 08:18

## 2018-01-21 NOTE — DISCHARGE SUMMARY
Discharge- Felton Harris 1970, 56 y o  male MRN: [id number]  Unit/Bed#: -01 Encounter: [id number]  Primary Care Provider: Angie Ruth MD   Date and time admitted to hospital: 1/19/2018 10:43 AM    * Acute pancreatitis-resolved as of 1/21/2018   Assessment & Plan    · CT chest/abdomen/pelvis:   · Increasing fatty infiltration surrounding the descending duodenum and pancreaticoduodenal groove which may be indicative of duodenitis and/or recurrent pancreatitis  No focal fluid collections or evidence of pancreatic necrosis  · Lipase normal  · Encouraged patient to stop drinking alcohol and take his cholesterol medicine regularly  · Pancreatitis may also be exacerbated by hypertriglyceridemia  · Continue Lipitor  · Patient tolerating regular diet  Tobacco dependence   Assessment & Plan    · Encourage cessation  · Continue nicotine patch  Mixed hyperlipidemia   Assessment & Plan    · Continue Lipitor  · Diet modifications  · Start OTC Fish oil        GERD (gastroesophageal reflux disease)   Assessment & Plan    · Continue PPI          Consultations During Hospital Stay:  · GI (Dr David Romeo)    Procedures Performed:   · Chest x-ray (01/19/2018): Bleb or cavitary lesion in the right lung apex  · CTA chest abdomen pelvis with/without contrast (01/19/2018): No evidence of aortic aneurysm or dissection  Increasing fatty infiltration surrounding the descending duodenum and pancreaticoduodenal groove which may be indicative of duodenitis and or recurrent pancreatitis  No fluid collections or evidence of pancreatic necrosis  Mild COPD  No evidence of cavitary lesion  Right apical blebs account for recent chest film finding  Small right lung nodules measuring up to 4 mm  Significant Findings / Test Results:   · See above    Incidental Findings:   · Lung blebs and nodules  · Hypertriglyceridemia:  1354    Test Results Pending at Discharge (will require follow up):    · None Outpatient Tests Requested:  · None    Complications:  None    Reason for Admission:  Epigastric pain    Hospital Course:     Que Thomas is a 56 y o  male patient who originally presented to the hospital on 1/19/2018 due to epigastric pain  Patient has a known history of heavy alcohol use and had been hospitalized in the past for alcoholic pancreatitis  He returns again with similar complaints involving nausea and vomiting  His lipase level was normal   CT scan was performed results of which are seen above  He was placed on IV fluids and seen in consultation by GI  He was also found to have significantly elevated triglyceride levels of greater than 1000  He tells me he is noncompliant with his cholesterol medications  At this time, his pancreatitis may be multifactorial in nature including alcohol induced and secondary to his hypertriglyceridemia  He was strongly encouraged to abstain from further alcohol intake  He was also encouraged to take his cholesterol medication on a regular basis  He is medically stable for discharge at this time  Please see above list of diagnoses and related plan for additional information  Condition at Discharge: stable     Discharge Day Visit / Exam:     Subjective:  Patient states his abdominal pain has completely resolved  He denies any nausea or vomiting  He is tolerating an oral diet  He would like to be discharged today and return to work this evening  Vitals: Blood Pressure: 142/78 (01/21/18 0908)  Pulse: 74 (01/21/18 0908)  Temperature: 98 2 °F (36 8 °C) (01/21/18 0908)  Temp Source: Oral (01/21/18 0908)  Respirations: 16 (01/21/18 0908)  Height: 6' 4" (193 cm) (01/19/18 2108)  Weight - Scale: 105 kg (231 lb 0 7 oz) (01/19/18 2108)  SpO2: 97 % (01/21/18 0908)  Exam:   Physical Exam   HENT:   Head: Normocephalic and atraumatic  Mouth/Throat: Oropharynx is clear and moist and mucous membranes are normal    Eyes: No scleral icterus     Cardiovascular: Normal rate and regular rhythm  No murmur heard  Pulmonary/Chest: Breath sounds normal  He has no wheezes  He has no rales  He exhibits no tenderness  Abdominal: Soft  Bowel sounds are normal  He exhibits no distension  There is no tenderness  Musculoskeletal: Normal range of motion  He exhibits no edema  Skin: Skin is warm and dry  No rash noted  Psychiatric: He has a normal mood and affect  Vitals reviewed  Discussion with Family:  Patient declines    Discharge instructions/Information to patient and family:   See after visit summary for information provided to patient and family  Provisions for Follow-Up Care:  See after visit summary for information related to follow-up care and any pertinent home health orders  Disposition:     Home    For Discharges to [facility] SNF:   · Not Applicable to this Patient - Not Applicable to this Patient    Planned Readmission:  No     Discharge Statement:  I spent 45 minutes discharging the patient  This time was spent on the day of discharge  I had direct contact with the patient on the day of discharge  Greater than 50% of the total time was spent examining patient, answering all patient questions, arranging and discussing plan of care with patient as well as directly providing post-discharge instructions  Additional time then spent on discharge activities  Discharge Medications:  See after visit summary for reconciled discharge medications provided to patient and family        ** Please Note: This note has been constructed using a voice recognition system **

## 2018-01-21 NOTE — ASSESSMENT & PLAN NOTE
· CT chest/abdomen/pelvis:   · Increasing fatty infiltration surrounding the descending duodenum and pancreaticoduodenal groove which may be indicative of duodenitis and/or recurrent pancreatitis  No focal fluid collections or evidence of pancreatic necrosis  · Lipase normal  · Encouraged patient to stop drinking alcohol and take his cholesterol medicine regularly  · Pancreatitis may also be exacerbated by hypertriglyceridemia  · Continue Lipitor  · Patient tolerating regular diet

## 2019-06-10 ENCOUNTER — APPOINTMENT (EMERGENCY)
Dept: CT IMAGING | Facility: HOSPITAL | Age: 49
DRG: 439 | End: 2019-06-10
Payer: COMMERCIAL

## 2019-06-10 ENCOUNTER — HOSPITAL ENCOUNTER (INPATIENT)
Facility: HOSPITAL | Age: 49
LOS: 1 days | Discharge: HOME | DRG: 439 | End: 2019-06-12
Attending: EMERGENCY MEDICINE | Admitting: INTERNAL MEDICINE
Payer: COMMERCIAL

## 2019-06-10 ENCOUNTER — APPOINTMENT (EMERGENCY)
Dept: RADIOLOGY | Facility: HOSPITAL | Age: 49
DRG: 439 | End: 2019-06-10
Payer: COMMERCIAL

## 2019-06-10 DIAGNOSIS — E78.2 MIXED HYPERLIPIDEMIA: ICD-10-CM

## 2019-06-10 DIAGNOSIS — F10.10 ETOH ABUSE: ICD-10-CM

## 2019-06-10 DIAGNOSIS — K85.20 ALCOHOL-INDUCED ACUTE PANCREATITIS, UNSPECIFIED COMPLICATION STATUS: Primary | ICD-10-CM

## 2019-06-10 DIAGNOSIS — K86.2 PANCREATIC CYST: ICD-10-CM

## 2019-06-10 LAB
ALBUMIN SERPL BCP-MCNC: 3.7 G/DL (ref 3.5–5)
ALP SERPL-CCNC: 122 U/L (ref 46–116)
ALT SERPL W P-5'-P-CCNC: 28 U/L (ref 12–78)
ANION GAP SERPL CALCULATED.3IONS-SCNC: 16 MMOL/L (ref 4–13)
AST SERPL W P-5'-P-CCNC: 26 U/L (ref 5–45)
BASOPHILS # BLD AUTO: 0.08 THOUSANDS/ΜL (ref 0–0.1)
BASOPHILS NFR BLD AUTO: 1 % (ref 0–1)
BILIRUB SERPL-MCNC: 0.5 MG/DL (ref 0.2–1)
BUN SERPL-MCNC: 6 MG/DL (ref 5–25)
CALCIUM SERPL-MCNC: 8.5 MG/DL (ref 8.3–10.1)
CHLORIDE SERPL-SCNC: 93 MMOL/L (ref 100–108)
CO2 SERPL-SCNC: 24 MMOL/L (ref 21–32)
CREAT SERPL-MCNC: 0.92 MG/DL (ref 0.6–1.3)
EOSINOPHIL # BLD AUTO: 0.07 THOUSAND/ΜL (ref 0–0.61)
EOSINOPHIL NFR BLD AUTO: 1 % (ref 0–6)
ERYTHROCYTE [DISTWIDTH] IN BLOOD BY AUTOMATED COUNT: 13.1 % (ref 11.6–15.1)
ETHANOL SERPL-MCNC: 83 MG/DL (ref 0–3)
GFR SERPL CREATININE-BSD FRML MDRD: 99 ML/MIN/1.73SQ M
GLUCOSE SERPL-MCNC: 142 MG/DL (ref 65–140)
HCT VFR BLD AUTO: 45.6 % (ref 36.5–49.3)
HGB BLD-MCNC: 16 G/DL (ref 12–17)
IMM GRANULOCYTES # BLD AUTO: 0.06 THOUSAND/UL (ref 0–0.2)
IMM GRANULOCYTES NFR BLD AUTO: 1 % (ref 0–2)
LACTATE SERPL-SCNC: 0.7 MMOL/L (ref 0.5–2)
LIPASE SERPL-CCNC: 1208 U/L (ref 73–393)
LYMPHOCYTES # BLD AUTO: 1.73 THOUSANDS/ΜL (ref 0.6–4.47)
LYMPHOCYTES NFR BLD AUTO: 13 % (ref 14–44)
MCH RBC QN AUTO: 33.2 PG (ref 26.8–34.3)
MCHC RBC AUTO-ENTMCNC: 35.1 G/DL (ref 31.4–37.4)
MCV RBC AUTO: 95 FL (ref 82–98)
MONOCYTES # BLD AUTO: 0.72 THOUSAND/ΜL (ref 0.17–1.22)
MONOCYTES NFR BLD AUTO: 6 % (ref 4–12)
NEUTROPHILS # BLD AUTO: 10.39 THOUSANDS/ΜL (ref 1.85–7.62)
NEUTS SEG NFR BLD AUTO: 78 % (ref 43–75)
NRBC BLD AUTO-RTO: 0 /100 WBCS
PLATELET # BLD AUTO: 244 THOUSANDS/UL (ref 149–390)
PMV BLD AUTO: 9.8 FL (ref 8.9–12.7)
POTASSIUM SERPL-SCNC: 3.2 MMOL/L (ref 3.5–5.3)
PROT SERPL-MCNC: 8.6 G/DL (ref 6.4–8.2)
RBC # BLD AUTO: 4.82 MILLION/UL (ref 3.88–5.62)
SODIUM SERPL-SCNC: 133 MMOL/L (ref 136–145)
TROPONIN I SERPL-MCNC: <0.02 NG/ML
WBC # BLD AUTO: 13.05 THOUSAND/UL (ref 4.31–10.16)

## 2019-06-10 PROCEDURE — 93005 ELECTROCARDIOGRAM TRACING: CPT

## 2019-06-10 PROCEDURE — 84484 ASSAY OF TROPONIN QUANT: CPT | Performed by: EMERGENCY MEDICINE

## 2019-06-10 PROCEDURE — 96361 HYDRATE IV INFUSION ADD-ON: CPT

## 2019-06-10 PROCEDURE — 83690 ASSAY OF LIPASE: CPT | Performed by: EMERGENCY MEDICINE

## 2019-06-10 PROCEDURE — 80320 DRUG SCREEN QUANTALCOHOLS: CPT | Performed by: EMERGENCY MEDICINE

## 2019-06-10 PROCEDURE — 85730 THROMBOPLASTIN TIME PARTIAL: CPT | Performed by: EMERGENCY MEDICINE

## 2019-06-10 PROCEDURE — 99285 EMERGENCY DEPT VISIT HI MDM: CPT

## 2019-06-10 PROCEDURE — 36415 COLL VENOUS BLD VENIPUNCTURE: CPT | Performed by: EMERGENCY MEDICINE

## 2019-06-10 PROCEDURE — 99284 EMERGENCY DEPT VISIT MOD MDM: CPT | Performed by: EMERGENCY MEDICINE

## 2019-06-10 PROCEDURE — 85025 COMPLETE CBC W/AUTO DIFF WBC: CPT | Performed by: EMERGENCY MEDICINE

## 2019-06-10 PROCEDURE — 74177 CT ABD & PELVIS W/CONTRAST: CPT

## 2019-06-10 PROCEDURE — 87040 BLOOD CULTURE FOR BACTERIA: CPT | Performed by: EMERGENCY MEDICINE

## 2019-06-10 PROCEDURE — 71045 X-RAY EXAM CHEST 1 VIEW: CPT

## 2019-06-10 PROCEDURE — 96365 THER/PROPH/DIAG IV INF INIT: CPT

## 2019-06-10 PROCEDURE — 96375 TX/PRO/DX INJ NEW DRUG ADDON: CPT

## 2019-06-10 PROCEDURE — 83735 ASSAY OF MAGNESIUM: CPT | Performed by: PHYSICIAN ASSISTANT

## 2019-06-10 PROCEDURE — C9113 INJ PANTOPRAZOLE SODIUM, VIA: HCPCS | Performed by: EMERGENCY MEDICINE

## 2019-06-10 PROCEDURE — 83605 ASSAY OF LACTIC ACID: CPT | Performed by: EMERGENCY MEDICINE

## 2019-06-10 PROCEDURE — 85610 PROTHROMBIN TIME: CPT | Performed by: EMERGENCY MEDICINE

## 2019-06-10 PROCEDURE — 80053 COMPREHEN METABOLIC PANEL: CPT | Performed by: EMERGENCY MEDICINE

## 2019-06-10 RX ORDER — ONDANSETRON 2 MG/ML
4 INJECTION INTRAMUSCULAR; INTRAVENOUS ONCE
Status: COMPLETED | OUTPATIENT
Start: 2019-06-10 | End: 2019-06-10

## 2019-06-10 RX ORDER — POTASSIUM CHLORIDE 29.8 MG/ML
40 INJECTION INTRAVENOUS ONCE
Status: DISCONTINUED | OUTPATIENT
Start: 2019-06-10 | End: 2019-06-10 | Stop reason: SDUPTHER

## 2019-06-10 RX ORDER — PANTOPRAZOLE SODIUM 40 MG/1
40 INJECTION, POWDER, FOR SOLUTION INTRAVENOUS ONCE
Status: COMPLETED | OUTPATIENT
Start: 2019-06-10 | End: 2019-06-10

## 2019-06-10 RX ORDER — MORPHINE SULFATE 4 MG/ML
4 INJECTION, SOLUTION INTRAMUSCULAR; INTRAVENOUS ONCE
Status: COMPLETED | OUTPATIENT
Start: 2019-06-10 | End: 2019-06-10

## 2019-06-10 RX ORDER — THIAMINE MONONITRATE (VIT B1) 100 MG
100 TABLET ORAL ONCE
Status: DISCONTINUED | OUTPATIENT
Start: 2019-06-10 | End: 2019-06-10

## 2019-06-10 RX ORDER — KETOROLAC TROMETHAMINE 30 MG/ML
15 INJECTION, SOLUTION INTRAMUSCULAR; INTRAVENOUS ONCE
Status: COMPLETED | OUTPATIENT
Start: 2019-06-10 | End: 2019-06-10

## 2019-06-10 RX ORDER — LORAZEPAM 2 MG/ML
1 INJECTION INTRAMUSCULAR ONCE
Status: COMPLETED | OUTPATIENT
Start: 2019-06-10 | End: 2019-06-10

## 2019-06-10 RX ORDER — POTASSIUM CHLORIDE 14.9 MG/ML
20 INJECTION INTRAVENOUS ONCE
Status: COMPLETED | OUTPATIENT
Start: 2019-06-11 | End: 2019-06-11

## 2019-06-10 RX ORDER — POTASSIUM CHLORIDE 14.9 MG/ML
20 INJECTION INTRAVENOUS ONCE
Status: COMPLETED | OUTPATIENT
Start: 2019-06-10 | End: 2019-06-11

## 2019-06-10 RX ADMIN — MORPHINE SULFATE 4 MG: 4 INJECTION INTRAVENOUS at 23:57

## 2019-06-10 RX ADMIN — ONDANSETRON 4 MG: 2 INJECTION INTRAMUSCULAR; INTRAVENOUS at 22:43

## 2019-06-10 RX ADMIN — FOLIC ACID: 5 INJECTION, SOLUTION INTRAMUSCULAR; INTRAVENOUS; SUBCUTANEOUS at 22:59

## 2019-06-10 RX ADMIN — PANTOPRAZOLE SODIUM 40 MG: 40 INJECTION, POWDER, FOR SOLUTION INTRAVENOUS at 23:52

## 2019-06-10 RX ADMIN — KETOROLAC TROMETHAMINE 15 MG: 30 INJECTION, SOLUTION INTRAMUSCULAR at 22:43

## 2019-06-10 RX ADMIN — SODIUM CHLORIDE 1000 ML: 0.9 INJECTION, SOLUTION INTRAVENOUS at 22:40

## 2019-06-10 RX ADMIN — LORAZEPAM 1 MG: 2 INJECTION INTRAMUSCULAR; INTRAVENOUS at 22:44

## 2019-06-10 RX ADMIN — IOHEXOL 100 ML: 350 INJECTION, SOLUTION INTRAVENOUS at 23:28

## 2019-06-11 ENCOUNTER — APPOINTMENT (INPATIENT)
Dept: RADIOLOGY | Facility: HOSPITAL | Age: 49
DRG: 439 | End: 2019-06-11
Payer: COMMERCIAL

## 2019-06-11 ENCOUNTER — APPOINTMENT (INPATIENT)
Dept: MRI IMAGING | Facility: HOSPITAL | Age: 49
DRG: 439 | End: 2019-06-11
Payer: COMMERCIAL

## 2019-06-11 PROBLEM — E87.6 HYPOKALEMIA: Status: ACTIVE | Noted: 2019-06-11

## 2019-06-11 PROBLEM — A41.9 SEPSIS (HCC): Status: ACTIVE | Noted: 2019-06-11

## 2019-06-11 PROBLEM — R10.9 ABDOMINAL PAIN: Status: ACTIVE | Noted: 2019-06-11

## 2019-06-11 PROBLEM — F10.10 ETOH ABUSE: Status: ACTIVE | Noted: 2019-06-11

## 2019-06-11 PROBLEM — K86.2 PANCREATIC CYST: Status: ACTIVE | Noted: 2019-06-11

## 2019-06-11 LAB
ALBUMIN SERPL BCP-MCNC: 3.2 G/DL (ref 3.5–5)
ALP SERPL-CCNC: 99 U/L (ref 46–116)
ALT SERPL W P-5'-P-CCNC: 23 U/L (ref 12–78)
AMPHETAMINES SERPL QL SCN: NEGATIVE
ANION GAP SERPL CALCULATED.3IONS-SCNC: 12 MMOL/L (ref 4–13)
APTT PPP: 32 SECONDS (ref 26–38)
AST SERPL W P-5'-P-CCNC: 16 U/L (ref 5–45)
BACTERIA UR QL AUTO: ABNORMAL /HPF
BARBITURATES UR QL: NEGATIVE
BASOPHILS # BLD AUTO: 0.05 THOUSANDS/ΜL (ref 0–0.1)
BASOPHILS NFR BLD AUTO: 1 % (ref 0–1)
BENZODIAZ UR QL: NEGATIVE
BILIRUB SERPL-MCNC: 0.8 MG/DL (ref 0.2–1)
BILIRUB UR QL STRIP: NEGATIVE
BUN SERPL-MCNC: 7 MG/DL (ref 5–25)
CALCIUM SERPL-MCNC: 7.7 MG/DL (ref 8.3–10.1)
CHLORIDE SERPL-SCNC: 97 MMOL/L (ref 100–108)
CLARITY UR: CLEAR
CO2 SERPL-SCNC: 24 MMOL/L (ref 21–32)
COCAINE UR QL: NEGATIVE
COLOR UR: YELLOW
CREAT SERPL-MCNC: 0.78 MG/DL (ref 0.6–1.3)
EOSINOPHIL # BLD AUTO: 0.08 THOUSAND/ΜL (ref 0–0.61)
EOSINOPHIL NFR BLD AUTO: 1 % (ref 0–6)
ERYTHROCYTE [DISTWIDTH] IN BLOOD BY AUTOMATED COUNT: 13.4 % (ref 11.6–15.1)
ETHANOL SERPL-MCNC: <3 MG/DL (ref 0–3)
GFR SERPL CREATININE-BSD FRML MDRD: 107 ML/MIN/1.73SQ M
GLUCOSE SERPL-MCNC: 118 MG/DL (ref 65–140)
GLUCOSE UR STRIP-MCNC: NEGATIVE MG/DL
HCT VFR BLD AUTO: 40.8 % (ref 36.5–49.3)
HGB BLD-MCNC: 13.9 G/DL (ref 12–17)
HGB UR QL STRIP.AUTO: ABNORMAL
HYALINE CASTS #/AREA URNS LPF: ABNORMAL /LPF
IMM GRANULOCYTES # BLD AUTO: 0.03 THOUSAND/UL (ref 0–0.2)
IMM GRANULOCYTES NFR BLD AUTO: 0 % (ref 0–2)
INR PPP: 0.95 (ref 0.86–1.17)
KETONES UR STRIP-MCNC: NEGATIVE MG/DL
LEUKOCYTE ESTERASE UR QL STRIP: NEGATIVE
LIPASE SERPL-CCNC: 863 U/L (ref 73–393)
LYMPHOCYTES # BLD AUTO: 2.05 THOUSANDS/ΜL (ref 0.6–4.47)
LYMPHOCYTES NFR BLD AUTO: 19 % (ref 14–44)
MAGNESIUM SERPL-MCNC: 1.1 MG/DL (ref 1.6–2.6)
MAGNESIUM SERPL-MCNC: 1.3 MG/DL (ref 1.6–2.6)
MCH RBC QN AUTO: 32.9 PG (ref 26.8–34.3)
MCHC RBC AUTO-ENTMCNC: 34.1 G/DL (ref 31.4–37.4)
MCV RBC AUTO: 97 FL (ref 82–98)
METHADONE UR QL: NEGATIVE
MONOCYTES # BLD AUTO: 0.79 THOUSAND/ΜL (ref 0.17–1.22)
MONOCYTES NFR BLD AUTO: 8 % (ref 4–12)
MUCOUS THREADS UR QL AUTO: ABNORMAL
NEUTROPHILS # BLD AUTO: 7.55 THOUSANDS/ΜL (ref 1.85–7.62)
NEUTS SEG NFR BLD AUTO: 71 % (ref 43–75)
NITRITE UR QL STRIP: NEGATIVE
NON-SQ EPI CELLS URNS QL MICRO: ABNORMAL /HPF
NRBC BLD AUTO-RTO: 0 /100 WBCS
OPIATES UR QL SCN: POSITIVE
PCP UR QL: NEGATIVE
PH UR STRIP.AUTO: 6.5 [PH]
PLATELET # BLD AUTO: 195 THOUSANDS/UL (ref 149–390)
PMV BLD AUTO: 10.1 FL (ref 8.9–12.7)
POTASSIUM SERPL-SCNC: 3.4 MMOL/L (ref 3.5–5.3)
PROT SERPL-MCNC: 7.3 G/DL (ref 6.4–8.2)
PROT UR STRIP-MCNC: ABNORMAL MG/DL
PROTHROMBIN TIME: 12.4 SECONDS (ref 11.8–14.2)
RBC # BLD AUTO: 4.23 MILLION/UL (ref 3.88–5.62)
RBC #/AREA URNS AUTO: ABNORMAL /HPF
SODIUM SERPL-SCNC: 133 MMOL/L (ref 136–145)
SP GR UR STRIP.AUTO: <=1.005 (ref 1–1.03)
THC UR QL: NEGATIVE
TRIGL SERPL-MCNC: 1084 MG/DL
UROBILINOGEN UR QL STRIP.AUTO: 0.2 E.U./DL
WBC # BLD AUTO: 10.55 THOUSAND/UL (ref 4.31–10.16)
WBC #/AREA URNS AUTO: ABNORMAL /HPF

## 2019-06-11 PROCEDURE — 99222 1ST HOSP IP/OBS MODERATE 55: CPT | Performed by: PHYSICIAN ASSISTANT

## 2019-06-11 PROCEDURE — 94762 N-INVAS EAR/PLS OXIMTRY CONT: CPT

## 2019-06-11 PROCEDURE — 80307 DRUG TEST PRSMV CHEM ANLYZR: CPT | Performed by: EMERGENCY MEDICINE

## 2019-06-11 PROCEDURE — 80053 COMPREHEN METABOLIC PANEL: CPT | Performed by: PHYSICIAN ASSISTANT

## 2019-06-11 PROCEDURE — 94760 N-INVAS EAR/PLS OXIMETRY 1: CPT

## 2019-06-11 PROCEDURE — 74183 MRI ABD W/O CNTR FLWD CNTR: CPT

## 2019-06-11 PROCEDURE — 84478 ASSAY OF TRIGLYCERIDES: CPT | Performed by: PHYSICIAN ASSISTANT

## 2019-06-11 PROCEDURE — 83735 ASSAY OF MAGNESIUM: CPT | Performed by: PHYSICIAN ASSISTANT

## 2019-06-11 PROCEDURE — A9585 GADOBUTROL INJECTION: HCPCS | Performed by: PHYSICIAN ASSISTANT

## 2019-06-11 PROCEDURE — 83690 ASSAY OF LIPASE: CPT | Performed by: PHYSICIAN ASSISTANT

## 2019-06-11 PROCEDURE — 81001 URINALYSIS AUTO W/SCOPE: CPT | Performed by: EMERGENCY MEDICINE

## 2019-06-11 PROCEDURE — 87040 BLOOD CULTURE FOR BACTERIA: CPT | Performed by: EMERGENCY MEDICINE

## 2019-06-11 PROCEDURE — 85025 COMPLETE CBC W/AUTO DIFF WBC: CPT | Performed by: PHYSICIAN ASSISTANT

## 2019-06-11 PROCEDURE — 36415 COLL VENOUS BLD VENIPUNCTURE: CPT | Performed by: EMERGENCY MEDICINE

## 2019-06-11 PROCEDURE — 80320 DRUG SCREEN QUANTALCOHOLS: CPT | Performed by: PHYSICIAN ASSISTANT

## 2019-06-11 PROCEDURE — 99223 1ST HOSP IP/OBS HIGH 75: CPT | Performed by: PHYSICIAN ASSISTANT

## 2019-06-11 RX ORDER — MAGNESIUM SULFATE HEPTAHYDRATE 40 MG/ML
4 INJECTION, SOLUTION INTRAVENOUS ONCE
Status: COMPLETED | OUTPATIENT
Start: 2019-06-11 | End: 2019-06-11

## 2019-06-11 RX ORDER — HYDROMORPHONE HCL/PF 1 MG/ML
0.5 SYRINGE (ML) INJECTION
Status: DISCONTINUED | OUTPATIENT
Start: 2019-06-11 | End: 2019-06-11

## 2019-06-11 RX ORDER — LISINOPRIL 20 MG/1
20 TABLET ORAL DAILY
Status: DISCONTINUED | OUTPATIENT
Start: 2019-06-11 | End: 2019-06-12 | Stop reason: HOSPADM

## 2019-06-11 RX ORDER — ATORVASTATIN CALCIUM 10 MG/1
10 TABLET, FILM COATED ORAL
Status: DISCONTINUED | OUTPATIENT
Start: 2019-06-11 | End: 2019-06-12 | Stop reason: HOSPADM

## 2019-06-11 RX ORDER — PROMETHAZINE HYDROCHLORIDE 25 MG/ML
12.5 INJECTION, SOLUTION INTRAMUSCULAR; INTRAVENOUS EVERY 6 HOURS PRN
Status: DISCONTINUED | OUTPATIENT
Start: 2019-06-11 | End: 2019-06-12 | Stop reason: HOSPADM

## 2019-06-11 RX ORDER — ACETAMINOPHEN 325 MG/1
650 TABLET ORAL EVERY 6 HOURS PRN
Status: DISCONTINUED | OUTPATIENT
Start: 2019-06-11 | End: 2019-06-12 | Stop reason: HOSPADM

## 2019-06-11 RX ORDER — LORAZEPAM 2 MG/ML
2 INJECTION INTRAMUSCULAR EVERY 4 HOURS PRN
Status: DISCONTINUED | OUTPATIENT
Start: 2019-06-11 | End: 2019-06-12 | Stop reason: HOSPADM

## 2019-06-11 RX ORDER — SODIUM CHLORIDE 9 MG/ML
125 INJECTION, SOLUTION INTRAVENOUS CONTINUOUS
Status: DISCONTINUED | OUTPATIENT
Start: 2019-06-11 | End: 2019-06-11

## 2019-06-11 RX ORDER — POTASSIUM CHLORIDE 20 MEQ/1
40 TABLET, EXTENDED RELEASE ORAL ONCE
Status: COMPLETED | OUTPATIENT
Start: 2019-06-11 | End: 2019-06-11

## 2019-06-11 RX ORDER — HYDROMORPHONE HCL/PF 1 MG/ML
0.5 SYRINGE (ML) INJECTION
Status: DISCONTINUED | OUTPATIENT
Start: 2019-06-11 | End: 2019-06-12 | Stop reason: HOSPADM

## 2019-06-11 RX ORDER — CALCIUM CARBONATE 200(500)MG
1000 TABLET,CHEWABLE ORAL DAILY PRN
Status: DISCONTINUED | OUTPATIENT
Start: 2019-06-11 | End: 2019-06-12 | Stop reason: HOSPADM

## 2019-06-11 RX ORDER — ONDANSETRON 2 MG/ML
4 INJECTION INTRAMUSCULAR; INTRAVENOUS EVERY 6 HOURS PRN
Status: DISCONTINUED | OUTPATIENT
Start: 2019-06-11 | End: 2019-06-11

## 2019-06-11 RX ORDER — SODIUM CHLORIDE, SODIUM LACTATE, POTASSIUM CHLORIDE, CALCIUM CHLORIDE 600; 310; 30; 20 MG/100ML; MG/100ML; MG/100ML; MG/100ML
200 INJECTION, SOLUTION INTRAVENOUS CONTINUOUS
Status: DISCONTINUED | OUTPATIENT
Start: 2019-06-11 | End: 2019-06-12 | Stop reason: HOSPADM

## 2019-06-11 RX ORDER — OXYCODONE HYDROCHLORIDE 5 MG/1
5 TABLET ORAL EVERY 4 HOURS PRN
Status: DISCONTINUED | OUTPATIENT
Start: 2019-06-11 | End: 2019-06-12 | Stop reason: HOSPADM

## 2019-06-11 RX ORDER — HYDRALAZINE HYDROCHLORIDE 20 MG/ML
5 INJECTION INTRAMUSCULAR; INTRAVENOUS EVERY 6 HOURS PRN
Status: DISCONTINUED | OUTPATIENT
Start: 2019-06-11 | End: 2019-06-12 | Stop reason: HOSPADM

## 2019-06-11 RX ORDER — OXYCODONE HYDROCHLORIDE 10 MG/1
10 TABLET ORAL EVERY 4 HOURS PRN
Status: DISCONTINUED | OUTPATIENT
Start: 2019-06-11 | End: 2019-06-12 | Stop reason: HOSPADM

## 2019-06-11 RX ORDER — ONDANSETRON 2 MG/ML
4 INJECTION INTRAMUSCULAR; INTRAVENOUS EVERY 6 HOURS
Status: DISCONTINUED | OUTPATIENT
Start: 2019-06-11 | End: 2019-06-12 | Stop reason: HOSPADM

## 2019-06-11 RX ORDER — PANTOPRAZOLE SODIUM 40 MG/1
40 TABLET, DELAYED RELEASE ORAL
Status: DISCONTINUED | OUTPATIENT
Start: 2019-06-11 | End: 2019-06-12 | Stop reason: HOSPADM

## 2019-06-11 RX ADMIN — FOLIC ACID: 5 INJECTION, SOLUTION INTRAMUSCULAR; INTRAVENOUS; SUBCUTANEOUS at 08:34

## 2019-06-11 RX ADMIN — OXYCODONE HYDROCHLORIDE 10 MG: 10 TABLET ORAL at 10:24

## 2019-06-11 RX ADMIN — LORAZEPAM 2 MG: 2 INJECTION INTRAMUSCULAR; INTRAVENOUS at 21:29

## 2019-06-11 RX ADMIN — MAGNESIUM SULFATE HEPTAHYDRATE 4 G: 40 INJECTION, SOLUTION INTRAVENOUS at 14:19

## 2019-06-11 RX ADMIN — METOPROLOL TARTRATE 25 MG: 25 TABLET, FILM COATED ORAL at 01:27

## 2019-06-11 RX ADMIN — PIPERACILLIN SODIUM AND TAZOBACTAM SODIUM 3.38 G: 36; 4.5 INJECTION, POWDER, FOR SOLUTION INTRAVENOUS at 11:52

## 2019-06-11 RX ADMIN — PIPERACILLIN SODIUM AND TAZOBACTAM SODIUM 3.38 G: 36; 4.5 INJECTION, POWDER, FOR SOLUTION INTRAVENOUS at 05:23

## 2019-06-11 RX ADMIN — OXYCODONE HYDROCHLORIDE 5 MG: 5 TABLET ORAL at 15:47

## 2019-06-11 RX ADMIN — PIPERACILLIN SODIUM AND TAZOBACTAM SODIUM 3.38 G: 36; 4.5 INJECTION, POWDER, FOR SOLUTION INTRAVENOUS at 23:44

## 2019-06-11 RX ADMIN — OXYCODONE HYDROCHLORIDE 10 MG: 10 TABLET ORAL at 18:58

## 2019-06-11 RX ADMIN — GADOBUTROL 10 ML: 604.72 INJECTION INTRAVENOUS at 15:55

## 2019-06-11 RX ADMIN — SODIUM CHLORIDE, SODIUM LACTATE, POTASSIUM CHLORIDE, AND CALCIUM CHLORIDE 200 ML/HR: .6; .31; .03; .02 INJECTION, SOLUTION INTRAVENOUS at 11:52

## 2019-06-11 RX ADMIN — POTASSIUM CHLORIDE 20 MEQ: 14.9 INJECTION, SOLUTION INTRAVENOUS at 00:04

## 2019-06-11 RX ADMIN — POTASSIUM CHLORIDE 20 MEQ: 200 INJECTION, SOLUTION INTRAVENOUS at 02:57

## 2019-06-11 RX ADMIN — LISINOPRIL 20 MG: 20 TABLET ORAL at 08:33

## 2019-06-11 RX ADMIN — CALCIUM CARBONATE (ANTACID) CHEW TAB 500 MG 1000 MG: 500 CHEW TAB at 04:37

## 2019-06-11 RX ADMIN — OXYCODONE HYDROCHLORIDE 5 MG: 5 TABLET ORAL at 19:56

## 2019-06-11 RX ADMIN — HYDROMORPHONE HYDROCHLORIDE 0.5 MG: 1 INJECTION, SOLUTION INTRAMUSCULAR; INTRAVENOUS; SUBCUTANEOUS at 08:33

## 2019-06-11 RX ADMIN — PIPERACILLIN SODIUM,TAZOBACTAM SODIUM 3.38 G: 3; .375 INJECTION, POWDER, FOR SOLUTION INTRAVENOUS at 00:40

## 2019-06-11 RX ADMIN — OXYCODONE HYDROCHLORIDE 5 MG: 5 TABLET ORAL at 03:15

## 2019-06-11 RX ADMIN — OXYCODONE HYDROCHLORIDE 10 MG: 10 TABLET ORAL at 05:23

## 2019-06-11 RX ADMIN — PROMETHAZINE HYDROCHLORIDE 12.5 MG: 25 INJECTION INTRAMUSCULAR; INTRAVENOUS at 14:24

## 2019-06-11 RX ADMIN — ATORVASTATIN CALCIUM 10 MG: 10 TABLET, FILM COATED ORAL at 15:47

## 2019-06-11 RX ADMIN — ACETAMINOPHEN 650 MG: 325 TABLET, FILM COATED ORAL at 01:27

## 2019-06-11 RX ADMIN — ONDANSETRON 4 MG: 2 INJECTION INTRAMUSCULAR; INTRAVENOUS at 15:47

## 2019-06-11 RX ADMIN — HYDRALAZINE HYDROCHLORIDE 5 MG: 20 INJECTION INTRAMUSCULAR; INTRAVENOUS at 05:30

## 2019-06-11 RX ADMIN — POTASSIUM CHLORIDE 40 MEQ: 1500 TABLET, EXTENDED RELEASE ORAL at 14:19

## 2019-06-11 RX ADMIN — HYDROMORPHONE HYDROCHLORIDE 0.5 MG: 1 INJECTION, SOLUTION INTRAMUSCULAR; INTRAVENOUS; SUBCUTANEOUS at 17:09

## 2019-06-11 RX ADMIN — SODIUM CHLORIDE 125 ML/HR: 0.9 INJECTION, SOLUTION INTRAVENOUS at 01:33

## 2019-06-11 RX ADMIN — SODIUM CHLORIDE, SODIUM LACTATE, POTASSIUM CHLORIDE, AND CALCIUM CHLORIDE 200 ML/HR: .6; .31; .03; .02 INJECTION, SOLUTION INTRAVENOUS at 19:54

## 2019-06-11 RX ADMIN — PIPERACILLIN SODIUM AND TAZOBACTAM SODIUM 3.38 G: 36; 4.5 INJECTION, POWDER, FOR SOLUTION INTRAVENOUS at 17:04

## 2019-06-11 RX ADMIN — METOPROLOL TARTRATE 25 MG: 25 TABLET, FILM COATED ORAL at 08:33

## 2019-06-11 RX ADMIN — METOPROLOL TARTRATE 25 MG: 25 TABLET, FILM COATED ORAL at 21:25

## 2019-06-11 RX ADMIN — PANTOPRAZOLE SODIUM 40 MG: 40 TABLET, DELAYED RELEASE ORAL at 05:24

## 2019-06-11 RX ADMIN — ONDANSETRON 4 MG: 2 INJECTION INTRAMUSCULAR; INTRAVENOUS at 21:25

## 2019-06-11 RX ADMIN — ONDANSETRON 4 MG: 2 INJECTION INTRAMUSCULAR; INTRAVENOUS at 10:24

## 2019-06-11 RX ADMIN — OXYCODONE HYDROCHLORIDE 10 MG: 10 TABLET ORAL at 14:24

## 2019-06-11 RX ADMIN — ENOXAPARIN SODIUM 40 MG: 40 INJECTION SUBCUTANEOUS at 08:33

## 2019-06-12 VITALS
RESPIRATION RATE: 18 BRPM | BODY MASS INDEX: 27.45 KG/M2 | HEIGHT: 76 IN | OXYGEN SATURATION: 96 % | DIASTOLIC BLOOD PRESSURE: 89 MMHG | TEMPERATURE: 98.4 F | WEIGHT: 225.4 LBS | HEART RATE: 81 BPM | SYSTOLIC BLOOD PRESSURE: 152 MMHG

## 2019-06-12 LAB
ALBUMIN SERPL BCP-MCNC: 2.7 G/DL (ref 3.5–5)
ALP SERPL-CCNC: 82 U/L (ref 46–116)
ALT SERPL W P-5'-P-CCNC: 17 U/L (ref 12–78)
ANION GAP SERPL CALCULATED.3IONS-SCNC: 8 MMOL/L (ref 4–13)
AST SERPL W P-5'-P-CCNC: 17 U/L (ref 5–45)
ATRIAL RATE: 111 BPM
ATRIAL RATE: 187 BPM
BILIRUB SERPL-MCNC: 0.8 MG/DL (ref 0.2–1)
BUN SERPL-MCNC: 4 MG/DL (ref 5–25)
CALCIUM SERPL-MCNC: 8 MG/DL (ref 8.3–10.1)
CHLORIDE SERPL-SCNC: 100 MMOL/L (ref 100–108)
CO2 SERPL-SCNC: 27 MMOL/L (ref 21–32)
CREAT SERPL-MCNC: 0.76 MG/DL (ref 0.6–1.3)
ERYTHROCYTE [DISTWIDTH] IN BLOOD BY AUTOMATED COUNT: 13.4 % (ref 11.6–15.1)
GFR SERPL CREATININE-BSD FRML MDRD: 109 ML/MIN/1.73SQ M
GLUCOSE SERPL-MCNC: 105 MG/DL (ref 65–140)
HCT VFR BLD AUTO: 37.6 % (ref 36.5–49.3)
HGB BLD-MCNC: 12.5 G/DL (ref 12–17)
LIPASE SERPL-CCNC: 733 U/L (ref 73–393)
MAGNESIUM SERPL-MCNC: 1.9 MG/DL (ref 1.6–2.6)
MCH RBC QN AUTO: 32.6 PG (ref 26.8–34.3)
MCHC RBC AUTO-ENTMCNC: 33.2 G/DL (ref 31.4–37.4)
MCV RBC AUTO: 98 FL (ref 82–98)
P AXIS: 65 DEGREES
PLATELET # BLD AUTO: 178 THOUSANDS/UL (ref 149–390)
PMV BLD AUTO: 10.1 FL (ref 8.9–12.7)
POTASSIUM SERPL-SCNC: 3.4 MMOL/L (ref 3.5–5.3)
PR INTERVAL: 154 MS
PROT SERPL-MCNC: 6.5 G/DL (ref 6.4–8.2)
QRS AXIS: 44 DEGREES
QRS AXIS: 46 DEGREES
QRSD INTERVAL: 86 MS
QRSD INTERVAL: 88 MS
QT INTERVAL: 352 MS
QT INTERVAL: 354 MS
QTC INTERVAL: 471 MS
QTC INTERVAL: 481 MS
RBC # BLD AUTO: 3.83 MILLION/UL (ref 3.88–5.62)
SODIUM SERPL-SCNC: 135 MMOL/L (ref 136–145)
T WAVE AXIS: 45 DEGREES
T WAVE AXIS: 54 DEGREES
VENTRICULAR RATE: 108 BPM
VENTRICULAR RATE: 111 BPM
WBC # BLD AUTO: 8.19 THOUSAND/UL (ref 4.31–10.16)

## 2019-06-12 PROCEDURE — 83690 ASSAY OF LIPASE: CPT | Performed by: HOSPITALIST

## 2019-06-12 PROCEDURE — 99232 SBSQ HOSP IP/OBS MODERATE 35: CPT | Performed by: HOSPITALIST

## 2019-06-12 PROCEDURE — 99232 SBSQ HOSP IP/OBS MODERATE 35: CPT | Performed by: INTERNAL MEDICINE

## 2019-06-12 PROCEDURE — 85027 COMPLETE CBC AUTOMATED: CPT | Performed by: HOSPITALIST

## 2019-06-12 PROCEDURE — 93010 ELECTROCARDIOGRAM REPORT: CPT | Performed by: INTERNAL MEDICINE

## 2019-06-12 PROCEDURE — 83735 ASSAY OF MAGNESIUM: CPT | Performed by: HOSPITALIST

## 2019-06-12 PROCEDURE — 80053 COMPREHEN METABOLIC PANEL: CPT | Performed by: HOSPITALIST

## 2019-06-12 RX ORDER — FOLIC ACID 1 MG/1
1 TABLET ORAL DAILY
Qty: 30 TABLET | Refills: 0 | Status: SHIPPED | OUTPATIENT
Start: 2019-06-12

## 2019-06-12 RX ORDER — FENOFIBRATE 145 MG/1
145 TABLET, COATED ORAL DAILY
Qty: 30 TABLET | Refills: 0 | Status: SHIPPED | OUTPATIENT
Start: 2019-06-13

## 2019-06-12 RX ORDER — POTASSIUM CHLORIDE 20 MEQ/1
40 TABLET, EXTENDED RELEASE ORAL ONCE
Status: COMPLETED | OUTPATIENT
Start: 2019-06-12 | End: 2019-06-12

## 2019-06-12 RX ORDER — FOLIC ACID 1 MG/1
1 TABLET ORAL DAILY
Qty: 30 TABLET | Refills: 0 | Status: SHIPPED | OUTPATIENT
Start: 2019-06-12 | End: 2019-06-12 | Stop reason: SDUPTHER

## 2019-06-12 RX ORDER — MAGNESIUM SULFATE HEPTAHYDRATE 40 MG/ML
2 INJECTION, SOLUTION INTRAVENOUS ONCE
Status: COMPLETED | OUTPATIENT
Start: 2019-06-12 | End: 2019-06-12

## 2019-06-12 RX ORDER — LANOLIN ALCOHOL/MO/W.PET/CERES
100 CREAM (GRAM) TOPICAL DAILY
Qty: 30 TABLET | Refills: 0 | Status: SHIPPED | OUTPATIENT
Start: 2019-06-12

## 2019-06-12 RX ORDER — FENOFIBRATE 145 MG/1
145 TABLET, COATED ORAL DAILY
Status: DISCONTINUED | OUTPATIENT
Start: 2019-06-12 | End: 2019-06-12 | Stop reason: HOSPADM

## 2019-06-12 RX ADMIN — PANTOPRAZOLE SODIUM 40 MG: 40 TABLET, DELAYED RELEASE ORAL at 05:30

## 2019-06-12 RX ADMIN — SODIUM CHLORIDE, SODIUM LACTATE, POTASSIUM CHLORIDE, AND CALCIUM CHLORIDE 200 ML/HR: .6; .31; .03; .02 INJECTION, SOLUTION INTRAVENOUS at 02:11

## 2019-06-12 RX ADMIN — LISINOPRIL 20 MG: 20 TABLET ORAL at 09:28

## 2019-06-12 RX ADMIN — FOLIC ACID: 5 INJECTION, SOLUTION INTRAMUSCULAR; INTRAVENOUS; SUBCUTANEOUS at 10:32

## 2019-06-12 RX ADMIN — FENOFIBRATE 145 MG: 145 TABLET, COATED ORAL at 11:52

## 2019-06-12 RX ADMIN — SODIUM CHLORIDE, SODIUM LACTATE, POTASSIUM CHLORIDE, AND CALCIUM CHLORIDE 200 ML/HR: .6; .31; .03; .02 INJECTION, SOLUTION INTRAVENOUS at 14:09

## 2019-06-12 RX ADMIN — METOPROLOL TARTRATE 25 MG: 25 TABLET, FILM COATED ORAL at 09:28

## 2019-06-12 RX ADMIN — MAGNESIUM SULFATE HEPTAHYDRATE 2 G: 40 INJECTION, SOLUTION INTRAVENOUS at 10:32

## 2019-06-12 RX ADMIN — ENOXAPARIN SODIUM 40 MG: 40 INJECTION SUBCUTANEOUS at 09:28

## 2019-06-12 RX ADMIN — ONDANSETRON 4 MG: 2 INJECTION INTRAMUSCULAR; INTRAVENOUS at 05:31

## 2019-06-12 RX ADMIN — PIPERACILLIN SODIUM AND TAZOBACTAM SODIUM 3.38 G: 36; 4.5 INJECTION, POWDER, FOR SOLUTION INTRAVENOUS at 12:37

## 2019-06-12 RX ADMIN — OXYCODONE HYDROCHLORIDE 5 MG: 5 TABLET ORAL at 05:31

## 2019-06-12 RX ADMIN — PIPERACILLIN SODIUM AND TAZOBACTAM SODIUM 3.38 G: 36; 4.5 INJECTION, POWDER, FOR SOLUTION INTRAVENOUS at 18:10

## 2019-06-12 RX ADMIN — ATORVASTATIN CALCIUM 10 MG: 10 TABLET, FILM COATED ORAL at 18:10

## 2019-06-12 RX ADMIN — PIPERACILLIN SODIUM AND TAZOBACTAM SODIUM 3.38 G: 36; 4.5 INJECTION, POWDER, FOR SOLUTION INTRAVENOUS at 05:31

## 2019-06-12 RX ADMIN — POTASSIUM CHLORIDE 40 MEQ: 1500 TABLET, EXTENDED RELEASE ORAL at 10:32

## 2019-06-16 LAB
BACTERIA BLD CULT: NORMAL
BACTERIA BLD CULT: NORMAL

## 2019-06-30 ENCOUNTER — APPOINTMENT (EMERGENCY)
Dept: RADIOLOGY | Facility: HOSPITAL | Age: 49
DRG: 438 | End: 2019-06-30
Payer: COMMERCIAL

## 2019-06-30 ENCOUNTER — APPOINTMENT (EMERGENCY)
Dept: CT IMAGING | Facility: HOSPITAL | Age: 49
DRG: 438 | End: 2019-06-30
Payer: COMMERCIAL

## 2019-06-30 ENCOUNTER — HOSPITAL ENCOUNTER (INPATIENT)
Facility: HOSPITAL | Age: 49
LOS: 3 days | Discharge: HOME | DRG: 438 | End: 2019-07-03
Attending: EMERGENCY MEDICINE | Admitting: INTERNAL MEDICINE
Payer: COMMERCIAL

## 2019-06-30 ENCOUNTER — APPOINTMENT (INPATIENT)
Dept: ULTRASOUND IMAGING | Facility: HOSPITAL | Age: 49
DRG: 438 | End: 2019-06-30
Payer: COMMERCIAL

## 2019-06-30 DIAGNOSIS — K85.90 RECURRENT ACUTE PANCREATITIS: ICD-10-CM

## 2019-06-30 DIAGNOSIS — E87.1 HYPONATREMIA: ICD-10-CM

## 2019-06-30 DIAGNOSIS — K85.90 PANCREATITIS: Primary | ICD-10-CM

## 2019-06-30 DIAGNOSIS — E87.6 HYPOKALEMIA: ICD-10-CM

## 2019-06-30 DIAGNOSIS — K86.2 PANCREATIC CYST: ICD-10-CM

## 2019-06-30 PROBLEM — R82.90 ABNORMAL URINALYSIS: Status: ACTIVE | Noted: 2019-06-30

## 2019-06-30 PROBLEM — K86.9 PANCREATIC LESION: Status: ACTIVE | Noted: 2019-06-30

## 2019-06-30 PROBLEM — K59.00 CONSTIPATION: Status: ACTIVE | Noted: 2019-06-30

## 2019-06-30 PROBLEM — R65.10 SIRS (SYSTEMIC INFLAMMATORY RESPONSE SYNDROME) (HCC): Status: ACTIVE | Noted: 2019-06-30

## 2019-06-30 LAB
ALBUMIN SERPL BCP-MCNC: 3.8 G/DL (ref 3.5–5)
ALP SERPL-CCNC: 100 U/L (ref 46–116)
ALT SERPL W P-5'-P-CCNC: 25 U/L (ref 12–78)
ANION GAP SERPL CALCULATED.3IONS-SCNC: 17 MMOL/L (ref 4–13)
APTT PPP: 33 SECONDS (ref 23–37)
AST SERPL W P-5'-P-CCNC: 22 U/L (ref 5–45)
BACTERIA UR QL AUTO: ABNORMAL /HPF
BASOPHILS # BLD AUTO: 0.04 THOUSANDS/ΜL (ref 0–0.1)
BASOPHILS NFR BLD AUTO: 0 % (ref 0–1)
BILIRUB SERPL-MCNC: 1.6 MG/DL (ref 0.2–1)
BILIRUB UR QL STRIP: NEGATIVE
BUN SERPL-MCNC: 7 MG/DL (ref 5–25)
CALCIUM SERPL-MCNC: 8 MG/DL (ref 8.3–10.1)
CHLORIDE SERPL-SCNC: 93 MMOL/L (ref 100–108)
CLARITY UR: CLEAR
CO2 SERPL-SCNC: 21 MMOL/L (ref 21–32)
COLOR UR: YELLOW
CREAT SERPL-MCNC: 0.94 MG/DL (ref 0.6–1.3)
EOSINOPHIL # BLD AUTO: 0.01 THOUSAND/ΜL (ref 0–0.61)
EOSINOPHIL NFR BLD AUTO: 0 % (ref 0–6)
ERYTHROCYTE [DISTWIDTH] IN BLOOD BY AUTOMATED COUNT: 12.9 % (ref 11.6–15.1)
ETHANOL SERPL-MCNC: <3 MG/DL (ref 0–3)
GFR SERPL CREATININE-BSD FRML MDRD: 96 ML/MIN/1.73SQ M
GLUCOSE SERPL-MCNC: 132 MG/DL (ref 65–140)
GLUCOSE UR STRIP-MCNC: NEGATIVE MG/DL
HCT VFR BLD AUTO: 41.4 % (ref 36.5–49.3)
HGB BLD-MCNC: 14.5 G/DL (ref 12–17)
HGB UR QL STRIP.AUTO: ABNORMAL
IMM GRANULOCYTES # BLD AUTO: 0.08 THOUSAND/UL (ref 0–0.2)
IMM GRANULOCYTES NFR BLD AUTO: 1 % (ref 0–2)
INR PPP: 1.1 (ref 0.84–1.19)
KETONES UR STRIP-MCNC: ABNORMAL MG/DL
LACTATE SERPL-SCNC: 0.6 MMOL/L (ref 0.5–2)
LEUKOCYTE ESTERASE UR QL STRIP: NEGATIVE
LIPASE SERPL-CCNC: 1114 U/L (ref 73–393)
LYMPHOCYTES # BLD AUTO: 0.2 THOUSANDS/ΜL (ref 0.6–4.47)
LYMPHOCYTES NFR BLD AUTO: 2 % (ref 14–44)
MCH RBC QN AUTO: 33.3 PG (ref 26.8–34.3)
MCHC RBC AUTO-ENTMCNC: 35 G/DL (ref 31.4–37.4)
MCV RBC AUTO: 95 FL (ref 82–98)
MONOCYTES # BLD AUTO: 0.33 THOUSAND/ΜL (ref 0.17–1.22)
MONOCYTES NFR BLD AUTO: 3 % (ref 4–12)
NEUTROPHILS # BLD AUTO: 11.55 THOUSANDS/ΜL (ref 1.85–7.62)
NEUTS SEG NFR BLD AUTO: 94 % (ref 43–75)
NITRITE UR QL STRIP: NEGATIVE
NON-SQ EPI CELLS URNS QL MICRO: ABNORMAL /HPF
NRBC BLD AUTO-RTO: 0 /100 WBCS
PH UR STRIP.AUTO: 6.5 [PH]
PLATELET # BLD AUTO: 245 THOUSANDS/UL (ref 149–390)
PMV BLD AUTO: 9.9 FL (ref 8.9–12.7)
POTASSIUM SERPL-SCNC: 2.8 MMOL/L (ref 3.5–5.3)
PROT SERPL-MCNC: 7.9 G/DL (ref 6.4–8.2)
PROT UR STRIP-MCNC: ABNORMAL MG/DL
PROTHROMBIN TIME: 13.6 SECONDS (ref 11.6–14.5)
RBC # BLD AUTO: 4.36 MILLION/UL (ref 3.88–5.62)
RBC #/AREA URNS AUTO: ABNORMAL /HPF
SODIUM SERPL-SCNC: 131 MMOL/L (ref 136–145)
SP GR UR STRIP.AUTO: 1.01 (ref 1–1.03)
UROBILINOGEN UR QL STRIP.AUTO: 1 E.U./DL
WBC # BLD AUTO: 12.21 THOUSAND/UL (ref 4.31–10.16)
WBC #/AREA URNS AUTO: ABNORMAL /HPF

## 2019-06-30 PROCEDURE — 80320 DRUG SCREEN QUANTALCOHOLS: CPT | Performed by: PHYSICIAN ASSISTANT

## 2019-06-30 PROCEDURE — C9113 INJ PANTOPRAZOLE SODIUM, VIA: HCPCS | Performed by: INTERNAL MEDICINE

## 2019-06-30 PROCEDURE — 96365 THER/PROPH/DIAG IV INF INIT: CPT

## 2019-06-30 PROCEDURE — 36415 COLL VENOUS BLD VENIPUNCTURE: CPT | Performed by: PHYSICIAN ASSISTANT

## 2019-06-30 PROCEDURE — 80053 COMPREHEN METABOLIC PANEL: CPT | Performed by: PHYSICIAN ASSISTANT

## 2019-06-30 PROCEDURE — 83690 ASSAY OF LIPASE: CPT | Performed by: PHYSICIAN ASSISTANT

## 2019-06-30 PROCEDURE — 71046 X-RAY EXAM CHEST 2 VIEWS: CPT

## 2019-06-30 PROCEDURE — 85730 THROMBOPLASTIN TIME PARTIAL: CPT | Performed by: PHYSICIAN ASSISTANT

## 2019-06-30 PROCEDURE — 96367 TX/PROPH/DG ADDL SEQ IV INF: CPT

## 2019-06-30 PROCEDURE — 96375 TX/PRO/DX INJ NEW DRUG ADDON: CPT

## 2019-06-30 PROCEDURE — 81001 URINALYSIS AUTO W/SCOPE: CPT | Performed by: PHYSICIAN ASSISTANT

## 2019-06-30 PROCEDURE — 93005 ELECTROCARDIOGRAM TRACING: CPT

## 2019-06-30 PROCEDURE — 76705 ECHO EXAM OF ABDOMEN: CPT

## 2019-06-30 PROCEDURE — 99284 EMERGENCY DEPT VISIT MOD MDM: CPT | Performed by: PHYSICIAN ASSISTANT

## 2019-06-30 PROCEDURE — 96361 HYDRATE IV INFUSION ADD-ON: CPT

## 2019-06-30 PROCEDURE — 85610 PROTHROMBIN TIME: CPT | Performed by: PHYSICIAN ASSISTANT

## 2019-06-30 PROCEDURE — 99222 1ST HOSP IP/OBS MODERATE 55: CPT | Performed by: INTERNAL MEDICINE

## 2019-06-30 PROCEDURE — 85025 COMPLETE CBC W/AUTO DIFF WBC: CPT | Performed by: PHYSICIAN ASSISTANT

## 2019-06-30 PROCEDURE — 87040 BLOOD CULTURE FOR BACTERIA: CPT | Performed by: PHYSICIAN ASSISTANT

## 2019-06-30 PROCEDURE — 74177 CT ABD & PELVIS W/CONTRAST: CPT

## 2019-06-30 PROCEDURE — 99223 1ST HOSP IP/OBS HIGH 75: CPT | Performed by: INTERNAL MEDICINE

## 2019-06-30 PROCEDURE — 83605 ASSAY OF LACTIC ACID: CPT | Performed by: PHYSICIAN ASSISTANT

## 2019-06-30 PROCEDURE — 99285 EMERGENCY DEPT VISIT HI MDM: CPT

## 2019-06-30 RX ORDER — FOLIC ACID 1 MG/1
1 TABLET ORAL DAILY
Status: DISCONTINUED | OUTPATIENT
Start: 2019-07-01 | End: 2019-06-30

## 2019-06-30 RX ORDER — ACETAMINOPHEN 325 MG/1
650 TABLET ORAL EVERY 6 HOURS PRN
Status: DISCONTINUED | OUTPATIENT
Start: 2019-06-30 | End: 2019-07-03 | Stop reason: HOSPADM

## 2019-06-30 RX ORDER — LORAZEPAM 2 MG/ML
0.5 INJECTION INTRAMUSCULAR ONCE
Status: COMPLETED | OUTPATIENT
Start: 2019-06-30 | End: 2019-06-30

## 2019-06-30 RX ORDER — POTASSIUM CHLORIDE 20 MEQ/1
40 TABLET, EXTENDED RELEASE ORAL ONCE
Status: DISCONTINUED | OUTPATIENT
Start: 2019-06-30 | End: 2019-06-30

## 2019-06-30 RX ORDER — ONDANSETRON 2 MG/ML
4 INJECTION INTRAMUSCULAR; INTRAVENOUS ONCE
Status: COMPLETED | OUTPATIENT
Start: 2019-06-30 | End: 2019-06-30

## 2019-06-30 RX ORDER — MORPHINE SULFATE 4 MG/ML
4 INJECTION, SOLUTION INTRAMUSCULAR; INTRAVENOUS
Status: DISCONTINUED | OUTPATIENT
Start: 2019-06-30 | End: 2019-06-30

## 2019-06-30 RX ORDER — SODIUM CHLORIDE 9 MG/ML
150 INJECTION, SOLUTION INTRAVENOUS CONTINUOUS
Status: DISCONTINUED | OUTPATIENT
Start: 2019-06-30 | End: 2019-06-30

## 2019-06-30 RX ORDER — HYDRALAZINE HYDROCHLORIDE 20 MG/ML
15 INJECTION INTRAMUSCULAR; INTRAVENOUS EVERY 6 HOURS PRN
Status: DISCONTINUED | OUTPATIENT
Start: 2019-06-30 | End: 2019-06-30

## 2019-06-30 RX ORDER — SENNOSIDES 8.6 MG
2 TABLET ORAL 2 TIMES DAILY
Status: DISCONTINUED | OUTPATIENT
Start: 2019-06-30 | End: 2019-07-03 | Stop reason: HOSPADM

## 2019-06-30 RX ORDER — ONDANSETRON 2 MG/ML
4 INJECTION INTRAMUSCULAR; INTRAVENOUS EVERY 6 HOURS PRN
Status: DISCONTINUED | OUTPATIENT
Start: 2019-06-30 | End: 2019-07-03 | Stop reason: HOSPADM

## 2019-06-30 RX ORDER — MORPHINE SULFATE 4 MG/ML
4 INJECTION, SOLUTION INTRAMUSCULAR; INTRAVENOUS ONCE
Status: COMPLETED | OUTPATIENT
Start: 2019-06-30 | End: 2019-06-30

## 2019-06-30 RX ORDER — OXYCODONE HYDROCHLORIDE 5 MG/1
5 TABLET ORAL EVERY 4 HOURS PRN
Status: DISCONTINUED | OUTPATIENT
Start: 2019-06-30 | End: 2019-06-30

## 2019-06-30 RX ORDER — HYDROMORPHONE HCL/PF 1 MG/ML
1 SYRINGE (ML) INJECTION
Status: DISCONTINUED | OUTPATIENT
Start: 2019-06-30 | End: 2019-06-30

## 2019-06-30 RX ORDER — POLYETHYLENE GLYCOL 3350 17 G/17G
17 POWDER, FOR SOLUTION ORAL DAILY PRN
Status: DISCONTINUED | OUTPATIENT
Start: 2019-06-30 | End: 2019-07-03 | Stop reason: HOSPADM

## 2019-06-30 RX ORDER — MAGNESIUM HYDROXIDE/ALUMINUM HYDROXICE/SIMETHICONE 120; 1200; 1200 MG/30ML; MG/30ML; MG/30ML
30 SUSPENSION ORAL EVERY 6 HOURS PRN
Status: DISCONTINUED | OUTPATIENT
Start: 2019-06-30 | End: 2019-07-03 | Stop reason: HOSPADM

## 2019-06-30 RX ORDER — HYDRALAZINE HYDROCHLORIDE 20 MG/ML
15 INJECTION INTRAMUSCULAR; INTRAVENOUS EVERY 4 HOURS PRN
Status: DISCONTINUED | OUTPATIENT
Start: 2019-06-30 | End: 2019-07-03 | Stop reason: HOSPADM

## 2019-06-30 RX ORDER — SODIUM CHLORIDE, SODIUM LACTATE, POTASSIUM CHLORIDE, CALCIUM CHLORIDE 600; 310; 30; 20 MG/100ML; MG/100ML; MG/100ML; MG/100ML
200 INJECTION, SOLUTION INTRAVENOUS CONTINUOUS
Status: DISCONTINUED | OUTPATIENT
Start: 2019-06-30 | End: 2019-06-30

## 2019-06-30 RX ORDER — HYDROMORPHONE HCL/PF 1 MG/ML
1 SYRINGE (ML) INJECTION
Status: DISCONTINUED | OUTPATIENT
Start: 2019-06-30 | End: 2019-07-03 | Stop reason: HOSPADM

## 2019-06-30 RX ORDER — LABETALOL 20 MG/4 ML (5 MG/ML) INTRAVENOUS SYRINGE
10 ONCE AS NEEDED
Status: COMPLETED | OUTPATIENT
Start: 2019-06-30 | End: 2019-06-30

## 2019-06-30 RX ORDER — LABETALOL 20 MG/4 ML (5 MG/ML) INTRAVENOUS SYRINGE
10 EVERY 4 HOURS PRN
Status: COMPLETED | OUTPATIENT
Start: 2019-06-30 | End: 2019-07-01

## 2019-06-30 RX ORDER — HYDRALAZINE HYDROCHLORIDE 20 MG/ML
10 INJECTION INTRAMUSCULAR; INTRAVENOUS ONCE
Status: DISCONTINUED | OUTPATIENT
Start: 2019-06-30 | End: 2019-06-30

## 2019-06-30 RX ORDER — THIAMINE MONONITRATE (VIT B1) 100 MG
100 TABLET ORAL DAILY
Status: DISCONTINUED | OUTPATIENT
Start: 2019-07-01 | End: 2019-06-30

## 2019-06-30 RX ORDER — METOPROLOL TARTRATE 5 MG/5ML
5 INJECTION INTRAVENOUS EVERY 6 HOURS SCHEDULED
Status: DISCONTINUED | OUTPATIENT
Start: 2019-06-30 | End: 2019-07-03 | Stop reason: HOSPADM

## 2019-06-30 RX ORDER — ATORVASTATIN CALCIUM 10 MG/1
10 TABLET, FILM COATED ORAL
Status: DISCONTINUED | OUTPATIENT
Start: 2019-06-30 | End: 2019-07-03 | Stop reason: HOSPADM

## 2019-06-30 RX ORDER — PANTOPRAZOLE SODIUM 40 MG/1
40 INJECTION, POWDER, FOR SOLUTION INTRAVENOUS
Status: DISCONTINUED | OUTPATIENT
Start: 2019-06-30 | End: 2019-07-03 | Stop reason: HOSPADM

## 2019-06-30 RX ORDER — ENALAPRILAT 2.5 MG/2ML
1.25 INJECTION INTRAVENOUS EVERY 6 HOURS
Status: DISCONTINUED | OUTPATIENT
Start: 2019-06-30 | End: 2019-07-03 | Stop reason: HOSPADM

## 2019-06-30 RX ORDER — HYDROMORPHONE HCL 110MG/55ML
2 PATIENT CONTROLLED ANALGESIA SYRINGE INTRAVENOUS ONCE
Status: COMPLETED | OUTPATIENT
Start: 2019-06-30 | End: 2019-06-30

## 2019-06-30 RX ORDER — POTASSIUM CHLORIDE 20 MEQ/1
40 TABLET, EXTENDED RELEASE ORAL ONCE
Status: COMPLETED | OUTPATIENT
Start: 2019-06-30 | End: 2019-06-30

## 2019-06-30 RX ORDER — PANTOPRAZOLE SODIUM 40 MG/1
40 TABLET, DELAYED RELEASE ORAL
Status: DISCONTINUED | OUTPATIENT
Start: 2019-06-30 | End: 2019-06-30

## 2019-06-30 RX ORDER — LORAZEPAM 2 MG/ML
1 INJECTION INTRAMUSCULAR ONCE
Status: COMPLETED | OUTPATIENT
Start: 2019-06-30 | End: 2019-06-30

## 2019-06-30 RX ORDER — KETOROLAC TROMETHAMINE 30 MG/ML
15 INJECTION, SOLUTION INTRAMUSCULAR; INTRAVENOUS ONCE
Status: COMPLETED | OUTPATIENT
Start: 2019-06-30 | End: 2019-06-30

## 2019-06-30 RX ORDER — NICOTINE 21 MG/24HR
1 PATCH, TRANSDERMAL 24 HOURS TRANSDERMAL DAILY
Status: DISCONTINUED | OUTPATIENT
Start: 2019-06-30 | End: 2019-07-03 | Stop reason: HOSPADM

## 2019-06-30 RX ORDER — SODIUM CHLORIDE, SODIUM GLUCONATE, SODIUM ACETATE, POTASSIUM CHLORIDE, MAGNESIUM CHLORIDE, SODIUM PHOSPHATE, DIBASIC, AND POTASSIUM PHOSPHATE .53; .5; .37; .037; .03; .012; .00082 G/100ML; G/100ML; G/100ML; G/100ML; G/100ML; G/100ML; G/100ML
250 INJECTION, SOLUTION INTRAVENOUS CONTINUOUS
Status: DISCONTINUED | OUTPATIENT
Start: 2019-06-30 | End: 2019-07-03 | Stop reason: HOSPADM

## 2019-06-30 RX ORDER — HYDRALAZINE HYDROCHLORIDE 20 MG/ML
5 INJECTION INTRAMUSCULAR; INTRAVENOUS EVERY 6 HOURS PRN
Status: DISCONTINUED | OUTPATIENT
Start: 2019-06-30 | End: 2019-06-30

## 2019-06-30 RX ORDER — LISINOPRIL 5 MG/1
20 TABLET ORAL DAILY
Status: DISCONTINUED | OUTPATIENT
Start: 2019-06-30 | End: 2019-06-30

## 2019-06-30 RX ORDER — FENOFIBRATE 145 MG/1
145 TABLET, COATED ORAL DAILY
Status: DISCONTINUED | OUTPATIENT
Start: 2019-06-30 | End: 2019-07-03 | Stop reason: HOSPADM

## 2019-06-30 RX ORDER — KETOROLAC TROMETHAMINE 30 MG/ML
15 INJECTION, SOLUTION INTRAMUSCULAR; INTRAVENOUS ONCE
Status: DISCONTINUED | OUTPATIENT
Start: 2019-06-30 | End: 2019-06-30

## 2019-06-30 RX ORDER — POTASSIUM CHLORIDE 14.9 MG/ML
20 INJECTION INTRAVENOUS
Status: COMPLETED | OUTPATIENT
Start: 2019-06-30 | End: 2019-06-30

## 2019-06-30 RX ADMIN — HYDRALAZINE HYDROCHLORIDE 15 MG: 20 INJECTION INTRAMUSCULAR; INTRAVENOUS at 23:30

## 2019-06-30 RX ADMIN — MORPHINE SULFATE 4 MG: 4 INJECTION INTRAVENOUS at 13:51

## 2019-06-30 RX ADMIN — SODIUM CHLORIDE, SODIUM LACTATE, POTASSIUM CHLORIDE, AND CALCIUM CHLORIDE 200 ML/HR: .6; .31; .03; .02 INJECTION, SOLUTION INTRAVENOUS at 11:55

## 2019-06-30 RX ADMIN — THIAMINE HYDROCHLORIDE 100 MG: 100 INJECTION, SOLUTION INTRAMUSCULAR; INTRAVENOUS at 08:56

## 2019-06-30 RX ADMIN — KETOROLAC TROMETHAMINE 15 MG: 30 INJECTION, SOLUTION INTRAMUSCULAR at 08:49

## 2019-06-30 RX ADMIN — LORAZEPAM 0.5 MG: 2 INJECTION INTRAMUSCULAR; INTRAVENOUS at 16:30

## 2019-06-30 RX ADMIN — HYDRALAZINE HYDROCHLORIDE 5 MG: 20 INJECTION INTRAMUSCULAR; INTRAVENOUS at 14:26

## 2019-06-30 RX ADMIN — POTASSIUM CHLORIDE 40 MEQ: 1500 TABLET, EXTENDED RELEASE ORAL at 09:48

## 2019-06-30 RX ADMIN — HYDROMORPHONE HYDROCHLORIDE 2 MG: 2 INJECTION INTRAMUSCULAR; INTRAVENOUS; SUBCUTANEOUS at 16:29

## 2019-06-30 RX ADMIN — PANTOPRAZOLE SODIUM 40 MG: 40 INJECTION, POWDER, FOR SOLUTION INTRAVENOUS at 15:39

## 2019-06-30 RX ADMIN — ENALAPRILAT 1.25 MG: 1.25 INJECTION INTRAVENOUS at 21:51

## 2019-06-30 RX ADMIN — HYDROMORPHONE HYDROCHLORIDE 1 MG: 1 INJECTION, SOLUTION INTRAMUSCULAR; INTRAVENOUS; SUBCUTANEOUS at 19:28

## 2019-06-30 RX ADMIN — ONDANSETRON 4 MG: 2 INJECTION INTRAMUSCULAR; INTRAVENOUS at 08:48

## 2019-06-30 RX ADMIN — SODIUM CHLORIDE, SODIUM GLUCONATE, SODIUM ACETATE, POTASSIUM CHLORIDE AND MAGNESIUM CHLORIDE 250 ML/HR: 526; 502; 368; 37; 30 INJECTION, SOLUTION INTRAVENOUS at 19:47

## 2019-06-30 RX ADMIN — MORPHINE SULFATE 2 MG: 2 INJECTION, SOLUTION INTRAMUSCULAR; INTRAVENOUS at 11:56

## 2019-06-30 RX ADMIN — SODIUM CHLORIDE 1000 ML: 0.9 INJECTION, SOLUTION INTRAVENOUS at 08:28

## 2019-06-30 RX ADMIN — FOLIC ACID 1 MG: 5 INJECTION, SOLUTION INTRAMUSCULAR; INTRAVENOUS; SUBCUTANEOUS at 09:47

## 2019-06-30 RX ADMIN — POTASSIUM CHLORIDE 20 MEQ: 200 INJECTION, SOLUTION INTRAVENOUS at 18:37

## 2019-06-30 RX ADMIN — ENALAPRILAT 1.25 MG: 1.25 INJECTION INTRAVENOUS at 15:54

## 2019-06-30 RX ADMIN — OXYCODONE HYDROCHLORIDE 5 MG: 5 TABLET ORAL at 13:07

## 2019-06-30 RX ADMIN — MORPHINE SULFATE 4 MG: 4 INJECTION INTRAVENOUS at 08:54

## 2019-06-30 RX ADMIN — LORAZEPAM 1 MG: 2 INJECTION INTRAMUSCULAR; INTRAVENOUS at 08:51

## 2019-06-30 RX ADMIN — HYDROMORPHONE HYDROCHLORIDE 1 MG: 1 INJECTION, SOLUTION INTRAMUSCULAR; INTRAVENOUS; SUBCUTANEOUS at 15:39

## 2019-06-30 RX ADMIN — METOPROLOL TARTRATE 5 MG: 5 INJECTION, SOLUTION INTRAVENOUS at 16:12

## 2019-06-30 RX ADMIN — HYDROMORPHONE HYDROCHLORIDE 1 MG: 1 INJECTION, SOLUTION INTRAMUSCULAR; INTRAVENOUS; SUBCUTANEOUS at 18:44

## 2019-06-30 RX ADMIN — HYDRALAZINE HYDROCHLORIDE 15 MG: 20 INJECTION INTRAMUSCULAR; INTRAVENOUS at 18:49

## 2019-06-30 RX ADMIN — POTASSIUM CHLORIDE 20 MEQ: 200 INJECTION, SOLUTION INTRAVENOUS at 15:45

## 2019-06-30 RX ADMIN — ONDANSETRON 4 MG: 2 INJECTION INTRAMUSCULAR; INTRAVENOUS at 13:50

## 2019-06-30 RX ADMIN — HYDROMORPHONE HYDROCHLORIDE 1 MG: 1 INJECTION, SOLUTION INTRAMUSCULAR; INTRAVENOUS; SUBCUTANEOUS at 23:39

## 2019-06-30 RX ADMIN — LABETALOL 20 MG/4 ML (5 MG/ML) INTRAVENOUS SYRINGE 10 MG: at 19:28

## 2019-06-30 RX ADMIN — MORPHINE SULFATE 2 MG: 2 INJECTION, SOLUTION INTRAMUSCULAR; INTRAVENOUS at 10:45

## 2019-06-30 RX ADMIN — SODIUM CHLORIDE, SODIUM LACTATE, POTASSIUM CHLORIDE, AND CALCIUM CHLORIDE 200 ML/HR: .6; .31; .03; .02 INJECTION, SOLUTION INTRAVENOUS at 18:37

## 2019-06-30 RX ADMIN — IOHEXOL 100 ML: 350 INJECTION, SOLUTION INTRAVENOUS at 09:23

## 2019-06-30 RX ADMIN — HYDROMORPHONE HYDROCHLORIDE 1 MG: 1 INJECTION, SOLUTION INTRAMUSCULAR; INTRAVENOUS; SUBCUTANEOUS at 22:23

## 2019-06-30 RX ADMIN — HYDROMORPHONE HYDROCHLORIDE 1 MG: 1 INJECTION, SOLUTION INTRAMUSCULAR; INTRAVENOUS; SUBCUTANEOUS at 21:11

## 2019-06-30 NOTE — ASSESSMENT & PLAN NOTE
· Likely due to patient's vomiting  · Status post replacement in the ER, but may not be enough, thus continue with replacement  · Monitor electrolytes

## 2019-06-30 NOTE — LETTER
[place]  [address] [facility] [facility] [place] [address]  Dept: [phone]    July 3, 2019     Patient: Alva Charter   YOB: 1970   Date of Visit: 6/30/2019       To Whom it May Concern:    Tricia Isidro is under my professional care  He was seen in the hospital from 6/30/2019   to 07/03/19  He may return to work on July 8 without limitations  If you have any questions or concerns, please don't hesitate to call           Sincerely,          Isabel Shell MD

## 2019-06-30 NOTE — ASSESSMENT & PLAN NOTE
· Likely hypovolemic hyponatremia due to acute pancreatitis  · IV fluids with normal saline  · Monitor  · For further workup and management if no improvement

## 2019-06-30 NOTE — ED PROVIDER NOTES
History  Chief Complaint   Patient presents with    Flank Pain     Patient reports feeling b/l flank pain and abdominal pain that started Friday after work  Nausea present  Maylin Peter is a 53 y o  male with past medical history of hyperlipidemia, hypertension and alcohol-induced pancreatitis who presents to the ED with complaints of bilateral flank pain, nausea and bilious vomiting since Friday night  Patient states initially he felt like he had pulled a muscle in his abdomen at work  Patient states today he noticed that the pain radiated from his bilateral flanks to his middle abdomen  Patient was hospitalized 06/10-12 for local who induced pancreatitis  Patient states since discharge she has had 2 alcoholic beverages (1 beer on Tuesday 06/25 and 1 beer on Thursday 06/27)  Patient is a past 2 days has been feeling shaky and chills  Patient states he took at 2 Advil today without relief of pain  Patient is a current everyday smoker  Patient states he has noticed darker discoloration to his urine (orange)  Patient states he has a decreased appetite last ate eggs on Saturday morning  Denies constipation, diarrhea, blood in stool, hematemesis, hemoptysis, cough, urinary frequency, urinary urgency, hematuria, dysuria, penile pain, penile lesions, penile discharge, testicular pain, testicular swelling, hallucinations, delusions, chest pain, shortness of breath, fever  MRI Abdomen 06/11/19 Reviewed: Acute pancreatitis centered at the pancreatic head and uncinate process  An ill-defined, nonenhancing fluid collection in the pancreatic head measures 1 2 x 1 0 x 1 2 cm likely represents evolving evolving pseudocyst versus phlegmon  Neoplasm unlikely         History provided by:  Patient  Flank Pain   Pain location:  L flank and R flank  Pain quality: cramping and throbbing    Pain radiates to:  Suprapubic region  Duration:  3 days  Timing:  Constant  Context: alcohol use    Context: not sick contacts and not suspicious food intake    Associated symptoms: anorexia, chills, nausea and vomiting    Associated symptoms: no belching, no chest pain, no constipation, no cough, no diarrhea, no dysuria, no fatigue, no fever, no flatus, no hematemesis, no hematochezia, no hematuria, no melena, no shortness of breath and no sore throat    Risk factors: alcohol abuse        Prior to Admission Medications   Prescriptions Last Dose Informant Patient Reported? Taking?   atorvastatin (LIPITOR) 10 mg tablet   Yes No   Sig: Take 10 mg by mouth daily   fenofibrate (TRICOR) 145 mg tablet   No No   Sig: Take 1 tablet (145 mg total) by mouth daily   folic acid (FOLVITE) 1 mg tablet   No No   Sig: Take 1 tablet (1 mg total) by mouth daily   lansoprazole (PREVACID) 30 mg capsule   Yes No   Sig: Take 25 mg by mouth daily   lisinopril (ZESTRIL) 20 mg tablet   No No   Sig: Take 1 tablet by mouth daily   metoprolol tartrate (LOPRESSOR) 25 mg tablet   Yes No   Sig: Take 25 mg by mouth every 12 (twelve) hours   thiamine 100 MG tablet   No No   Sig: Take 1 tablet (100 mg total) by mouth daily      Facility-Administered Medications: None       Past Medical History:   Diagnosis Date    Hyperlipidemia     Hypertension     Pancreatitis        History reviewed  No pertinent surgical history  History reviewed  No pertinent family history  I have reviewed and agree with the history as documented  Social History     Tobacco Use    Smoking status: Current Every Day Smoker     Packs/day: 1 00     Types: Cigarettes    Smokeless tobacco: Never Used    Tobacco comment: Pt stated he is ready to quit sometimes   Substance Use Topics    Alcohol use: Yes     Comment: "alot" 1 bottle of voldka lasts a week    Drug use: No        Review of Systems   Constitutional: Positive for appetite change and chills  Negative for fatigue, fever and unexpected weight change     HENT: Negative for congestion, drooling, ear pain, rhinorrhea, sore throat, trouble swallowing and voice change  Eyes: Negative for pain, discharge, redness and visual disturbance  Respiratory: Negative for cough, shortness of breath, wheezing and stridor  Cardiovascular: Negative for chest pain, palpitations and leg swelling  Gastrointestinal: Positive for abdominal pain, anorexia, nausea and vomiting  Negative for blood in stool, constipation, diarrhea, flatus, hematemesis, hematochezia, melena and rectal pain  Genitourinary: Positive for flank pain  Negative for difficulty urinating, discharge, dysuria, enuresis, frequency, genital sores, hematuria, penile swelling, scrotal swelling, testicular pain and urgency  Musculoskeletal: Negative for gait problem, joint swelling, neck pain and neck stiffness  Skin: Negative for color change and rash  Neurological: Positive for tremors and weakness  Negative for dizziness, seizures, syncope, facial asymmetry, speech difficulty, light-headedness, numbness and headaches  Physical Exam  Physical Exam   Constitutional: He is oriented to person, place, and time  Vital signs are normal  He appears well-developed and well-nourished  He appears distressed  HENT:   Head: Normocephalic and atraumatic  Nose: Nose normal    Mouth/Throat: Oropharynx is clear and moist    Eyes: Pupils are equal, round, and reactive to light  Conjunctivae and EOM are normal    Cardiovascular: Regular rhythm and intact distal pulses  Tachycardia present  Pulmonary/Chest: Effort normal and breath sounds normal    Abdominal: Soft  Bowel sounds are normal  There is tenderness in the epigastric area and periumbilical area  There is no rigidity, no rebound, no guarding and no CVA tenderness  TTP of the left and right flank  No peritoneal signs  Musculoskeletal: Normal range of motion  Neurological: He is alert and oriented to person, place, and time  He has normal strength  He displays tremor  GCS eye subscore is 4  GCS verbal subscore is 5   GCS motor subscore is 6  Skin: Skin is warm and dry  Capillary refill takes less than 2 seconds  Nursing note and vitals reviewed        Vital Signs  ED Triage Vitals   Temperature Pulse Respirations Blood Pressure SpO2   06/30/19 0811 06/30/19 0808 06/30/19 0808 06/30/19 0808 06/30/19 0808   98 8 °F (37 1 °C) (!) 117 20 (!) 195/117 96 %      Temp Source Heart Rate Source Patient Position - Orthostatic VS BP Location FiO2 (%)   06/30/19 0811 06/30/19 0808 06/30/19 0853 06/30/19 0808 --   Oral Monitor Lying Right arm       Pain Score       06/30/19 0853       8           Vitals:    06/30/19 0808 06/30/19 0853   BP: (!) 195/117 (!) 171/101   Pulse: (!) 117 (!) 108   Patient Position - Orthostatic VS:  Lying         Visual Acuity      ED Medications  Medications   sodium chloride 0 9 % bolus 1,000 mL (1,000 mL Intravenous New Bag 6/30/19 0828)   ondansetron (ZOFRAN) injection 4 mg (4 mg Intravenous Given 6/30/19 0848)   morphine (PF) 4 mg/mL injection 4 mg (4 mg Intravenous Given 6/30/19 0854)   LORazepam (ATIVAN) 2 mg/mL injection 1 mg (1 mg Intravenous Given 6/30/19 0851)   thiamine (VITAMIN B1) 100 mg in sodium chloride 0 9 % 50 mL IVPB (0 mg Intravenous Stopped 1/30/66 8526)   folic acid 1 mg in sodium chloride 0 9 % 50 mL IVPB (1 mg Intravenous New Bag 6/30/19 0947)   ketorolac (TORADOL) injection 15 mg (15 mg Intravenous Given 6/30/19 0849)   potassium chloride (K-DUR,KLOR-CON) CR tablet 40 mEq (40 mEq Oral Given 6/30/19 0948)   iohexol (OMNIPAQUE) 350 MG/ML injection (MULTI-DOSE) 100 mL (100 mL Intravenous Given 6/30/19 0923)       Diagnostic Studies  Results Reviewed     Procedure Component Value Units Date/Time    Urine Microscopic [[id number]]  (Abnormal) Collected:  06/30/19 1014    Lab Status:  Final result Specimen:  Urine, Clean Catch Updated:  06/30/19 1030     RBC, UA 2-4 /hpf      WBC, UA 0-1 /hpf      Epithelial Cells Occasional /hpf      Bacteria, UA Occasional /hpf     UA w Reflex to Microscopic [[id number]]  (Abnormal) Collected:  06/30/19 1014    Lab Status:  Final result Specimen:  Urine, Clean Catch Updated:  06/30/19 1019     Color, UA Yellow     Clarity, UA Clear     Specific Gravity, UA 1 010     pH, UA 6 5     Leukocytes, UA Negative     Nitrite, UA Negative     Protein, UA 30 (1+) mg/dl      Glucose, UA Negative mg/dl      Ketones, UA 40 (2+) mg/dl      Urobilinogen, UA 1 0 E U /dl      Bilirubin, UA Negative     Blood, UA Moderate    Ethanol [[id number]]  (Normal) Collected:  06/30/19 0826    Lab Status:  Final result Specimen:  Blood from Arm, Right Updated:  06/30/19 0916     Ethanol Lvl <3 mg/dL     CBC and differential [[id number]]  (Abnormal) Collected:  06/30/19 0826    Lab Status:  Final result Specimen:  Blood from Arm, Right Updated:  06/30/19 0914     WBC 12 21 Thousand/uL      RBC 4 36 Million/uL      Hemoglobin 14 5 g/dL      Hematocrit 41 4 %      MCV 95 fL      MCH 33 3 pg      MCHC 35 0 g/dL      RDW 12 9 %      MPV 9 9 fL      Platelets 898 Thousands/uL      nRBC 0 /100 WBCs      Neutrophils Relative 94 %      Immat GRANS % 1 %      Lymphocytes Relative 2 %      Monocytes Relative 3 %      Eosinophils Relative 0 %      Basophils Relative 0 %      Neutrophils Absolute 11 55 Thousands/µL      Immature Grans Absolute 0 08 Thousand/uL      Lymphocytes Absolute 0 20 Thousands/µL      Monocytes Absolute 0 33 Thousand/µL      Eosinophils Absolute 0 01 Thousand/µL      Basophils Absolute 0 04 Thousands/µL     Narrative: This is an appended report  These results have been appended to a previously verified report  Lactic acid, plasma x2 [[id number]]  (Normal) Collected:  06/30/19 0826    Lab Status:  Final result Specimen:  Blood from Arm, Right Updated:  06/30/19 0858     LACTIC ACID 0 6 mmol/L     Narrative:       Result may be elevated if tourniquet was used during collection  Blood culture #1 [[id number]] Collected:  06/30/19 0845    Lab Status:   In process Specimen:  Blood from Arm, Left Updated:  06/30/19 0858    Comprehensive metabolic panel [[id number]]  (Abnormal) Collected:  06/30/19 0826    Lab Status:  Final result Specimen:  Blood from Arm, Right Updated:  06/30/19 0855     Sodium 131 mmol/L      Potassium 2 8 mmol/L      Chloride 93 mmol/L      CO2 21 mmol/L      ANION GAP 17 mmol/L      BUN 7 mg/dL      Creatinine 0 94 mg/dL      Glucose 132 mg/dL      Calcium 8 0 mg/dL      AST 22 U/L      ALT 25 U/L      Alkaline Phosphatase 100 U/L      Total Protein 7 9 g/dL      Albumin 3 8 g/dL      Total Bilirubin 1 60 mg/dL      eGFR 96 ml/min/1 73sq m     Narrative:       [place] guidelines for Chronic Kidney Disease (CKD):     Stage 1 with normal or high GFR (GFR > 90 mL/min/1 73 square meters)    Stage 2 Mild CKD (GFR = 60-89 mL/min/1 73 square meters)    Stage 3A Moderate CKD (GFR = 45-59 mL/min/1 73 square meters)    Stage 3B Moderate CKD (GFR = 30-44 mL/min/1 73 square meters)    Stage 4 Severe CKD (GFR = 15-29 mL/min/1 73 square meters)    Stage 5 End Stage CKD (GFR <15 mL/min/1 73 square meters)  Note: GFR calculation is accurate only with a steady state creatinine    Lipase [[id number]]  (Abnormal) Collected:  06/30/19 0826    Lab Status:  Final result Specimen:  Blood from Arm, Right Updated:  06/30/19 0855     Lipase 1,114 u/L     Protime-INR [[id number]]  (Normal) Collected:  06/30/19 0826    Lab Status:  Final result Specimen:  Blood from Arm, Right Updated:  06/30/19 0850     Protime 13 6 seconds      INR 1 10    APTT [[id number]]  (Normal) Collected:  06/30/19 0826    Lab Status:  Final result Specimen:  Blood from Arm, Right Updated:  06/30/19 0850     PTT 33 seconds     Blood culture #2 [[id number]] Collected:  06/30/19 0826    Lab Status:   In process Specimen:  Blood from Arm, Right Updated:  06/30/19 0833                 CT abdomen pelvis with contrast   Final Result by Gwenith Ahumada, MD (06/30 8199)      Enlarging lesion of the head of the pancreas, set for follow-up in September  Otherwise there is subtle inflammation adjacent to the pancreas  It's difficult to say if this is due to ongoing pancreatitis, or merely a sequela of the previous pancreatitis  Workstation performed: [id number]         XR chest 2 views   ED Interpretation by Brennon Gonzalez PA-C (06/30 0930)   No acute cardiopulmonary disease      Final Result by Sarah Stout MD (06/30 1017)      No acute cardiopulmonary disease  Workstation performed: [id number]                    Procedures  ECG 12 Lead Documentation Only  Date/Time: 6/30/2019 8:30 AM  Performed by: Brennon Gonzalez PA-C  Authorized by: Abigail Carlisle DO     Indications / Diagnosis:  Abdominal Pain, Tremors  ECG reviewed by me, the ED Provider: yes    Patient location:  ED  Previous ECG:     Previous ECG:  Compared to current    Similarity:  No change    Comparison to cardiac monitor: Yes    Interpretation:     Interpretation: abnormal    Rate:     ECG rate:  110    ECG rate assessment: tachycardic    Rhythm:     Rhythm: sinus tachycardia    QRS:     QRS axis:  Normal  ST segments:     ST segments:  Normal  T waves:     T waves: normal    Comments:      No acute ST or T wave changes  QT//498  ED Course  ED Course as of Jun 30 1033   Sun Jun 30, 2019   0859 Lipase(!)   1023 Case was discussed with OTTO, agreeable to admission  MDM  Number of Diagnoses or Management Options  Hypokalemia: new and requires workup  Hyponatremia: new and requires workup  Pancreatitis: new and requires workup  Diagnosis management comments: Discussed with SLIM  We had a detailed discussion of the patient's condition and case, including need for admission   Accepts to his/her service   Bed request/bridging orders placed          Amount and/or Complexity of Data Reviewed  Clinical lab tests: reviewed and ordered  Tests in the radiology section of CPT®: ordered and reviewed  Review and summarize past medical records: yes    Risk of Complications, Morbidity, and/or Mortality  Presenting problems: high  Diagnostic procedures: high  Management options: high    Patient Progress  Patient progress: stable      Disposition  Final diagnoses:   Hyponatremia   Hypokalemia   Pancreatitis     Time reflects when diagnosis was documented in both MDM as applicable and the Disposition within this note     Time User Action Codes Description Comment    6/30/2019  9:24 AM Nancy Going Add [E87 1] Hyponatremia     6/30/2019  9:24 AM Nancy Going Add [E87 6] Hypokalemia     6/30/2019  9:24 AM Nancy Going Add [F10 20] Chronic alcoholism ([facility] [address] )     6/30/2019  9:24 AM Nancy Going Remove [F10 20] Chronic alcoholism ([facility] [address] )     6/30/2019  9:56 AM Nancy Going Add [K85 90] Pancreatitis     6/30/2019  9:56 AM Nancy Going Modify [E87 1] Hyponatremia     6/30/2019  9:56 AM Nancy Going Modify [K85 90] Pancreatitis       ED Disposition     ED Disposition Condition Date/Time Comment    Admit Stable Sun Jun 30, 2019 10:21 AM Case was discussed with Dr Allen Evans and the patient's admission status was agreed to be Admission Status: inpatient status to the service of Dr Allen Evans   Follow-up Information    None         Patient's Medications   Discharge Prescriptions    No medications on file     No discharge procedures on file      ED Provider  Electronically Signed by           Bhavin Martínez PA-C  06/30/19 3790

## 2019-06-30 NOTE — ASSESSMENT & PLAN NOTE
· On the last admission, patient had a triglyceride level more than 1,000  · Check lipid profile  · Continue patient's atorvastatin and Tricor  May need to increase the dose, if lipid profile is not yet well controlled

## 2019-06-30 NOTE — PLAN OF CARE
Problem: GASTROINTESTINAL - ADULT  Goal: Minimal or absence of nausea and/or vomiting  Description  INTERVENTIONS:  - Administer IV fluids as ordered to ensure adequate hydration  - Maintain NPO status until nausea and vomiting are resolved  - Nasogastric tube as ordered  - Administer ordered antiemetic medications as needed  - Provide nonpharmacologic comfort measures as appropriate  - Advance diet as tolerated, if ordered  - Nutrition services referral to assist patient with adequate nutrition and appropriate food choices  Outcome: Progressing  Goal: Maintains or returns to baseline bowel function  Description  INTERVENTIONS:  - Assess bowel function  - Encourage oral fluids to ensure adequate hydration  - Administer IV fluids as ordered to ensure adequate hydration  - Administer ordered medications as needed  - Encourage mobilization and activity  - Nutrition services referral to assist patient with appropriate food choices  Outcome: Progressing  Goal: Maintains adequate nutritional intake  Description  INTERVENTIONS:  - Monitor percentage of each meal consumed  - Identify factors contributing to decreased intake, treat as appropriate  - Assist with meals as needed  - Monitor I&O, WT and lab values  - Obtain nutrition services referral as needed  Outcome: Progressing     Problem: METABOLIC, FLUID AND ELECTROLYTES - ADULT  Goal: Fluid balance maintained  Description  INTERVENTIONS:  - Monitor labs and assess for signs and symptoms of volume excess or deficit  - Monitor I/O and WT  - Instruct patient on fluid and nutrition as appropriate  Outcome: Progressing     Problem: PAIN - ADULT  Goal: Verbalizes/displays adequate comfort level or baseline comfort level  Description  Interventions:  - Encourage patient to monitor pain and request assistance  - Assess pain using appropriate pain scale  - Administer analgesics based on type and severity of pain and evaluate response  - Implement non-pharmacological measures as appropriate and evaluate response  - Consider cultural and social influences on pain and pain management  - Notify physician/advanced practitioner if interventions unsuccessful or patient reports new pain  Outcome: Progressing     Problem: SAFETY ADULT  Goal: Patient will remain free of falls  Description  INTERVENTIONS:  - Assess patient frequently for physical needs  -  Identify cognitive and physical deficits and behaviors that affect risk of falls    -  [place] fall precautions as indicated by assessment   - Educate patient/family on patient safety including physical limitations  - Instruct patient to call for assistance with activity based on assessment  - Modify environment to reduce risk of injury  - Consider OT/PT consult to assist with strengthening/mobility  Outcome: Progressing

## 2019-06-30 NOTE — PROGRESS NOTES
I was informed by the patient's nurse, that patient's blood pressure is still high  And that patient still having abdominal pains  Thus I went to see the patient  Patient is coherent and presently not in acute distress  Patient told me that he still has significant/severe abdominal pains  Otherwise no other complaints  Blood pressure was noted to be 230/135  Abdominal exam was soft, positive for direct tenderness at the epigastric area, nondistended, no rebound or guarding  IV enalapril was just given  IV Lopressor the was ordered awhile ago, was not yet given, thus will give this  I again ordered a stat dose of Dilaudid and I also ordered a dose of 0 5 mg of Ativan  I spoke to the advance practitioner for critical care about this case  If blood pressures do not go down, patient may need titratable IV blood pressure drip  If that will be the case, patient will be moved to step-down or critical care  I informed the nurse about this plans

## 2019-06-30 NOTE — ASSESSMENT & PLAN NOTE
· Patient drank beer twice last week/a few days ago  · Patient was counseled regarding importance of alcohol cessation to prevent recurrence of pancreatitis  · Possibility of meeting our HOST staff and possible alcohol rehab were discussed with the patient, however, patient refused  He told me that he will quit on his own  · Patient complained of chills, which may be a possible manifestation of alcohol withdrawal, thus patient was given a dose of IV Ativan in the emergency room  · We will place the patient on CIWA protocol  · Continue patient's thiamine and folate  Have the patient on multivitamins

## 2019-06-30 NOTE — ASSESSMENT & PLAN NOTE
· This was also found on patient's previous CT scan and MRI on the last admission, however, on today's CT scan, the lesion is enlarging  · According to the GI doctor on the last admission, likely pancreatic head cystic lesion, likely representing a pseudocyst   Likely complication from pancreatitis with a possibility of resolution  Thus GI doctor recommended a repeat MRI/MRCP in 6 weeks  · With SIRS, GI should address if they think patient has pancreatic cyst/pseudocyst infection or not  · GI consult

## 2019-06-30 NOTE — PLAN OF CARE
Problem: GASTROINTESTINAL - ADULT  Goal: Minimal or absence of nausea and/or vomiting  Description  INTERVENTIONS:  - Administer IV fluids as ordered to ensure adequate hydration  - Maintain NPO status until nausea and vomiting are resolved  - Nasogastric tube as ordered  - Administer ordered antiemetic medications as needed  - Provide nonpharmacologic comfort measures as appropriate  - Advance diet as tolerated, if ordered  - Nutrition services referral to assist patient with adequate nutrition and appropriate food choices  Outcome: Progressing  Goal: Maintains or returns to baseline bowel function  Description  INTERVENTIONS:  - Assess bowel function  - Encourage oral fluids to ensure adequate hydration  - Administer IV fluids as ordered to ensure adequate hydration  - Administer ordered medications as needed  - Encourage mobilization and activity  - Nutrition services referral to assist patient with appropriate food choices  Outcome: Progressing  Goal: Maintains adequate nutritional intake  Description  INTERVENTIONS:  - Monitor percentage of each meal consumed  - Identify factors contributing to decreased intake, treat as appropriate  - Assist with meals as needed  - Monitor I&O, WT and lab values  - Obtain nutrition services referral as needed  Outcome: Progressing     Problem: METABOLIC, FLUID AND ELECTROLYTES - ADULT  Goal: Fluid balance maintained  Description  INTERVENTIONS:  - Monitor labs and assess for signs and symptoms of volume excess or deficit  - Monitor I/O and WT  - Instruct patient on fluid and nutrition as appropriate  Outcome: Progressing     Problem: PAIN - ADULT  Goal: Verbalizes/displays adequate comfort level or baseline comfort level  Description  Interventions:  - Encourage patient to monitor pain and request assistance  - Assess pain using appropriate pain scale  - Administer analgesics based on type and severity of pain and evaluate response  - Implement non-pharmacological measures as appropriate and evaluate response  - Consider cultural and social influences on pain and pain management  - Notify physician/advanced practitioner if interventions unsuccessful or patient reports new pain  Outcome: Progressing     Problem: SAFETY ADULT  Goal: Patient will remain free of falls  Description  INTERVENTIONS:  - Assess patient frequently for physical needs  -  Identify cognitive and physical deficits and behaviors that affect risk of falls    -  [place] fall precautions as indicated by assessment   - Educate patient/family on patient safety including physical limitations  - Instruct patient to call for assistance with activity based on assessment  - Modify environment to reduce risk of injury  - Consider OT/PT consult to assist with strengthening/mobility  Outcome: Progressing

## 2019-06-30 NOTE — ASSESSMENT & PLAN NOTE
· Present on admission, with tachycardia, leukocytosis  Patient complains of chills  · Likely secondary to acute recurrent pancreatitis  · Rule out infectious source  · Blood cultures done in the emergency room, follow-up results  · Patient complains of cough with clear sputum; chest x-ray negative for pneumonia  · Check procalcitonin  · Monitor CBC with differential count  · We will check with gastroenterologist about possibility of an infected pancreatic cyst/pseudocyst   · Patient had bilateral flank pains, however, CT scan of the abdomen pelvis did not reveal any evidence of pyelonephritis or urinary tract infection  Urinalysis did not suggest urinary tract infection  · Monitor temperature  · Observe off antibiotics

## 2019-06-30 NOTE — ASSESSMENT & PLAN NOTE
· Patient was just admitted about 2 weeks ago due to acute uncomplicated alcoholic pancreatitis, with hypertriglyceridemia  · Patient came back today with nausea, vomiting as well as abdominal and bilateral pains  · Patient had some beers twice last week, as he claims  · Patient again was found out to have acute pancreatitis  · IV fluids  · Pain control  · Clear liquid diet in the meantime  · GI consult  · Check lipid profile  · Continue patient's atorvastatin and tricor; may need to increase the doses if patient's lipid profile is not yet well controlled  · Subtle inflammation in the pancreas on CT scan  · Again found is a pancreatic lesion on CT scan  Please see plans under pancreatic lesion  · Importance of alcohol cessation counseling stressed out to the patient  Please see management and plans under alcohol abuse

## 2019-06-30 NOTE — QUICK NOTE
I was informed by the patient's nurse, that patient's blood pressure is 218/122  Patient still having significant and severe abdominal pains  Patient is presently ordered NPO by the GI doctor  Thus patient did not get his oral blood pressure medications today  Thus I will order for IV enalaprilat and IV Lopressor round-the-clock  In the meantime that patient will be on IV blood pressure medications, I will hold off patient's oral metoprolol and oral enalapril  Will continue with IV hydralazine on as needed basis  Thus, patient has hypertensive urgency at this point  If patient's blood pressures continue to be high, despite the changes in medications and pain control, may need to be on an antihypertensive drip

## 2019-06-30 NOTE — ASSESSMENT & PLAN NOTE
· With findings of microscopic hematuria as well as proteinuria  · Will need outpatient workup if not yet done  Follow-up with primary care physician  May need a nephrology visit in the outpatient

## 2019-06-30 NOTE — H&P
H&P- Jarod Ken 1970, 53 y o  male MRN: [id number]    Unit/Bed#: WANDA Encounter: [id number]    Primary Care Provider: Elvira Rush MD   Date and time admitted to hospital: 6/30/2019  8:07 AM        SIRS (systemic inflammatory response syndrome) ([facility] )  Assessment & Plan  · Present on admission, with tachycardia, leukocytosis  Patient complains of chills  · Likely secondary to acute recurrent pancreatitis  · Rule out infectious source  · Blood cultures done in the emergency room, follow-up results  · Patient complains of cough with clear sputum; chest x-ray negative for pneumonia  · Check procalcitonin  · Monitor CBC with differential count  · We will check with gastroenterologist about possibility of an infected pancreatic cyst/pseudocyst   · Patient had bilateral flank pains, however, CT scan of the abdomen pelvis did not reveal any evidence of pyelonephritis or urinary tract infection  Urinalysis did not suggest urinary tract infection  · Monitor temperature  · Observe off antibiotics  * Recurrent acute pancreatitis  Assessment & Plan  · Patient was just admitted about 2 weeks ago due to acute uncomplicated alcoholic pancreatitis, with hypertriglyceridemia  · Patient came back today with nausea, vomiting as well as abdominal and bilateral pains  · Patient had some beers twice last week, as he claims  · Patient again was found out to have acute pancreatitis  · IV fluids  · Pain control  · Clear liquid diet in the meantime  · GI consult  · Check lipid profile  · Continue patient's atorvastatin and tricor; may need to increase the doses if patient's lipid profile is not yet well controlled  · Subtle inflammation in the pancreas on CT scan  · Again found is a pancreatic lesion on CT scan  Please see plans under pancreatic lesion  · Importance of alcohol cessation counseling stressed out to the patient  Please see management and plans under alcohol abuse      Pancreatic lesion  Assessment & Plan  · This was also found on patient's previous CT scan and MRI on the last admission, however, on today's CT scan, the lesion is enlarging  · According to the GI doctor on the last admission, likely pancreatic head cystic lesion, likely representing a pseudocyst   Likely complication from pancreatitis with a possibility of resolution  Thus GI doctor recommended a repeat MRI/MRCP in 6 weeks  · With SIRS, GI should address if they think patient has pancreatic cyst/pseudocyst infection or not  · GI consult  ETOH abuse  Assessment & Plan  · Patient drank beer twice last week/a few days ago  · Patient was counseled regarding importance of alcohol cessation to prevent recurrence of pancreatitis  · Possibility of meeting our HOST staff and possible alcohol rehab were discussed with the patient, however, patient refused  He told me that he will quit on his own  · Patient complained of chills, which may be a possible manifestation of alcohol withdrawal, thus patient was given a dose of IV Ativan in the emergency room  · We will place the patient on CIWA protocol  · Continue patient's thiamine and folate  Have the patient on multivitamins  Abnormal urinalysis  Assessment & Plan  · With findings of microscopic hematuria as well as proteinuria  · Will need outpatient workup if not yet done  Follow-up with primary care physician  May need a nephrology visit in the outpatient  Constipation  Assessment & Plan  · Bowel regimen  Hypokalemia  Assessment & Plan  · Likely due to patient's vomiting  · Status post replacement in the ER, but may not be enough, thus continue with replacement  · Monitor electrolytes  Tobacco dependence  Assessment & Plan  · Smoking cessation counseling done  · Nicotine patch  Mixed hyperlipidemia  Assessment & Plan  · On the last admission, patient had a triglyceride level more than 1,000  · Check lipid profile    · Continue patient's atorvastatin and Tricor  May need to increase the dose, if lipid profile is not yet well controlled  GERD (gastroesophageal reflux disease)  Assessment & Plan  · Continue PPI  Hyponatremia  Assessment & Plan  · Likely hypovolemic hyponatremia due to acute pancreatitis  · IV fluids with normal saline  · Monitor  · For further workup and management if no improvement  VTE Prophylaxis: As per VTE screen, low risk   / reason for no mechanical VTE prophylaxis As per VTE screen, low risk  Code Status:  Full code  POLST: POLST form is not discussed and not completed at this time  Anticipated Length of Stay:  Patient will be admitted on an Inpatient basis with an anticipated length of stay of  greater than 2 midnights  Justification for Hospital Stay:  Due to the above findings and plans  Total Time for Visit, including Counseling / Coordination of Care: 1 hour  Greater than 50% of this total time spent on direct patient counseling and coordination of care  Chief Complaint:   Abdominal pains and nausea and vomiting  History of Present Illness:    Karen Yan is a 53 y o  male who presents to the emergency room at [address] [place] [address] with complaints of abdominal pains and nausea and vomiting  Patient was just admitted here approximately 2 weeks ago with acute pancreatitis secondary to alcohol with triglyceridemia  Last week or a few days ago, patient had some beers  Patient drank beers on 2 occasions last week  Last Friday, or 2 days ago, patient started having bilateral flank pains with nausea and vomiting  Patient vomited the food that he ate with no blood in the vomit  Patient of course did not feel well  Today, this morning, patient again had an episode of nausea and vomiting  And this time, patient's pain now across his upper belly  Patient also claims having chills, but no actual fever as he does not have any thermometer to measure his temperature    Patient also mentioned dark colored urine  But denies any dysuria or any burning sensation on urination or any other urinary symptoms  Patient tells me that he has been constipated since last Friday or 2 days ago  Patient also has some cough, productive with clear phlegm/sputum  Patient also claims of being lightheaded  Otherwise no other complaints  No shortness of breath  Review of Systems:    Review of Systems     Ten point review systems done and they were negative except for the ones I mentioned my history of present illness  Patient denies any headaches, or any loss of consciousness  Patient denies any chest pains  Patient denies any diarrhea  Past Medical and Surgical History:     Past Medical History:   Diagnosis Date    Hyperlipidemia     Hypertension     Pancreatitis        History reviewed  No pertinent surgical history  Meds/Allergies:    Prior to Admission medications    Medication Sig Start Date End Date Taking? Authorizing Provider   atorvastatin (LIPITOR) 10 mg tablet Take 10 mg by mouth daily    Historical Provider, MD   fenofibrate (TRICOR) 145 mg tablet Take 1 tablet (145 mg total) by mouth daily 6/13/19   Rian Moody PA-C   folic acid (FOLVITE) 1 mg tablet Take 1 tablet (1 mg total) by mouth daily 6/12/19   Rian Moody PA-C   lansoprazole (PREVACID) 30 mg capsule Take 25 mg by mouth daily    Historical Provider, MD   lisinopril (ZESTRIL) 20 mg tablet Take 1 tablet by mouth daily 12/16/17   Benny Louis MD   metoprolol tartrate (LOPRESSOR) 25 mg tablet Take 25 mg by mouth every 12 (twelve) hours    Historical Provider, MD   thiamine 100 MG tablet Take 1 tablet (100 mg total) by mouth daily 6/12/19   Ga Sanchez PA-C     Medication list was reviewed  Allergies:    Allergies   Allergen Reactions    Other GI Intolerance     amlodipijne- benzepril       Social History:     Marital Status: /Civil Union     Social History     Substance and Sexual Activity   Alcohol Use Yes Comment: "alot" 1 bottle of voldka lasts a week     Social History     Tobacco Use   Smoking Status Current Every Day Smoker    Packs/day: 1 00    Types: Cigarettes   Smokeless Tobacco Never Used   Tobacco Comment    Pt stated he is ready to quit sometimes     Social History     Substance and Sexual Activity   Drug Use No       Family History:    History reviewed  No pertinent family history  Physical Exam:     Vitals:   Blood Pressure: (!) 161/102 (06/30/19 1041)  Pulse: 100 (06/30/19 1041)  Temperature: 98 8 °F (37 1 °C) (06/30/19 0811)  Temp Source: Oral (06/30/19 1161)  Respirations: 19 (06/30/19 1041)  Weight - Scale: 100 kg (221 lb 5 5 oz) (06/30/19 0808)  SpO2: 98 % (06/30/19 1041)    Physical Exam   Constitutional: He is oriented to person, place, and time  No distress  HENT:   Head: Normocephalic and atraumatic  Mouth/Throat: No oropharyngeal exudate  Dry oral mucosa, dry tongue  Eyes: Conjunctivae are normal  Right eye exhibits no discharge  Left eye exhibits no discharge  No scleral icterus  Neck: No JVD present  No tracheal deviation present  No thyromegaly present  Cardiovascular: Normal rate, regular rhythm and normal heart sounds  Exam reveals no gallop and no friction rub  No murmur heard  Pulmonary/Chest: Effort normal and breath sounds normal  No stridor  No respiratory distress  He has no wheezes  He has no rales  Abdominal: Soft  Bowel sounds are normal  He exhibits no distension and no mass  There is tenderness  There is no rebound and no guarding  Positive for direct tenderness across patient's upper abdomen area  Positive also for bilateral flank pains  Musculoskeletal: He exhibits no edema, tenderness or deformity  Neurological: He is alert and oriented to person, place, and time  No cranial nerve deficit  Skin: Skin is warm and dry  No rash noted  He is not diaphoretic  No erythema  No pallor  Psychiatric: He has a normal mood and affect   His behavior is normal  Thought content normal    Vitals reviewed  Additional Data:     Lab Results: I have personally reviewed pertinent reports  Results from last 7 days   Lab Units 06/30/19  0826   WBC Thousand/uL 12 21*   HEMOGLOBIN g/dL 14 5   HEMATOCRIT % 41 4   PLATELETS Thousands/uL 245   NEUTROS PCT % 94*   LYMPHS PCT % 2*   MONOS PCT % 3*   EOS PCT % 0     Results from last 7 days   Lab Units 06/30/19  0826   SODIUM mmol/L 131*   POTASSIUM mmol/L 2 8*   CHLORIDE mmol/L 93*   CO2 mmol/L 21   BUN mg/dL 7   CREATININE mg/dL 0 94   ANION GAP mmol/L 17*   CALCIUM mg/dL 8 0*   ALBUMIN g/dL 3 8   TOTAL BILIRUBIN mg/dL 1 60*   ALK PHOS U/L 100   ALT U/L 25   AST U/L 22   GLUCOSE RANDOM mg/dL 132     Results from last 7 days   Lab Units 06/30/19  0826   INR  1 10             Results from last 7 days   Lab Units 06/30/19  0826   LACTIC ACID mmol/L 0 6       Imaging: I have personally reviewed pertinent reports  CT abdomen pelvis with contrast   Final Result by Angi Schmitt MD (06/30 1730)      Enlarging lesion of the head of the pancreas, set for follow-up in September  Otherwise there is subtle inflammation adjacent to the pancreas  It's difficult to say if this is due to ongoing pancreatitis, or merely a sequela of the previous pancreatitis  Workstation performed: [id number]         XR chest 2 views   ED Interpretation by Facundo Dickson PA-C (06/30 0930)   No acute cardiopulmonary disease      Final Result by Darin Singh MD (06/30 1017)      No acute cardiopulmonary disease  Workstation performed: [id number]             EKG, Pathology, and Other Studies Reviewed on Admission:   · EKG:  As read by the emergency room physician  Sinus tachycardia 110 per minute  Allscripts / Epic Records Reviewed: Yes     ** Please Note: This note has been constructed using a voice recognition system   **

## 2019-06-30 NOTE — CONSULTS
Consultation - [facility]  Myesha Vera 53 y o  male MRN: [id number]  Unit/Bed#: -01 Encounter: [id number]        Inpatient consult to gastroenterology  Consult performed by: Anatoly Delatorre PA-C  Consult ordered by: Leah Jaime MD          Reason for Consult / Principal Problem: acute recurrent pancreatitis    ASSESSMENT and PLAN:    Principal Problem:    Recurrent acute pancreatitis  Active Problems:    Hyponatremia    GERD (gastroesophageal reflux disease)    Mixed hyperlipidemia    Tobacco dependence    ETOH abuse    Hypokalemia    SIRS (systemic inflammatory response syndrome) (HCC)    Pancreatic lesion    Constipation    Abnormal urinalysis    #1  Acute recurrent pancreatitis with new developing pseudocyst:  Likely 2nd alcohol as well as possible hypertriglyceridemia  Awaiting repeat triglyceride level  Last one was over a 1000 on 06/11  lipase 1114  CT showing previously seen lesion of the head of the pancreas that appears larger and is irregular in shape measuring 1 7 x 2 x 1 6 cm  There is no enhancement to suggest pseudoaneurysm  It probably represents a developing pseudocyst   WBC 12 21 which is likely secondary to inflammation  Afebrile  Doubtful for infected pseudocyst   Is all likely secondary to acute pancreatitis  -discussed with patient about complete alcohol cessation  He does report having 2 beers over the course of the last week  -continue to monitor white blood cell count, temperature, abdominal exam   Any significant worsening, consider repeat imaging to rule out necrosis  -NPO  -lactated Ringer's 250 cc per hour  -antiemetics and pain control  -follow-up triglyceride level  Continue statins  -patient does not have a mature pseudocyst at this time  There is no indication for drainage especially at the small size    There is higher risk for introducing infection with drainage  -------------------------------------------------------------------------------------------------------------------    HPI:  This is a 23-year-old male with a history of alcoholic pancreatitis, hypertriglyceridemia, hypertension, and hyperlipidemia who was recently admitted for acute pancreatitis  He reports that he had worsening pain and vomiting starting yesterday prompting him to come back to the emergency room  He says this feels the same as when he had pancreatitis earlier this month  He reports that prior to his 1st issue with pancreatitis he was drinking heavily  He reports that since his hospitalization he has only had 2 beers  He had 1 on Tuesday and 1 on Thursday this past week  He denies any new medications or herbal supplements  He reports that his bowel movements have been normal, formed, without blood, without diarrhea or constipation  He reports chills but denies any fevers at home  He reports that his urine has been darker  He is currently passing gas  REVIEW OF SYSTEMS:    CONSTITUTIONAL: Denies any fever or rigors  +chills, Decreased appetite, and no recent weight loss  HEENT: No earache or tinnitus  Denies hearing loss or visual disturbances  CARDIOVASCULAR: No chest pain or palpitations  RESPIRATORY: Denies any cough, hemoptysis, shortness of breath or dyspnea on exertion  GASTROINTESTINAL: As noted in the History of Present Illness  GENITOURINARY: No problems with urination  Denies any hematuria or dysuria  + dark urine  NEUROLOGIC: No dizziness or vertigo, denies headaches  MUSCULOSKELETAL: Denies any muscle or joint pain  SKIN: Denies skin rashes or itching  ENDOCRINE: Denies excessive thirst  Denies intolerance to heat or cold  PSYCHOSOCIAL: Denies depression or anxiety  Denies any recent memory loss  Historical Information   Past Medical History:   Diagnosis Date    Hyperlipidemia     Hypertension     Pancreatitis      History reviewed   No pertinent surgical history  Social History   Social History     Substance and Sexual Activity   Alcohol Use Yes    Comment: "alot" 1 bottle of voldka lasts a week     Social History     Substance and Sexual Activity   Drug Use No     Social History     Tobacco Use   Smoking Status Current Every Day Smoker    Packs/day: 1 00    Types: Cigarettes   Smokeless Tobacco Never Used   Tobacco Comment    Pt stated he is ready to quit sometimes     History reviewed  No pertinent family history      Meds/Allergies     Medications Prior to Admission   Medication    atorvastatin (LIPITOR) 10 mg tablet    fenofibrate (TRICOR) 145 mg tablet    folic acid (FOLVITE) 1 mg tablet    lansoprazole (PREVACID) 30 mg capsule    lisinopril (ZESTRIL) 20 mg tablet    metoprolol tartrate (LOPRESSOR) 25 mg tablet    thiamine 100 MG tablet     Current Facility-Administered Medications   Medication Dose Route Frequency    acetaminophen (TYLENOL) tablet 650 mg  650 mg Oral Q6H PRN    aluminum-magnesium hydroxide-simethicone (MYLANTA) 200-200-20 mg/5 mL oral suspension 30 mL  30 mL Oral Q6H PRN    atorvastatin (LIPITOR) tablet 10 mg  10 mg Oral Daily With Dinner    fenofibrate (TRICOR) tablet 145 mg  145 mg Oral Daily    [START ON 8/4/6425] folic acid (FOLVITE) tablet 1 mg  1 mg Oral Daily    hydrALAZINE (APRESOLINE) injection 5 mg  5 mg Intravenous Q6H PRN    lactated ringers infusion  200 mL/hr Intravenous Continuous    lisinopril (ZESTRIL) tablet 20 mg  20 mg Oral Daily    metoprolol tartrate (LOPRESSOR) tablet 25 mg  25 mg Oral Q12H GARFIELD    morphine injection 2 mg  2 mg Intravenous Q4H PRN    multivitamin stress formula tablet 1 tablet  1 tablet Oral Daily    nicotine (NICODERM CQ) 14 mg/24hr TD 24 hr patch 1 patch  1 patch Transdermal Daily    ondansetron (ZOFRAN) injection 4 mg  4 mg Intravenous Q6H PRN    oxyCODONE (ROXICODONE) IR tablet 5 mg  5 mg Oral Q4H PRN    pantoprazole (PROTONIX) EC tablet 40 mg  40 mg Oral Early Morning    polyethylene glycol (MIRALAX) packet 17 g  17 g Oral Daily PRN    potassium chloride (K-DUR,KLOR-CON) CR tablet 40 mEq  40 mEq Oral Once    senna (SENOKOT) tablet 17 2 mg  2 tablet Oral BID    [START ON 7/1/2019] thiamine (VITAMIN B1) tablet 100 mg  100 mg Oral Daily       Allergies   Allergen Reactions    Other GI Intolerance     amlodipijne- benzepril           Objective     Blood pressure 159/100, pulse 98, temperature 98 4 °F (36 9 °C), temperature source Oral, resp  rate 18, height 6' 4" (1 93 m), weight 99 5 kg (219 lb 5 7 oz), SpO2 96 %  Intake/Output Summary (Last 24 hours) at 6/30/2019 1150  Last data filed at 6/30/2019 1040  Gross per 24 hour   Intake 1168 33 ml   Output    Net 1168 33 ml         PHYSICAL EXAM:      General Appearance:   Alert, cooperative, no distress, appears stated age    HEENT:   Normocephalic, atraumatic, anicteric, no oropharyngeal thrush present      Neck:  Supple, symmetrical, trachea midline, no adenopathy;    thyroid: no enlargement/tenderness/nodules; no carotid  bruit or JVD    Lungs:   Clear to auscultation bilaterally; no rales, rhonchi or wheezing; respirations unlabored    Heart[de-identified]   S1 and S2 normal; regular rate and rhythm; no murmur, rub, or gallop     Abdomen:   Soft, diffuse abdominal tenderness, worse in the upper abdomen non-distended; normal bowel sounds; no masses, no organomegaly    Genitalia:   Deferred    Rectal:   Deferred    Extremities:  No cyanosis, clubbing or edema    Pulses:  2+ and symmetric all extremities    Skin:  Skin color, texture, turgor normal, no rashes or lesions    Lymph nodes:  No palpable cervical, axillary or inguinal lymphadenopathy        Lab Results:   Results from last 7 days   Lab Units 06/30/19  0826   WBC Thousand/uL 12 21*   HEMOGLOBIN g/dL 14 5   HEMATOCRIT % 41 4   PLATELETS Thousands/uL 245   NEUTROS PCT % 94*   LYMPHS PCT % 2*   MONOS PCT % 3*   EOS PCT % 0     Results from last 7 days   Lab Units 06/30/19  0826   POTASSIUM mmol/L 2 8*   CHLORIDE mmol/L 93*   CO2 mmol/L 21   BUN mg/dL 7   CREATININE mg/dL 0 94   CALCIUM mg/dL 8 0*   ALK PHOS U/L 100   ALT U/L 25   AST U/L 22     Results from last 7 days   Lab Units 06/30/19  0826   INR  1 10     Results from last 7 days   Lab Units 06/30/19  0826   LIPASE u/L 1,114*       Imaging Studies: I have personally reviewed pertinent imaging studies  Xr Chest 2 Views    Result Date: 6/30/2019  Impression: No acute cardiopulmonary disease  Workstation performed: [id number]     Ct Abdomen Pelvis With Contrast    Result Date: 6/30/2019  Impression: Enlarging lesion of the head of the pancreas, set for follow-up in September  Otherwise there is subtle inflammation adjacent to the pancreas  It's difficult to say if this is due to ongoing pancreatitis, or merely a sequela of the previous pancreatitis  Workstation performed: [id number]           Patient was seen and examined by Dr Janie Watters  All dee medical decisions were made by Dr Janie Watters  Thank you for allowing us to participate in the care of this present patient  We will follow-up with you closely

## 2019-07-01 PROBLEM — R52 INTRACTABLE PAIN: Status: ACTIVE | Noted: 2019-07-01

## 2019-07-01 PROBLEM — E87.8 ELECTROLYTE ABNORMALITY: Status: ACTIVE | Noted: 2019-07-01

## 2019-07-01 LAB
ALBUMIN SERPL BCP-MCNC: 3 G/DL (ref 3.5–5)
ALP SERPL-CCNC: 80 U/L (ref 46–116)
ALT SERPL W P-5'-P-CCNC: 24 U/L (ref 12–78)
ANION GAP SERPL CALCULATED.3IONS-SCNC: 13 MMOL/L (ref 4–13)
AST SERPL W P-5'-P-CCNC: 24 U/L (ref 5–45)
ATRIAL RATE: 110 BPM
BASOPHILS # BLD MANUAL: 0 THOUSAND/UL (ref 0–0.1)
BASOPHILS NFR MAR MANUAL: 0 % (ref 0–1)
BILIRUB SERPL-MCNC: 0.6 MG/DL (ref 0.2–1)
BUN SERPL-MCNC: 4 MG/DL (ref 5–25)
CALCIUM SERPL-MCNC: 7.6 MG/DL (ref 8.3–10.1)
CHLORIDE SERPL-SCNC: 95 MMOL/L (ref 100–108)
CHOLEST SERPL-MCNC: 202 MG/DL (ref 50–200)
CO2 SERPL-SCNC: 22 MMOL/L (ref 21–32)
CREAT SERPL-MCNC: 0.66 MG/DL (ref 0.6–1.3)
EOSINOPHIL # BLD MANUAL: 0.1 THOUSAND/UL (ref 0–0.4)
EOSINOPHIL NFR BLD MANUAL: 1 % (ref 0–6)
ERYTHROCYTE [DISTWIDTH] IN BLOOD BY AUTOMATED COUNT: 13.2 % (ref 11.6–15.1)
GFR SERPL CREATININE-BSD FRML MDRD: 115 ML/MIN/1.73SQ M
GLUCOSE SERPL-MCNC: 113 MG/DL (ref 65–140)
HCT VFR BLD AUTO: 39.2 % (ref 36.5–49.3)
HDLC SERPL-MCNC: 38 MG/DL (ref 40–60)
HGB BLD-MCNC: 13.2 G/DL (ref 12–17)
LYMPHOCYTES # BLD AUTO: 0.49 THOUSAND/UL (ref 0.6–4.47)
LYMPHOCYTES # BLD AUTO: 5 % (ref 14–44)
MAGNESIUM SERPL-MCNC: 1.5 MG/DL (ref 1.6–2.6)
MCH RBC QN AUTO: 32.8 PG (ref 26.8–34.3)
MCHC RBC AUTO-ENTMCNC: 33.7 G/DL (ref 31.4–37.4)
MCV RBC AUTO: 97 FL (ref 82–98)
MONOCYTES # BLD AUTO: 0.99 THOUSAND/UL (ref 0–1.22)
MONOCYTES NFR BLD: 10 % (ref 4–12)
NEUTROPHILS # BLD MANUAL: 8.21 THOUSAND/UL (ref 1.85–7.62)
NEUTS BAND NFR BLD MANUAL: 4 % (ref 0–8)
NEUTS SEG NFR BLD AUTO: 79 % (ref 43–75)
NONHDLC SERPL-MCNC: 164 MG/DL
NRBC BLD AUTO-RTO: 0 /100 WBCS
P AXIS: 60 DEGREES
PLATELET # BLD AUTO: 208 THOUSANDS/UL (ref 149–390)
PLATELET BLD QL SMEAR: ADEQUATE
PMV BLD AUTO: 10 FL (ref 8.9–12.7)
POTASSIUM SERPL-SCNC: 3.4 MMOL/L (ref 3.5–5.3)
PR INTERVAL: 144 MS
PROCALCITONIN SERPL-MCNC: 13.99 NG/ML
PROT SERPL-MCNC: 7.1 G/DL (ref 6.4–8.2)
QRS AXIS: 15 DEGREES
QRSD INTERVAL: 92 MS
QT INTERVAL: 368 MS
QTC INTERVAL: 498 MS
RBC # BLD AUTO: 4.03 MILLION/UL (ref 3.88–5.62)
SODIUM SERPL-SCNC: 130 MMOL/L (ref 136–145)
T WAVE AXIS: 41 DEGREES
TOTAL CELLS COUNTED SPEC: 100
TRIGL SERPL-MCNC: 401 MG/DL
VARIANT LYMPHS # BLD AUTO: 1 %
VENTRICULAR RATE: 110 BPM
WBC # BLD AUTO: 9.89 THOUSAND/UL (ref 4.31–10.16)

## 2019-07-01 PROCEDURE — 94762 N-INVAS EAR/PLS OXIMTRY CONT: CPT

## 2019-07-01 PROCEDURE — 85027 COMPLETE CBC AUTOMATED: CPT | Performed by: INTERNAL MEDICINE

## 2019-07-01 PROCEDURE — 99254 IP/OBS CNSLTJ NEW/EST MOD 60: CPT | Performed by: INTERNAL MEDICINE

## 2019-07-01 PROCEDURE — 99232 SBSQ HOSP IP/OBS MODERATE 35: CPT | Performed by: INTERNAL MEDICINE

## 2019-07-01 PROCEDURE — 84145 PROCALCITONIN (PCT): CPT | Performed by: INTERNAL MEDICINE

## 2019-07-01 PROCEDURE — 94761 N-INVAS EAR/PLS OXIMETRY MLT: CPT

## 2019-07-01 PROCEDURE — 80053 COMPREHEN METABOLIC PANEL: CPT | Performed by: INTERNAL MEDICINE

## 2019-07-01 PROCEDURE — 94760 N-INVAS EAR/PLS OXIMETRY 1: CPT

## 2019-07-01 PROCEDURE — 83735 ASSAY OF MAGNESIUM: CPT | Performed by: INTERNAL MEDICINE

## 2019-07-01 PROCEDURE — 93010 ELECTROCARDIOGRAM REPORT: CPT | Performed by: INTERNAL MEDICINE

## 2019-07-01 PROCEDURE — 85007 BL SMEAR W/DIFF WBC COUNT: CPT | Performed by: INTERNAL MEDICINE

## 2019-07-01 PROCEDURE — 80061 LIPID PANEL: CPT | Performed by: INTERNAL MEDICINE

## 2019-07-01 PROCEDURE — C9113 INJ PANTOPRAZOLE SODIUM, VIA: HCPCS | Performed by: INTERNAL MEDICINE

## 2019-07-01 RX ORDER — LORAZEPAM 2 MG/ML
1 INJECTION INTRAMUSCULAR EVERY 4 HOURS PRN
Status: DISCONTINUED | OUTPATIENT
Start: 2019-07-01 | End: 2019-07-03 | Stop reason: HOSPADM

## 2019-07-01 RX ORDER — LORAZEPAM 2 MG/ML
2 INJECTION INTRAMUSCULAR ONCE
Status: COMPLETED | OUTPATIENT
Start: 2019-07-01 | End: 2019-07-01

## 2019-07-01 RX ORDER — MAGNESIUM SULFATE HEPTAHYDRATE 40 MG/ML
2 INJECTION, SOLUTION INTRAVENOUS ONCE
Status: COMPLETED | OUTPATIENT
Start: 2019-07-01 | End: 2019-07-01

## 2019-07-01 RX ORDER — POTASSIUM CHLORIDE 14.9 MG/ML
20 INJECTION INTRAVENOUS ONCE
Status: COMPLETED | OUTPATIENT
Start: 2019-07-01 | End: 2019-07-01

## 2019-07-01 RX ORDER — CLONIDINE 0.1 MG/24H
0.1 PATCH, EXTENDED RELEASE TRANSDERMAL WEEKLY
Status: DISCONTINUED | OUTPATIENT
Start: 2019-07-01 | End: 2019-07-03 | Stop reason: HOSPADM

## 2019-07-01 RX ADMIN — FOLIC ACID 1 MG: 5 INJECTION, SOLUTION INTRAMUSCULAR; INTRAVENOUS; SUBCUTANEOUS at 09:38

## 2019-07-01 RX ADMIN — HYDROMORPHONE HYDROCHLORIDE 1 MG: 1 INJECTION, SOLUTION INTRAMUSCULAR; INTRAVENOUS; SUBCUTANEOUS at 03:20

## 2019-07-01 RX ADMIN — SODIUM CHLORIDE, SODIUM GLUCONATE, SODIUM ACETATE, POTASSIUM CHLORIDE AND MAGNESIUM CHLORIDE 250 ML/HR: 526; 502; 368; 37; 30 INJECTION, SOLUTION INTRAVENOUS at 18:28

## 2019-07-01 RX ADMIN — THIAMINE HYDROCHLORIDE 100 MG: 100 INJECTION, SOLUTION INTRAMUSCULAR; INTRAVENOUS at 10:25

## 2019-07-01 RX ADMIN — SODIUM CHLORIDE, SODIUM GLUCONATE, SODIUM ACETATE, POTASSIUM CHLORIDE AND MAGNESIUM CHLORIDE 250 ML/HR: 526; 502; 368; 37; 30 INJECTION, SOLUTION INTRAVENOUS at 23:37

## 2019-07-01 RX ADMIN — CEFEPIME HYDROCHLORIDE 1000 MG: 1 INJECTION, POWDER, FOR SOLUTION INTRAMUSCULAR; INTRAVENOUS at 13:02

## 2019-07-01 RX ADMIN — ENALAPRILAT 1.25 MG: 1.25 INJECTION INTRAVENOUS at 03:20

## 2019-07-01 RX ADMIN — ONDANSETRON 4 MG: 2 INJECTION INTRAMUSCULAR; INTRAVENOUS at 19:49

## 2019-07-01 RX ADMIN — METOPROLOL TARTRATE 5 MG: 5 INJECTION, SOLUTION INTRAVENOUS at 12:00

## 2019-07-01 RX ADMIN — HYDRALAZINE HYDROCHLORIDE 15 MG: 20 INJECTION INTRAMUSCULAR; INTRAVENOUS at 16:21

## 2019-07-01 RX ADMIN — LORAZEPAM 1 MG: 2 INJECTION INTRAMUSCULAR; INTRAVENOUS at 19:49

## 2019-07-01 RX ADMIN — ENALAPRILAT 1.25 MG: 1.25 INJECTION INTRAVENOUS at 16:19

## 2019-07-01 RX ADMIN — LORAZEPAM 2 MG: 2 INJECTION, SOLUTION INTRAMUSCULAR; INTRAVENOUS at 10:25

## 2019-07-01 RX ADMIN — CLONIDINE 0.1 MG: 0.1 PATCH TRANSDERMAL at 12:08

## 2019-07-01 RX ADMIN — POTASSIUM CHLORIDE 20 MEQ: 200 INJECTION, SOLUTION INTRAVENOUS at 14:00

## 2019-07-01 RX ADMIN — METOPROLOL TARTRATE 5 MG: 5 INJECTION, SOLUTION INTRAVENOUS at 00:43

## 2019-07-01 RX ADMIN — HYDROMORPHONE HYDROCHLORIDE 1 MG: 1 INJECTION, SOLUTION INTRAMUSCULAR; INTRAVENOUS; SUBCUTANEOUS at 05:20

## 2019-07-01 RX ADMIN — SODIUM CHLORIDE, SODIUM GLUCONATE, SODIUM ACETATE, POTASSIUM CHLORIDE AND MAGNESIUM CHLORIDE 250 ML/HR: 526; 502; 368; 37; 30 INJECTION, SOLUTION INTRAVENOUS at 05:23

## 2019-07-01 RX ADMIN — ENALAPRILAT 1.25 MG: 1.25 INJECTION INTRAVENOUS at 20:58

## 2019-07-01 RX ADMIN — HYDROMORPHONE HYDROCHLORIDE: 10 INJECTION, SOLUTION INTRAMUSCULAR; INTRAVENOUS; SUBCUTANEOUS at 07:45

## 2019-07-01 RX ADMIN — NICOTINE 1 PATCH: 14 PATCH TRANSDERMAL at 07:02

## 2019-07-01 RX ADMIN — METRONIDAZOLE 500 MG: 500 INJECTION, SOLUTION INTRAVENOUS at 12:04

## 2019-07-01 RX ADMIN — METOPROLOL TARTRATE 5 MG: 5 INJECTION, SOLUTION INTRAVENOUS at 18:17

## 2019-07-01 RX ADMIN — ONDANSETRON 4 MG: 2 INJECTION INTRAMUSCULAR; INTRAVENOUS at 09:38

## 2019-07-01 RX ADMIN — MAGNESIUM SULFATE HEPTAHYDRATE 2 G: 40 INJECTION, SOLUTION INTRAVENOUS at 14:00

## 2019-07-01 RX ADMIN — HYDROMORPHONE HYDROCHLORIDE 1 MG: 1 INJECTION, SOLUTION INTRAMUSCULAR; INTRAVENOUS; SUBCUTANEOUS at 06:59

## 2019-07-01 RX ADMIN — LABETALOL 20 MG/4 ML (5 MG/ML) INTRAVENOUS SYRINGE 10 MG: at 04:09

## 2019-07-01 RX ADMIN — ENALAPRILAT 1.25 MG: 1.25 INJECTION INTRAVENOUS at 08:37

## 2019-07-01 RX ADMIN — HYDROMORPHONE HYDROCHLORIDE 1 MG: 1 INJECTION, SOLUTION INTRAMUSCULAR; INTRAVENOUS; SUBCUTANEOUS at 00:42

## 2019-07-01 RX ADMIN — PANTOPRAZOLE SODIUM 40 MG: 40 INJECTION, POWDER, FOR SOLUTION INTRAVENOUS at 08:37

## 2019-07-01 RX ADMIN — NICOTINE 1 PATCH: 14 PATCH TRANSDERMAL at 08:05

## 2019-07-01 RX ADMIN — METOPROLOL TARTRATE 5 MG: 5 INJECTION, SOLUTION INTRAVENOUS at 23:46

## 2019-07-01 RX ADMIN — SODIUM CHLORIDE, SODIUM GLUCONATE, SODIUM ACETATE, POTASSIUM CHLORIDE AND MAGNESIUM CHLORIDE 250 ML/HR: 526; 502; 368; 37; 30 INJECTION, SOLUTION INTRAVENOUS at 00:16

## 2019-07-01 RX ADMIN — ONDANSETRON 4 MG: 2 INJECTION INTRAMUSCULAR; INTRAVENOUS at 00:16

## 2019-07-01 NOTE — ASSESSMENT & PLAN NOTE
· On the last admission, patient had a triglyceride level more than 1,000  · Repeat level improved  · Continue patient's atorvastatin and Tricor Rosella Goldmann

## 2019-07-01 NOTE — ASSESSMENT & PLAN NOTE
· Patient was just admitted about 2 weeks ago due to acute uncomplicated alcoholic pancreatitis, with hypertriglyceridemia  · Patient came back today with nausea, vomiting as well as abdominal and bilateral pains  · Patient had some beers twice last week, as he claims  · Patient again was found out to have acute pancreatitis  · IV fluids/NPO/  · Pain control  · Clear liquid diet in the meantime  · GI consult  · Much improved triglyceride [id number]  Continue patient's atorvastatin and tricor  Subtle inflammation in the pancreas on CT scan  · Importance of alcohol cessation counseling stressed out to the patient  Please see management and plans under alcohol abuse

## 2019-07-01 NOTE — ASSESSMENT & PLAN NOTE
Pain distress contributing  BP remains high despite Dilaudid pump  Will add ativan for anxiety and clonidine patch  CC team made aware  May need IV drip for BP control if BP no improvement  Continue IV metoprolol/Vasotec ATC   Resume home PO meds when able to take PO

## 2019-07-01 NOTE — PROGRESS NOTES
Progress Note - Deon Landry 53 y o  male MRN: [id number]    Unit/Bed#: -01 Encounter: [id number]    Assessment and Plan:   Principal Problem:    Recurrent acute pancreatitis  Active Problems:    Hyponatremia    GERD (gastroesophageal reflux disease)    Mixed hyperlipidemia    Tobacco dependence    ETOH abuse    Hypokalemia    SIRS (systemic inflammatory response syndrome) (HCC)    Pancreatic lesion    Constipation    Abnormal urinalysis    Acute recurrent pancreatitis   -initial episode about 6 weeks prior with heavy alcohol use and high trilglycerides (400 yesterday compared to >1000 2 weeks ago)  He had a few drinks prior to this admission  -with new likely pseudocyst on imaging yesterday  -He has no white count or fever, he has been persistently hypertensive felt to be secondary to pain  -continue NPO   -continue IV fluids and pain management  -If clinical worsening would recommend repeat CT to rule out evidence of necrosis or worsening inflammation  -He will need an outpatient MRI in about 6-8 weeks to assess for decreasing size of pseudocyst      ----------------------------------------------------------------------------------------------------------------    Subjective:     Patient still has abdominal pain, rates as 7/10 currently  He has not had a bm or passed gas  He has been persistently hypertensive    Objective:     Vitals: Blood pressure (!) 210/110, pulse 102, temperature 98 7 °F (37 1 °C), temperature source Oral, resp  rate 18, height 6' 4" (1 93 m), weight 99 5 kg (219 lb 5 7 oz), SpO2 95 %  ,Body mass index is 26 7 kg/m²        Intake/Output Summary (Last 24 hours) at 7/1/2019 1110  Last data filed at 7/1/2019 1015  Gross per 24 hour   Intake 4650 ml   Output 1750 ml   Net 2900 ml       Physical Exam:   General Appearance: Alert, appears stated age and cooperative  Lungs: Clear to auscultation bilaterally  Heart: Regular rate and rhythm  Abdomen: Soft, moderately diffusely tender, non-distended; bowel sounds normal  Extremities: No edema    Invasive Devices     Peripheral Intravenous Line            Peripheral IV 06/30/19 Right Antecubital 1 day    Peripheral IV 07/01/19 Left;Proximal;Ventral (anterior) Forearm less than 1 day                Lab Results:  Results from last 7 days   Lab Units 07/01/19  0435 06/30/19  0826   WBC Thousand/uL 9 89 12 21*   HEMOGLOBIN g/dL 13 2 14 5   HEMATOCRIT % 39 2 41 4   PLATELETS Thousands/uL 208 245   NEUTROS PCT %  --  94*   LYMPHS PCT %  --  2*   LYMPHO PCT % 5*  --    MONOS PCT %  --  3*   MONO PCT % 10  --    EOS PCT % 1 0     Results from last 7 days   Lab Units 07/01/19  0435   POTASSIUM mmol/L 3 4*   CHLORIDE mmol/L 95*   CO2 mmol/L 22   BUN mg/dL 4*   CREATININE mg/dL 0 66   CALCIUM mg/dL 7 6*   ALK PHOS U/L 80   ALT U/L 24   AST U/L 24     Results from last 7 days   Lab Units 06/30/19  0826   INR  1 10     Results from last 7 days   Lab Units 06/30/19  0826   LIPASE u/L 1,114*       Imaging Studies: I have personally reviewed pertinent imaging studies  Xr Chest 2 Views    Result Date: 6/30/2019  Impression: No acute cardiopulmonary disease  Workstation performed: [id number]     Us Right Upper Quadrant    Result Date: 6/30/2019  Impression: 1  Normal gallbladder  2   Enlarged echogenic liver likely due to hepatic steatosis  3   Enlarging pancreatic head cyst, possibly a pseudocyst given acute inflammatory changes  Continued surveillance recommended as per previous studies  Workstation performed: [id number]     Ct Abdomen Pelvis With Contrast    Result Date: 6/30/2019  Impression: Enlarging lesion of the head of the pancreas, set for follow-up in September  Otherwise there is subtle inflammation adjacent to the pancreas  It's difficult to say if this is due to ongoing pancreatitis, or merely a sequela of the previous pancreatitis   Workstation performed: [id number]

## 2019-07-01 NOTE — PLAN OF CARE
Problem: GASTROINTESTINAL - ADULT  Goal: Minimal or absence of nausea and/or vomiting  Description  INTERVENTIONS:  - Administer IV fluids as ordered to ensure adequate hydration  - Maintain NPO status until nausea and vomiting are resolved  - Nasogastric tube as ordered  - Administer ordered antiemetic medications as needed  - Provide nonpharmacologic comfort measures as appropriate  - Advance diet as tolerated, if ordered  - Nutrition services referral to assist patient with adequate nutrition and appropriate food choices  Outcome: Progressing  Goal: Maintains or returns to baseline bowel function  Description  INTERVENTIONS:  - Assess bowel function  - Encourage oral fluids to ensure adequate hydration  - Administer IV fluids as ordered to ensure adequate hydration  - Administer ordered medications as needed  - Encourage mobilization and activity  - Nutrition services referral to assist patient with appropriate food choices  Outcome: Progressing  Goal: Maintains adequate nutritional intake  Description  INTERVENTIONS:  - Monitor percentage of each meal consumed  - Identify factors contributing to decreased intake, treat as appropriate  - Assist with meals as needed  - Monitor I&O, WT and lab values  - Obtain nutrition services referral as needed  Outcome: Progressing

## 2019-07-01 NOTE — UTILIZATION REVIEW
Initial Clinical Review    Admission: Date/Time/Statement: 6/30/19 @ 1022     Orders Placed This Encounter   Procedures    Inpatient Admission (expected length of stay for this patient Order details is greater than two midnights)     Standing Status:   Standing     Number of Occurrences:   1     Order Specific Question:   Admitting Physician     Answer:   Delmar Contreras [[id number]]     Order Specific Question:   Level of Care     Answer:   Med Surg [[id number]]     Order Specific Question:   Estimated length of stay     Answer:   More than 2 Midnights     Order Specific Question:   Certification     Answer:   I certify that inpatient services are medically necessary for this patient for a duration of greater than two midnights  See H&P and MD Progress Notes for additional information about the patient's course of treatment  ED Arrival Information     Expected Arrival Acuity Means of Arrival Escorted By Service Admission Type    - 6/30/2019 07:58 Emergent Walk-In Self Hospitalist Emergency    Arrival Complaint    -        Chief Complaint   Patient presents with    Flank Pain     Patient reports feeling b/l flank pain and abdominal pain that started Friday after work  Nausea present  Assessment/Plan:  this is a 53year old male from home to ED admitted as inpatient due to [address]  Patient presented with bilateral flank pain, nausea and bilious vomiting since 6/28  Feels shaky the past 2 days and did drink a beer on each day  On exam tachycardic, tender in the epigastric and periumbilical area  Tremor  Has leukocytosis of 12 21 and blood cultures done,  Lipase of 1114 and CT showing inflammation adjacent to pancreas and enlarging lesion    GI consulted  Hypokalemic at 2 8 and replaced  IVF, CIWA protocol, monitoring of electrolytes and renal function in progress    Patient hypertensive and antihypertensives in progress, pain control in progress,   Per GI - Acute recurrent pancreatitis with new developing pseudocyst:  Likely 2nd alcohol as well as possible hypertriglyceridemia    On 7/1/2019 has persistent pain and hypertension  BP are secondary to uncontrolled pain and PCA Dilaudid ordered  Scheduled IV antihypertensive and beta blocker to continue with prn medication as needed     ED Triage Vitals   Temperature Pulse Respirations Blood Pressure SpO2   06/30/19 0811 06/30/19 0808 06/30/19 0808 06/30/19 0808 06/30/19 0808   98 8 °F (37 1 °C) (!) 117 20 (!) 195/117 96 %      Temp Source Heart Rate Source Patient Position - Orthostatic VS BP Location FiO2 (%)   06/30/19 0811 06/30/19 0808 06/30/19 0853 06/30/19 0808 --   Oral Monitor Lying Right arm       Pain Score       06/30/19 0853       8        Wt Readings from Last 1 Encounters:   06/30/19 99 5 kg (219 lb 5 7 oz)     Additional Vital Signs:  07/01/19 0326        206/106Abnormal         Lying   07/01/19 0042    109Abnormal     168/86           06/30/19 2328        208/110Abnormal         Sitting   06/30/19 2200  98 9 °F (37 2 °C)  103  20  188/82Abnormal     97 %  None (Room air)  Lying   06/30/19 2149        218/102Abnormal         Lying   06/30/19 1955    97    178/98Abnormal         Lying   06/30/19 1910        214/110Abnormal         Lying   06/30/19 1849        210/110Abnormal            06/30/19 1800  98 7 °F (37 1 °C)  92  20  210/110Abnormal     98 %  None (Room air)  Lying   06/30/19 1659    92    182/120Abnormal            06/30/19 1600    110Abnormal     230/135Abnormal            06/30/19 1500  98 1 °F (36 7 °C)  95  20  218/122Abnormal     96 %  None (Room air)  Lying   06/30/19 1400    95  18  198/102Abnormal     97 %  None (Room air)  Lying   06/30/19 1117  98 4 °F (36 9 °C)  98  18  159/100  124  96 %  None (Room air)  Lying   06/30/19 1041    100  19  161/102Abnormal     98 %  None (Room air)  Lying   06/30/19 0917              None (Room air)   06/30/19 0853    108Abnormal   18  171/101Abnormal     95 %  None (Room air)         Pertinent Labs/Diagnostic Test Results:   6/30/2019  US RUQ- Normal gallbladder  2   Enlarged echogenic liver likely due to hepatic steatosis  3   Enlarging pancreatic head cyst, possibly a pseudocyst given acute inflammatory changes    6/30/2019-  CT abdomen - Enlarging lesion of the head of the pancreas, set for follow-up in September  Otherwise there is subtle inflammation adjacent to the pancreas   It's difficult to say if this is due to ongoing pancreatitis, or merely a sequela of the previous pancreatitis      6/30/2019 CxR- No acute cardiopulmonary disease  Results from last 7 days   Lab Units 07/01/19  0435 06/30/19  0826   WBC Thousand/uL 9 89 12 21*   HEMOGLOBIN g/dL 13 2 14 5   HEMATOCRIT % 39 2 41 4   PLATELETS Thousands/uL 208 245   NEUTROS ABS Thousands/µL  --  11 55*   BANDS PCT % 4  --          Results from last 7 days   Lab Units 07/01/19  0435 06/30/19  0826   SODIUM mmol/L 130* 131*   POTASSIUM mmol/L 3 4* 2 8*   CHLORIDE mmol/L 95* 93*   CO2 mmol/L 22 21   ANION GAP mmol/L 13 17*   BUN mg/dL 4* 7   CREATININE mg/dL 0 66 0 94   EGFR ml/min/1 73sq m 115 96   CALCIUM mg/dL 7 6* 8 0*   MAGNESIUM mg/dL 1 5*  --      Results from last 7 days   Lab Units 07/01/19  0435 06/30/19  0826   AST U/L 24 22   ALT U/L 24 25   ALK PHOS U/L 80 100   TOTAL PROTEIN g/dL 7 1 7 9   ALBUMIN g/dL 3 0* 3 8   TOTAL BILIRUBIN mg/dL 0 60 1 60*         Results from last 7 days   Lab Units 07/01/19  0435 06/30/19  0826   GLUCOSE RANDOM mg/dL 113 132     Results from last 7 days   Lab Units 06/30/19  0826   PROTIME seconds 13 6   INR  1 10   PTT seconds 33     Results from last 7 days   Lab Units 07/01/19  0435   PROCALCITONIN ng/ml 13 99*     Results from last 7 days   Lab Units 06/30/19  0826   LACTIC ACID mmol/L 0 6     Results from last 7 days   Lab Units 06/30/19  0826   LIPASE u/L 1,114*     Results from last 7 days   Lab Units 06/30/19  1014   CLARITY UA  Clear   COLOR UA  Yellow   SPEC GRAV UA  1 010   PH UA  6 5   GLUCOSE UA mg/dl Negative   KETONES UA mg/dl 40 (2+)*   BLOOD UA  Moderate*   PROTEIN UA mg/dl 30 (1+)*   NITRITE UA  Negative   BILIRUBIN UA  Negative   UROBILINOGEN UA E U /dl 1 0   LEUKOCYTES UA  Negative   WBC UA /hpf 0-1*   RBC UA /hpf 2-4*   BACTERIA UA /hpf Occasional   EPITHELIAL CELLS WET PREP /hpf Occasional     Results from last 7 days   Lab Units 06/30/19  0826   ETHANOL LVL mg/dL <3     ED Treatment:   Medication Administration from 06/30/2019 0758 to 06/30/2019 1117       Date/Time Order Dose Route Action Comments     06/30/2019 0828 sodium chloride 0 9 % bolus 1,000 mL 1,000 mL Intravenous New Bag      06/30/2019 0848 ondansetron (ZOFRAN) injection 4 mg 4 mg Intravenous Given      06/30/2019 0854 morphine (PF) 4 mg/mL injection 4 mg 4 mg Intravenous Given      06/30/2019 0851 LORazepam (ATIVAN) 2 mg/mL injection 1 mg 1 mg Intravenous Given      06/30/2019 0856 thiamine (VITAMIN B1) 100 mg in sodium chloride 0 9 % 50 mL IVPB 100 mg Intravenous New Bag      88/58/8063 3503 folic acid 1 mg in sodium chloride 0 9 % 50 mL IVPB 1 mg Intravenous New Bag      06/30/2019 0849 ketorolac (TORADOL) injection 15 mg 15 mg Intravenous Given      06/30/2019 0948 potassium chloride (K-DUR,KLOR-CON) CR tablet 40 mEq 40 mEq Oral Given      06/30/2019 1045 morphine injection 2 mg 2 mg Intravenous Given         Past Medical History:   Diagnosis Date    Hyperlipidemia     Hypertension     Pancreatitis    admit 6/10/2019- 6/12/2019 Pancreatic cyst    Present on Admission:   Recurrent acute pancreatitis   Tobacco dependence   SIRS (systemic inflammatory response syndrome) (HCC)   Hyponatremia   Hypokalemia   Mixed hyperlipidemia   GERD (gastroesophageal reflux disease)   ETOH abuse   Pancreatic lesion   Constipation   Abnormal urinalysis      Admitting Diagnosis: Recurrent acute pancreatitis [K85 90]  Hypokalemia [E87 6]  Back pain [M54 9]  Hyponatremia [E87 1]  Pancreatic cyst [K86 2]  Pancreatitis [K85 90]  Age/Sex: 53 y o  male  Admission Orders:  6/30/2019  1023 INPATIENT     Current Facility-Administered Medications:  acetaminophen 650 mg Oral Q6H PRN    aluminum-magnesium hydroxide-simethicone 30 mL Oral Q6H PRN    atorvastatin 10 mg Oral Daily With Dinner    enalaprilat 1 25 mg Intravenous Q6H    fenofibrate 145 mg Oral Daily    folic acid IVPB 1 mg Intravenous Daily Last Rate: Stopped (07/01/19 1035)   hydrALAZINE - used x 1 15 mg Intravenous Q4H PRN 2330   HYDROmorphone  Intravenous Continuous    HYDROmorphone - used x 6 on 6/20 and x 5 on 7/1 changed to PCA 1 mg Intravenous Q1H PRN    metoprolol 5 mg Intravenous Q6H [address]    multi-electrolyte 250 mL/hr Intravenous Continuous Last Rate: Stopped (07/01/19 0942)   multivitamin stress formula 1 tablet Oral Daily    naloxone 0 04 mg Intravenous Q3 min PRN    nicotine 1 patch Transdermal Daily    Ondansetron - used x 3  4 mg Intravenous Q6H PRN 1350, 0016, 0938   pantoprazole 40 mg Intravenous Q24H [place]    polyethylene glycol 17 g Oral Daily PRN    senna 2 tablet Oral BID    thiamine 100 mg Intravenous Daily Last Rate: 100 mg (07/01/19 1025)   Labetalol HCl (NORMODYNE) injection 10 mg  - used x 2 (1928, 0403)  Dose: 10 mg  Freq: Every 4 hours PRN Route: IV  PRN Reason: high blood pressure  PRN Comment: SBP >180, hold for HR <60      IP CONSULT TO GASTROENTEROLOGY   CIWA protocol  Seizure precautions  NPO      Network Utilization Review Department  Phone: [phone]; Fax [phone]  [email or web address]  ATTENTION: Please call with any questions or concerns to [phone]  and carefully listen to the prompts so that you are directed to the right person  Send all requests for admission clinical reviews, approved or denied determinations and any other requests to fax [phone]   All voicemails are confidential

## 2019-07-01 NOTE — PROGRESS NOTES
At 0326, pt /106  Pt having 8/10 pain  SLIM notified  Dilaudid administered  Scheduled vasotec given  At 0401, rechecked /96  PRN labetolol administered  Pt still having 8/10 pain  SLIM paged  Will continue to monitor

## 2019-07-01 NOTE — ASSESSMENT & PLAN NOTE
· This was also found on patient's previous CT scan and MRI on the last admission, however, on today's CT scan, the lesion is enlarging  · Likely representing a pseudocyst   Given elevated procalcitonin level will need to rule out infection  Will start empiric IV antibiotics and appreciate Infectious Disease input  ?  MRI

## 2019-07-01 NOTE — PROGRESS NOTES
Progress Note - Tawana Paget 1970, 53 y o  male MRN: [id number]    Unit/Bed#: -01 Encounter: [id number]    Primary Care Provider: Andrew Garcia MD   Date and time admitted to hospital: 6/30/2019  8:07 AM        * Recurrent acute pancreatitis  Assessment & Plan  · Patient was just admitted about 2 weeks ago due to acute uncomplicated alcoholic pancreatitis, with hypertriglyceridemia  · Patient came back today with nausea, vomiting as well as abdominal and bilateral pains  · Patient had some beers twice last week, as he claims  · Patient again was found out to have acute pancreatitis  · IV fluids/NPO/  · Pain control  · Clear liquid diet in the meantime  · GI consult  · Much improved triglyceride [id number]  Continue patient's atorvastatin and tricor  Subtle inflammation in the pancreas on CT scan  · Importance of alcohol cessation counseling stressed out to the patient  Please see management and plans under alcohol abuse  Pancreatic lesion  Assessment & Plan  · This was also found on patient's previous CT scan and MRI on the last admission, however, on today's CT scan, the lesion is enlarging  · Likely representing a pseudocyst   Given elevated procalcitonin level will need to rule out infection  Will start empiric IV antibiotics and appreciate Infectious Disease input  ? MRI       Intractable pain  Assessment & Plan  Started dilaudid PCA pump this morning due to intractable pain  Secondary to above    Hypertensive urgency  Assessment & Plan  Pain distress contributing  BP remains high despite Dilaudid pump  Will add ativan for anxiety and clonidine patch  CC team made aware  May need IV drip for BP control if BP no improvement  Continue IV metoprolol/Vasotec ATC  Resume home PO meds when able to take PO    ETOH abuse  Assessment & Plan  · Patient drank beer twice last week/a few days ago    · Patient was counseled regarding importance of alcohol cessation to prevent recurrence of pancreatitis  · Possibility of meeting our HOST staff and possible alcohol rehab were offered on admission  · Ct CIWA protocol  · Continue patient's thiamine and folate  Mixed hyperlipidemia  Assessment & Plan  · On the last admission, patient had a triglyceride level more than 1,000  · Repeat level improved  · Continue patient's atorvastatin and Tricor       Abnormal urinalysis  Assessment & Plan  · With findings of microscopic hematuria as well as proteinuria  · Will need outpatient workup if not yet done  Follow-up with primary care physician  May need a nephrology visit in the outpatient  Electrolyte abnormality  Assessment & Plan  Replete K and mag         Mechanical VTE Prophylaxis in Place: Yes    Patient Centered Rounds: I have performed bedside rounds with nursing staff today  Discussions with Specialists or Other Care Team Provider: GI    Education and Discussions with Family / Patient: Yes    Time Spent for Care: 20 minutes  More than 50% of total time spent on counseling and coordination of care as described above  Current Length of Stay: 1 day(s)    Current Patient Status: Inpatient   Certification Statement: The patient will continue to require additional inpatient hospital stay due to above    Discharge Plan / Estimated Discharge Date: Not ready for discharge    Code Status: Level 1 - Full Code      Subjective:   Event noted  Pt was upgraded to SD2 bed early this morning due to high BP and intractable pain for which dilaudid PCA pump was started  C/O nausea and vomited once    Objective:     Vitals:   Temp (24hrs), Av 5 °F (36 9 °C), Min:97 8 °F (36 6 °C), Max:98 9 °F (37 2 °C)    Temp:  [97 8 °F (36 6 °C)-98 9 °F (37 2 °C)] 98 6 °F (37 °C)  HR:  [] 109  Resp:  [18-20] 18  BP: (168-230)/() 200/90  SpO2:  [93 %-98 %] 97 %  Body mass index is 26 7 kg/m²  Input and Output Summary (last 24 hours):        Intake/Output Summary (Last 24 hours) at 2019 [address] filed at 7/1/2019 1015  Gross per 24 hour   Intake 4650 ml   Output 1750 ml   Net 2900 ml       Physical Exam:     Physical Exam   Constitutional: He is oriented to person, place, and time  He appears distressed  Cardiovascular: Normal rate and regular rhythm  Pulmonary/Chest: Effort normal and breath sounds normal  No respiratory distress  Abdominal: Soft  Diffuse tenderness   Musculoskeletal: He exhibits no edema  Neurological: He is alert and oriented to person, place, and time  Skin: Skin is warm  He is not diaphoretic  Psychiatric: He has a normal mood and affect  Additional Data:     Labs:    Results from last 7 days   Lab Units 07/01/19  0435 06/30/19  0826   WBC Thousand/uL 9 89 12 21*   HEMOGLOBIN g/dL 13 2 14 5   HEMATOCRIT % 39 2 41 4   PLATELETS Thousands/uL 208 245   NEUTROS PCT %  --  94*   LYMPHS PCT %  --  2*   LYMPHO PCT % 5*  --    MONOS PCT %  --  3*   MONO PCT % 10  --    EOS PCT % 1 0     Results from last 7 days   Lab Units 07/01/19  0435   POTASSIUM mmol/L 3 4*   CHLORIDE mmol/L 95*   CO2 mmol/L 22   BUN mg/dL 4*   CREATININE mg/dL 0 66   CALCIUM mg/dL 7 6*   ALK PHOS U/L 80   ALT U/L 24   AST U/L 24     Results from last 7 days   Lab Units 06/30/19  0826   INR  1 10         Recent Cultures (last 7 days):     Results from last 7 days   Lab Units 06/30/19  0845 06/30/19  0826   BLOOD CULTURE  No Growth at 24 hrs  No Growth at 24 hrs         Last 24 Hours Medication List:     Current Facility-Administered Medications:  acetaminophen 650 mg Oral Q6H PRN Carlos Meredith MD    aluminum-magnesium hydroxide-simethicone 30 mL Oral Q6H PRN Carlos eMredith MD    atorvastatin 10 mg Oral Daily With Dinner Carlos Meredith MD    cefepime 1,000 mg Intravenous Q12H Livia Hernandez MD    cloNIDine 0 1 mg Transdermal Weekly Livia Hernandez MD    enalaprilat 1 25 mg Intravenous Q6H Carlos Meredith MD    fenofibrate 145 mg Oral Daily Nasreen Lemus MD folic acid IVPB 1 mg Intravenous Daily Carlos Meredith MD Last Rate: Stopped (07/01/19 1035)   hydrALAZINE 15 mg Intravenous Q4H PRN Jeni Paz PA-C    HYDROmorphone  Intravenous Continuous Jeni Paz PA-C    HYDROmorphone 1 mg Intravenous Q1H PRN Jeni Paz PA-C    magnesium sulfate 2 g Intravenous Once Tomás Chicas MD Last Rate: 2 g (07/01/19 1156)   metoprolol 5 mg Intravenous Q6H [address] Carlos Meredith MD    metroNIDAZOLE 500 mg Intravenous Q8H Tomás Chicas MD Last Rate: 500 mg (07/01/19 1204)   multi-electrolyte 250 mL/hr Intravenous Continuous Jeni Paz PA-C Last Rate: Stopped (07/01/19 1226)   multivitamin stress formula 1 tablet Oral Daily Carlos Meredith MD    naloxone 0 04 mg Intravenous Q3 min PRN Jeni Paz PA-C    nicotine 1 patch Transdermal Daily Carlos Meredith MD    ondansetron 4 mg Intravenous Q6H PRN Carlos Meredith MD    pantoprazole 40 mg Intravenous Q24H [address] Carlos Meredith MD    polyethylene glycol 17 g Oral Daily PRN Carlos Meredith MD    potassium chloride 20 mEq Intravenous Once Tomás Chicas MD Last Rate: 20 mEq (07/01/19 1153)   senna 2 tablet Oral BID Carlos Meredith MD    thiamine 100 mg Intravenous Daily Carlos Meredith MD Last Rate: Stopped (07/01/19 1115)        Today, Patient Was Seen By: Tomás Chicas MD    ** Please Note: This note has been constructed using a voice recognition system   **

## 2019-07-01 NOTE — CONSULTS
Consultation - Infectious Disease   Wen Tenorio 53 y o  male MRN: [id number]  Unit/Bed#: -01 Encounter: [id number]      IMPRESSION & RECOMMENDATIONS:   1  Acute pancreatitis-recurrent  Suspect secondary to ongoing alcohol use  Perhaps hypertriglyceridemia is also playing a role  The patient is now forming a pseudocyst   No clear clinical evidence of an active infectious process at this time  There is no clinical indication for antibiotic treatment of acute pancreatitis  The procalcitonin level is elevated, however pancreatitis is a known cause of false-positive test for procalcitonin  The patient remains afebrile and the white cell count has normalized despite no antibiotics   -discontinue cefepime and Flagyl  -monitor off all antibiotics  -supportive care  -repeat CT the abdomen and pelvis as needed  -close GI follow-up  -pain management    2  Pancreatic pseudocyst-as a complication of acute pancreatitis  While pseudocysts can become infected, there is currently no clinical evidence of an infected pancreatic pseudocyst at this time  Patient remains afebrile and without leukocytosis  -no antibiotics for now  -serial exams  -if the patient would begin spiking fever, would recommend repeat CT the abdomen and pelvis and possible aspiration of the pancreatic pseudocyst to make sure it is not become secondarily infected  -GI follow-up    3  Alcohol abuse-the patient continues to drink alcohol despite the recent pancreatitis  -complete alcohol abstinence  -monitor for withdrawal with CIWA protocol  -thiamine and folate    4   Hyponatremia and hypokalemia-suspect related to electrolyte depletion and perhaps SIADH playing a role   -electrolyte replacement  -monitor volume status  -recheck BMP    Discussed the above plan with the primary service in detail    HISTORY OF PRESENT ILLNESS:  Reason for Consult:  Acute pancreatitis with new pancreatic pseudocyst  HPI: Wen Tenorio is a 53y o  year old male with a history of acute pancreatitis admitted [facility] with worsening abdominal and back pain I am asked to assist with antibiotic management  Patient had been admitted to [facility] approximately 2 weeks ago with acute pancreatitis felt secondary to alcohol and hypertriglyceridemia  He was eventually discharged home with improvement  However he apparently still drank a few beers in the last week  Two days prior to admission he started having bilateral flank pain with some nausea and vomiting  The symptoms persisted and progressed on to the anterior abdomen and therefore the patient came to the ER for further evaluation  In the emergency department he underwent CT of the abdomen and pelvis which suggested the possibility of forming pseudocyst in some inflammatory changes of the pancreas  He admits to also having some chills and sweats but no documented fever  On his initial presentation he had a mild leukocytosis but was afebrile  During the patient's brief hospital stay he has remained afebrile and hemodynamically stable  He denies any diarrhea, denies any dysuria or hematuria, denies any cough or shortness of breath  REVIEW OF SYSTEMS:  A complete 12 point system-based review of systems is negative other than that noted in the HPI  PAST MEDICAL HISTORY:  Past Medical History:   Diagnosis Date    Hyperlipidemia     Hypertension     Pancreatitis      History reviewed  No pertinent surgical history      FAMILY HISTORY:  Non-contributory    SOCIAL HISTORY:  Social History   Social History     Substance and Sexual Activity   Alcohol Use Yes    Comment: "alot" 1 bottle of voldka lasts a week     Social History     Substance and Sexual Activity   Drug Use No     Social History     Tobacco Use   Smoking Status Current Every Day Smoker    Packs/day: 1 00    Types: Cigarettes   Smokeless Tobacco Never Used   Tobacco Comment    Pt stated he is ready to quit sometimes       ALLERGIES:  Allergies   Allergen Reactions    Other GI Intolerance     amlodipijne- benzepril       MEDICATIONS:  All current active medications have been reviewed  Antibiotics:  Cefepime and Flagyl 1    PHYSICAL EXAM:  Temp:  [97 8 °F (36 6 °C)-98 9 °F (37 2 °C)] 98 9 °F (37 2 °C)  HR:  [] 115  Resp:  [16-20] 16  BP: (162-219)/() 172/80  SpO2:  [93 %-98 %] 98 %  Temp (24hrs), Av 6 °F (37 °C), Min:97 8 °F (36 6 °C), Max:98 9 °F (37 2 °C)  Current: Temperature: 98 9 °F (37 2 °C)    Intake/Output Summary (Last 24 hours) at 2019 1733  Last data filed at 2019 1601  Gross per 24 hour   Intake 5226 44 ml   Output 2050 ml   Net 3176 44 ml       General Appearance:  Appearing well, nontoxic, a bit tremulous   Head:  Normocephalic, without obvious abnormality, atraumatic   Eyes:  Conjunctiva pink and sclera anicteric, both eyes   Nose: Nares normal, mucosa normal, no drainage   Throat: Oropharynx moist without lesions   Neck: Supple, symmetrical, no adenopathy, no tenderness/mass/nodules   Back:   Symmetric, no curvature, ROM normal, no CVA tenderness   Lungs:   Clear to auscultation bilaterally, respirations unlabored   Chest Wall:  No tenderness or deformity   Heart:  RRR; no murmur, rub or gallop   Abdomen:   Soft, midepigastric tenderness, non-distended, positive bowel sounds    Extremities: No cyanosis, clubbing or edema   Skin: No rashes or lesions  No draining wounds noted  Lymph nodes: Cervical, supraclavicular nodes normal   Neurologic: Alert and oriented times 3, extremity strength 5/5 and symmetric       LABS, IMAGING, & OTHER STUDIES:  Lab Results:  I have personally reviewed pertinent labs    Results from last 7 days   Lab Units 19  0435 19  0826   WBC Thousand/uL 9 89 12 21*   HEMOGLOBIN g/dL 13 2 14 5   PLATELETS Thousands/uL 208 245     Results from last 7 days   Lab Units 19  0435 19  0826   SODIUM mmol/L 130* 131*   POTASSIUM mmol/L 3 4* 2 8*   CHLORIDE mmol/L 95* 93*   CO2 mmol/L 22 21   BUN mg/dL 4* 7   CREATININE mg/dL 0 66 0 94   EGFR ml/min/1 73sq m 115 96   CALCIUM mg/dL 7 6* 8 0*   AST U/L 24 22   ALT U/L 24 25   ALK PHOS U/L 80 100     Results from last 7 days   Lab Units 06/30/19  0845 06/30/19  0826   BLOOD CULTURE  No Growth at 24 hrs  No Growth at 24 hrs  Results from last 7 days   Lab Units 07/01/19  0435   PROCALCITONIN ng/ml 13 99*       Imaging Studies:   I have personally reviewed pertinent imaging study reports and images in PACS      CT abdomen pelvis-enlarging lesion on the head of the pancreas suggesting a pseudocyst forming    Right upper quadrant ultrasound-normal gallbladder, hepatic steatosis, pancreatic head cyst

## 2019-07-01 NOTE — ASSESSMENT & PLAN NOTE
· Patient drank beer twice last week/a few days ago  · Patient was counseled regarding importance of alcohol cessation to prevent recurrence of pancreatitis  · Possibility of meeting our HOST staff and possible alcohol rehab were offered on admission  · Ct CIWA protocol  · Continue patient's thiamine and folate

## 2019-07-01 NOTE — PLAN OF CARE
Problem: GASTROINTESTINAL - ADULT  Goal: Minimal or absence of nausea and/or vomiting  Description  INTERVENTIONS:  - Administer IV fluids as ordered to ensure adequate hydration  - Maintain NPO status until nausea and vomiting are resolved  - Nasogastric tube as ordered  - Administer ordered antiemetic medications as needed  - Provide nonpharmacologic comfort measures as appropriate  - Advance diet as tolerated, if ordered  - Nutrition services referral to assist patient with adequate nutrition and appropriate food choices  Outcome: Progressing  Goal: Maintains or returns to baseline bowel function  Description  INTERVENTIONS:  - Assess bowel function  - Encourage oral fluids to ensure adequate hydration  - Administer IV fluids as ordered to ensure adequate hydration  - Administer ordered medications as needed  - Encourage mobilization and activity  - Nutrition services referral to assist patient with appropriate food choices  Outcome: Progressing  Goal: Maintains adequate nutritional intake  Description  INTERVENTIONS:  - Monitor percentage of each meal consumed  - Identify factors contributing to decreased intake, treat as appropriate  - Assist with meals as needed  - Monitor I&O, WT and lab values  - Obtain nutrition services referral as needed  Outcome: Progressing     Problem: METABOLIC, FLUID AND ELECTROLYTES - ADULT  Goal: Fluid balance maintained  Description  INTERVENTIONS:  - Monitor labs and assess for signs and symptoms of volume excess or deficit  - Monitor I/O and WT  - Instruct patient on fluid and nutrition as appropriate  Outcome: Progressing     Problem: PAIN - ADULT  Goal: Verbalizes/displays adequate comfort level or baseline comfort level  Description  Interventions:  - Encourage patient to monitor pain and request assistance  - Assess pain using appropriate pain scale  - Administer analgesics based on type and severity of pain and evaluate response  - Implement non-pharmacological measures as appropriate and evaluate response  - Consider cultural and social influences on pain and pain management  - Notify physician/advanced practitioner if interventions unsuccessful or patient reports new pain  Outcome: Progressing     Problem: SAFETY ADULT  Goal: Patient will remain free of falls  Description  INTERVENTIONS:  - Assess patient frequently for physical needs  -  Identify cognitive and physical deficits and behaviors that affect risk of falls    -  [place] fall precautions as indicated by assessment   - Educate patient/family on patient safety including physical limitations  - Instruct patient to call for assistance with activity based on assessment  - Modify environment to reduce risk of injury  - Consider OT/PT consult to assist with strengthening/mobility  Outcome: Progressing     Problem: Prexisting or High Potential for Compromised Skin Integrity  Goal: Skin integrity is maintained or improved  Description  INTERVENTIONS:  - Identify patients at risk for skin breakdown  - Assess and monitor skin integrity  - Assess and monitor nutrition and hydration status  - Monitor labs (i e  albumin)  - Assess for incontinence   - Turn and reposition patient  - Assist with mobility/ambulation  - Relieve pressure over bony prominences  - Avoid friction and shearing  - Provide appropriate hygiene as needed including keeping skin clean and dry  - Evaluate need for skin moisturizer/barrier cream  - Collaborate with interdisciplinary team (i e  Nutrition, Rehabilitation, etc )   - Patient/family teaching  Outcome: Progressing

## 2019-07-01 NOTE — PROGRESS NOTES
Was informed by the patient's nurse that the patient's blood pressure is still high, and that the patient is still having abdominal pain that requires Dilaudid q 1 hour  Upon examining the patient he is coherent, in no acute distress  Blood pressure at this time is noted to be 194/102 after p r n  anithypertensive medications and IV Dilaudid  The case was discussed with the critical care advanced practitioner, including possibility of IV antihypertensive medication such as Cardene  It is felt this time that the patient has high blood pressures are secondary to his uncontrolled pain, and the PCA be ordered at this time  The patient will be moved to step-down level 2 for closer monitoring  If his pain is controlled and his blood pressure still continued to be high, will need to upgrade level of care for IV nicardipine drip  Will discuss with attending physician      Carlos Dempsey PA-C

## 2019-07-02 PROBLEM — F10.239 ALCOHOL WITHDRAWAL (HCC): Status: ACTIVE | Noted: 2019-06-11

## 2019-07-02 LAB
ALBUMIN SERPL BCP-MCNC: 2.9 G/DL (ref 3.5–5)
ALP SERPL-CCNC: 82 U/L (ref 46–116)
ALT SERPL W P-5'-P-CCNC: 21 U/L (ref 12–78)
ANION GAP SERPL CALCULATED.3IONS-SCNC: 15 MMOL/L (ref 4–13)
AST SERPL W P-5'-P-CCNC: 20 U/L (ref 5–45)
BASOPHILS # BLD MANUAL: 0 THOUSAND/UL (ref 0–0.1)
BASOPHILS NFR MAR MANUAL: 0 % (ref 0–1)
BILIRUB SERPL-MCNC: 1.3 MG/DL (ref 0.2–1)
BUN SERPL-MCNC: 7 MG/DL (ref 5–25)
CALCIUM SERPL-MCNC: 7.6 MG/DL (ref 8.3–10.1)
CHLORIDE SERPL-SCNC: 93 MMOL/L (ref 100–108)
CO2 SERPL-SCNC: 22 MMOL/L (ref 21–32)
CREAT SERPL-MCNC: 0.83 MG/DL (ref 0.6–1.3)
EOSINOPHIL # BLD MANUAL: 0 THOUSAND/UL (ref 0–0.4)
EOSINOPHIL NFR BLD MANUAL: 0 % (ref 0–6)
ERYTHROCYTE [DISTWIDTH] IN BLOOD BY AUTOMATED COUNT: 13.2 % (ref 11.6–15.1)
GFR SERPL CREATININE-BSD FRML MDRD: 105 ML/MIN/1.73SQ M
GLUCOSE SERPL-MCNC: 112 MG/DL (ref 65–140)
HCT VFR BLD AUTO: 40 % (ref 36.5–49.3)
HGB BLD-MCNC: 13.5 G/DL (ref 12–17)
LIPASE SERPL-CCNC: 1757 U/L (ref 73–393)
LYMPHOCYTES # BLD AUTO: 0.36 THOUSAND/UL (ref 0.6–4.47)
LYMPHOCYTES # BLD AUTO: 9 % (ref 14–44)
MAGNESIUM SERPL-MCNC: 1.7 MG/DL (ref 1.6–2.6)
MCH RBC QN AUTO: 32.6 PG (ref 26.8–34.3)
MCHC RBC AUTO-ENTMCNC: 33.8 G/DL (ref 31.4–37.4)
MCV RBC AUTO: 97 FL (ref 82–98)
MONOCYTES # BLD AUTO: 0.16 THOUSAND/UL (ref 0–1.22)
MONOCYTES NFR BLD: 4 % (ref 4–12)
NEUTROPHILS # BLD MANUAL: 3.45 THOUSAND/UL (ref 1.85–7.62)
NEUTS BAND NFR BLD MANUAL: 10 % (ref 0–8)
NEUTS SEG NFR BLD AUTO: 77 % (ref 43–75)
NRBC BLD AUTO-RTO: 0 /100 WBCS
PLATELET # BLD AUTO: 170 THOUSANDS/UL (ref 149–390)
PLATELET BLD QL SMEAR: ADEQUATE
PMV BLD AUTO: 10.1 FL (ref 8.9–12.7)
POTASSIUM SERPL-SCNC: 3.5 MMOL/L (ref 3.5–5.3)
PROCALCITONIN SERPL-MCNC: 7.15 NG/ML
PROT SERPL-MCNC: 7.4 G/DL (ref 6.4–8.2)
RBC # BLD AUTO: 4.14 MILLION/UL (ref 3.88–5.62)
SODIUM SERPL-SCNC: 130 MMOL/L (ref 136–145)
TOTAL CELLS COUNTED SPEC: 100
WBC # BLD AUTO: 3.96 THOUSAND/UL (ref 4.31–10.16)

## 2019-07-02 PROCEDURE — 83735 ASSAY OF MAGNESIUM: CPT | Performed by: INTERNAL MEDICINE

## 2019-07-02 PROCEDURE — C9113 INJ PANTOPRAZOLE SODIUM, VIA: HCPCS | Performed by: INTERNAL MEDICINE

## 2019-07-02 PROCEDURE — 83690 ASSAY OF LIPASE: CPT | Performed by: PHYSICIAN ASSISTANT

## 2019-07-02 PROCEDURE — 84145 PROCALCITONIN (PCT): CPT | Performed by: INTERNAL MEDICINE

## 2019-07-02 PROCEDURE — 80053 COMPREHEN METABOLIC PANEL: CPT | Performed by: INTERNAL MEDICINE

## 2019-07-02 PROCEDURE — 99252 IP/OBS CONSLTJ NEW/EST SF 35: CPT | Performed by: INTERNAL MEDICINE

## 2019-07-02 PROCEDURE — 99232 SBSQ HOSP IP/OBS MODERATE 35: CPT | Performed by: INTERNAL MEDICINE

## 2019-07-02 PROCEDURE — 85027 COMPLETE CBC AUTOMATED: CPT | Performed by: INTERNAL MEDICINE

## 2019-07-02 PROCEDURE — 94762 N-INVAS EAR/PLS OXIMTRY CONT: CPT

## 2019-07-02 PROCEDURE — 85007 BL SMEAR W/DIFF WBC COUNT: CPT | Performed by: INTERNAL MEDICINE

## 2019-07-02 PROCEDURE — 94760 N-INVAS EAR/PLS OXIMETRY 1: CPT

## 2019-07-02 RX ORDER — LORAZEPAM 2 MG/ML
4 INJECTION INTRAMUSCULAR ONCE
Status: COMPLETED | OUTPATIENT
Start: 2019-07-02 | End: 2019-07-02

## 2019-07-02 RX ORDER — LABETALOL 20 MG/4 ML (5 MG/ML) INTRAVENOUS SYRINGE
10 ONCE
Status: COMPLETED | OUTPATIENT
Start: 2019-07-02 | End: 2019-07-02

## 2019-07-02 RX ORDER — LORAZEPAM 2 MG/ML
2 INJECTION INTRAMUSCULAR ONCE
Status: COMPLETED | OUTPATIENT
Start: 2019-07-02 | End: 2019-07-02

## 2019-07-02 RX ADMIN — ENALAPRILAT 1.25 MG: 1.25 INJECTION INTRAVENOUS at 08:30

## 2019-07-02 RX ADMIN — METOPROLOL TARTRATE 5 MG: 5 INJECTION, SOLUTION INTRAVENOUS at 05:41

## 2019-07-02 RX ADMIN — LORAZEPAM 4 MG: 2 INJECTION INTRAMUSCULAR; INTRAVENOUS at 18:05

## 2019-07-02 RX ADMIN — ENALAPRILAT 1.25 MG: 1.25 INJECTION INTRAVENOUS at 04:42

## 2019-07-02 RX ADMIN — THIAMINE HYDROCHLORIDE 100 MG: 100 INJECTION, SOLUTION INTRAMUSCULAR; INTRAVENOUS at 10:37

## 2019-07-02 RX ADMIN — LORAZEPAM 4 MG: 2 INJECTION INTRAMUSCULAR; INTRAVENOUS at 10:11

## 2019-07-02 RX ADMIN — METOPROLOL TARTRATE 5 MG: 5 INJECTION, SOLUTION INTRAVENOUS at 12:32

## 2019-07-02 RX ADMIN — ENALAPRILAT 1.25 MG: 1.25 INJECTION INTRAVENOUS at 22:30

## 2019-07-02 RX ADMIN — LORAZEPAM 1 MG: 2 INJECTION INTRAMUSCULAR; INTRAVENOUS at 06:49

## 2019-07-02 RX ADMIN — LORAZEPAM 4 MG: 2 INJECTION INTRAMUSCULAR; INTRAVENOUS at 07:28

## 2019-07-02 RX ADMIN — SODIUM CHLORIDE, SODIUM GLUCONATE, SODIUM ACETATE, POTASSIUM CHLORIDE AND MAGNESIUM CHLORIDE 250 ML/HR: 526; 502; 368; 37; 30 INJECTION, SOLUTION INTRAVENOUS at 13:47

## 2019-07-02 RX ADMIN — PANTOPRAZOLE SODIUM 40 MG: 40 INJECTION, POWDER, FOR SOLUTION INTRAVENOUS at 08:30

## 2019-07-02 RX ADMIN — LORAZEPAM 2 MG: 2 INJECTION INTRAMUSCULAR; INTRAVENOUS at 06:13

## 2019-07-02 RX ADMIN — LORAZEPAM 2 MG: 2 INJECTION INTRAMUSCULAR; INTRAVENOUS at 09:35

## 2019-07-02 RX ADMIN — LORAZEPAM 4 MG: 2 INJECTION INTRAMUSCULAR; INTRAVENOUS at 10:31

## 2019-07-02 RX ADMIN — FOLIC ACID 1 MG: 5 INJECTION, SOLUTION INTRAMUSCULAR; INTRAVENOUS; SUBCUTANEOUS at 08:41

## 2019-07-02 RX ADMIN — SODIUM CHLORIDE, SODIUM GLUCONATE, SODIUM ACETATE, POTASSIUM CHLORIDE AND MAGNESIUM CHLORIDE 250 ML/HR: 526; 502; 368; 37; 30 INJECTION, SOLUTION INTRAVENOUS at 18:11

## 2019-07-02 RX ADMIN — ONDANSETRON 4 MG: 2 INJECTION INTRAMUSCULAR; INTRAVENOUS at 06:13

## 2019-07-02 RX ADMIN — LORAZEPAM 2 MG: 2 INJECTION, SOLUTION INTRAMUSCULAR; INTRAVENOUS at 19:41

## 2019-07-02 RX ADMIN — NICOTINE 1 PATCH: 14 PATCH TRANSDERMAL at 08:30

## 2019-07-02 RX ADMIN — LABETALOL 20 MG/4 ML (5 MG/ML) INTRAVENOUS SYRINGE 10 MG: at 06:49

## 2019-07-02 RX ADMIN — SODIUM CHLORIDE, SODIUM GLUCONATE, SODIUM ACETATE, POTASSIUM CHLORIDE AND MAGNESIUM CHLORIDE 250 ML/HR: 526; 502; 368; 37; 30 INJECTION, SOLUTION INTRAVENOUS at 04:42

## 2019-07-02 RX ADMIN — METOPROLOL TARTRATE 5 MG: 5 INJECTION, SOLUTION INTRAVENOUS at 17:11

## 2019-07-02 RX ADMIN — ENALAPRILAT 1.25 MG: 1.25 INJECTION INTRAVENOUS at 15:17

## 2019-07-02 RX ADMIN — LORAZEPAM 4 MG: 2 INJECTION INTRAMUSCULAR; INTRAVENOUS at 15:57

## 2019-07-02 RX ADMIN — LORAZEPAM 4 MG: 2 INJECTION INTRAMUSCULAR; INTRAVENOUS at 08:34

## 2019-07-02 NOTE — ASSESSMENT & PLAN NOTE
· On the last admission, patient had a triglyceride level more than 1,000  · Repeat level improved  · Continue patient's atorvastatin and Tricor  Faria Gris

## 2019-07-02 NOTE — PROGRESS NOTES
Prior to administration of 1mg ativan, did reassessment using CIWA and patient score of 2  Notified SLIM that patient does not warrant any medication at this time  Will continue to monitor

## 2019-07-02 NOTE — PLAN OF CARE
Problem: GASTROINTESTINAL - ADULT  Goal: Minimal or absence of nausea and/or vomiting  Description  INTERVENTIONS:  - Administer IV fluids as ordered to ensure adequate hydration  - Maintain NPO status until nausea and vomiting are resolved  - Nasogastric tube as ordered  - Administer ordered antiemetic medications as needed  - Provide nonpharmacologic comfort measures as appropriate  - Advance diet as tolerated, if ordered  - Nutrition services referral to assist patient with adequate nutrition and appropriate food choices  Outcome: Progressing  Goal: Maintains or returns to baseline bowel function  Description  INTERVENTIONS:  - Assess bowel function  - Encourage oral fluids to ensure adequate hydration  - Administer IV fluids as ordered to ensure adequate hydration  - Administer ordered medications as needed  - Encourage mobilization and activity  - Nutrition services referral to assist patient with appropriate food choices  Outcome: Progressing  Goal: Maintains adequate nutritional intake  Description  INTERVENTIONS:  - Monitor percentage of each meal consumed  - Identify factors contributing to decreased intake, treat as appropriate  - Assist with meals as needed  - Monitor I&O, WT and lab values  - Obtain nutrition services referral as needed  Outcome: Progressing     Problem: METABOLIC, FLUID AND ELECTROLYTES - ADULT  Goal: Fluid balance maintained  Description  INTERVENTIONS:  - Monitor labs and assess for signs and symptoms of volume excess or deficit  - Monitor I/O and WT  - Instruct patient on fluid and nutrition as appropriate  Outcome: Progressing     Problem: PAIN - ADULT  Goal: Verbalizes/displays adequate comfort level or baseline comfort level  Description  Interventions:  - Encourage patient to monitor pain and request assistance  - Assess pain using appropriate pain scale  - Administer analgesics based on type and severity of pain and evaluate response  - Implement non-pharmacological measures as appropriate and evaluate response  - Consider cultural and social influences on pain and pain management  - Notify physician/advanced practitioner if interventions unsuccessful or patient reports new pain  Outcome: Progressing     Problem: SAFETY ADULT  Goal: Patient will remain free of falls  Description  INTERVENTIONS:  - Assess patient frequently for physical needs  -  Identify cognitive and physical deficits and behaviors that affect risk of falls    -  [place] fall precautions as indicated by assessment   - Educate patient/family on patient safety including physical limitations  - Instruct patient to call for assistance with activity based on assessment  - Modify environment to reduce risk of injury  - Consider OT/PT consult to assist with strengthening/mobility  Outcome: Progressing     Problem: Prexisting or High Potential for Compromised Skin Integrity  Goal: Skin integrity is maintained or improved  Description  INTERVENTIONS:  - Identify patients at risk for skin breakdown  - Assess and monitor skin integrity  - Assess and monitor nutrition and hydration status  - Monitor labs (i e  albumin)  - Assess for incontinence   - Turn and reposition patient  - Assist with mobility/ambulation  - Relieve pressure over bony prominences  - Avoid friction and shearing  - Provide appropriate hygiene as needed including keeping skin clean and dry  - Evaluate need for skin moisturizer/barrier cream  - Collaborate with interdisciplinary team (i e  Nutrition, Rehabilitation, etc )   - Patient/family teaching  Outcome: Progressing

## 2019-07-02 NOTE — ASSESSMENT & PLAN NOTE
· Patient was just admitted about 2 weeks ago due to acute uncomplicated alcoholic pancreatitis, with hypertriglyceridemia  · Patient came back today with nausea, vomiting as well as abdominal and bilateral pains  Recurrent pancreatitis likely due to alcohol use  · IV fluids/NPO/  · Pain appears improved  Will D/C Dilaudid PCA pump  IV dilaudid prn  · Much improved triglyceride [id number]  Continue patient's atorvastatin and tricor  · Importance of alcohol cessation counseling stressed out to the patient  Please see management and plans under alcohol abuse

## 2019-07-02 NOTE — PROGRESS NOTES
Progress Note - Hunter Argueta 53 y o  male MRN: [id number]  Unit/Bed#: -01 Encounter: [id number]    Assessment and Plan:   Principal Problem:    Recurrent acute pancreatitis  Active Problems:    Hypertensive urgency    Hyponatremia    GERD (gastroesophageal reflux disease)    Mixed hyperlipidemia    Tobacco dependence    ETOH abuse    Hypokalemia    SIRS (systemic inflammatory response syndrome) (HCC)    Pancreatic lesion    Constipation    Abnormal urinalysis    Intractable pain    Electrolyte abnormality     Acute recurrent pancreatitis with pseudocyst  -initial episode about 6 weeks prior with heavy alcohol use and high triglycerides (400 yesterday compared to >1000 2 weeks ago)  He has persistent active alcohol use and is currently withdrawing   -He has improvement in abdominal pain and abdominal exam   -okay for clear liquid diet  -continue IV fluids and pain management  -If clinical worsening would recommend repeat CT to rule out evidence of necrosis or worsening inflammation  -He will need an outpatient MRI in about 6-8 weeks to assess for decreasing size of pseudocyst      ----------------------------------------------------------------------------------------------------------------    Subjective:     Patient in actively in withdrawal  Reports improvement in abdominal pain     Objective:     Vitals: Blood pressure 110/65, pulse 105, temperature 98 6 °F (37 °C), temperature source Axillary, resp  rate 18, height 6' 4" (1 93 m), weight 99 5 kg (219 lb 5 7 oz), SpO2 94 %  ,Body mass index is 26 7 kg/m²        Intake/Output Summary (Last 24 hours) at 7/2/2019 0951  Last data filed at 7/2/2019 1480  Gross per 24 hour   Intake 3116 74 ml   Output 2050 ml   Net 1066 74 ml     Physical Exam:   General Appearance: Alert, confused, diaphoretic  Lungs: No labored breathing/accessory muscle use  Heart: tachycardic  Abdomen: Soft, non-tender, non-distended; bowel sounds normal  Extremities: No edema    Invasive Devices     Peripheral Intravenous Line            Peripheral IV 06/30/19 Right Antecubital 2 days    Peripheral IV 07/01/19 Left Antecubital less than 1 day                Lab Results:  Results from last 7 days   Lab Units 07/02/19  0639  06/30/19  0826   WBC Thousand/uL 3 96*   < > 12 21*   HEMOGLOBIN g/dL 13 5   < > 14 5   HEMATOCRIT % 40 0   < > 41 4   PLATELETS Thousands/uL 170   < > 245   NEUTROS PCT %  --   --  94*   LYMPHS PCT %  --   --  2*   LYMPHO PCT % 9*   < >  --    MONOS PCT %  --   --  3*   MONO PCT % 4   < >  --    EOS PCT % 0   < > 0    < > = values in this interval not displayed  Results from last 7 days   Lab Units 07/02/19  0639   POTASSIUM mmol/L 3 5   CHLORIDE mmol/L 93*   CO2 mmol/L 22   BUN mg/dL 7   CREATININE mg/dL 0 83   CALCIUM mg/dL 7 6*   ALK PHOS U/L 82   ALT U/L 21   AST U/L 20     Results from last 7 days   Lab Units 06/30/19  0826   INR  1 10     Results from last 7 days   Lab Units 06/30/19  0826   LIPASE u/L 1,114*       Imaging Studies: I have personally reviewed pertinent imaging studies  Xr Chest 2 Views    Result Date: 6/30/2019  Impression: No acute cardiopulmonary disease  Workstation performed: [id number]     Us Right Upper Quadrant    Result Date: 6/30/2019  Impression: 1  Normal gallbladder  2   Enlarged echogenic liver likely due to hepatic steatosis  3   Enlarging pancreatic head cyst, possibly a pseudocyst given acute inflammatory changes  Continued surveillance recommended as per previous studies  Workstation performed: [id number]     Ct Abdomen Pelvis With Contrast    Result Date: 6/30/2019  Impression: Enlarging lesion of the head of the pancreas, set for follow-up in September  Otherwise there is subtle inflammation adjacent to the pancreas  It's difficult to say if this is due to ongoing pancreatitis, or merely a sequela of the previous pancreatitis   Workstation performed: [id number]

## 2019-07-02 NOTE — PROGRESS NOTES
Progress Note - Infectious Disease   Rupert Haney 53 y o  male MRN: [id number]  Unit/Bed#: -01 Encounter: [id number]      Impression/Plan:  1  Acute pancreatitis-recurrent  Suspect secondary to ongoing alcohol use  Perhaps hypertriglyceridemia is also playing a role  The patient is now forming a pseudocyst   No clear clinical evidence of an active infectious process at this time  There is no clinical indication for antibiotic treatment of acute pancreatitis  The procalcitonin level is elevated, however pancreatitis is a known cause of false-positive test for procalcitonin  The patient remains afebrile and the white cell count has decreased off all antibiotic  He does have a bandemia which may reflect acute inflammatory response from the pancreatitis with alcohol withdrawal  -monitor off all antibiotics  -monitor CBC with diff  -supportive care  -repeat CT the abdomen and pelvis as needed  -close GI follow-up  -pain management     2  Pancreatic pseudocyst-as a complication of acute pancreatitis  While pseudocysts can become infected, there is currently no clinical evidence of an infected pancreatic pseudocyst at this time  Patient remains afebrile and without leukocytosis  The abdomen is less tender   -no antibiotics for now  -serial exams  -if the patient would begin spiking fever, would recommend repeat CT the abdomen and pelvis and aspiration of the pancreatic pseudocyst to make sure it is not become secondarily infected  -GI follow-up     3  Alcohol abuse-the patient continues to drink alcohol despite the recent pancreatitis  No appears to be in alcohol withdrawal as low  -complete alcohol abstinence  -treatment of withdrawal as below  -thiamine and folate    4  Acute encephalopathy-appears to be secondary to alcohol withdrawal  -continue CIWA protocol  -chest received a dose of Ativan  -on her cognition      5   Hyponatremia and hypokalemia-suspect related to electrolyte depletion and perhaps SIADH playing a role  Remains hypernatremic although a potassium has recovered  -electrolyte replacement as needed  -monitor volume status  -recheck BMP     Discussed the above plan with the primary service in detail    We will see the patient intermittently as needed  Please call if questions  Antibiotics:  None    Subjective:  Patient has no fever, chills, sweats; no nausea, vomiting, diarrhea; no cough, shortness of breath; less abdominal pain  He has now become quite confused and tremulous and is felt to be undergoing alcohol withdrawal     Objective:  Vitals:  Temp:  [98 2 °F (36 8 °C)-99 9 °F (37 7 °C)] 98 8 °F (37 1 °C)  HR:  [] 94  Resp:  [15-20] 18  BP: (110-190)/() 157/89  SpO2:  [92 %-98 %] 98 %  Temp (24hrs), Av 7 °F (37 1 °C), Min:98 2 °F (36 8 °C), Max:99 9 °F (37 7 °C)  Current: Temperature: 98 8 °F (37 1 °C)    Physical Exam:   General Appearance:  Summary, confused, a bit tremulous   Throat: Oropharynx dry without lesions  Lungs:   Clear to auscultation bilaterally; no wheezes, rhonchi or rales; respirations unlabored   Heart:  RRR; no murmur, rub or gallop   Abdomen:   Soft, mild epigastric tenderness, non-distended, positive bowel sounds  Extremities: No clubbing, cyanosis or edema   Skin: No new rashes or lesions  No draining wounds noted         Labs, Imaging, & Other studies:   All pertinent labs and imaging studies were personally reviewed  Results from last 7 days   Lab Units 19  0619  0826   WBC Thousand/uL 3 96* 9 89 12 21*   HEMOGLOBIN g/dL 13 5 13 2 14 5   PLATELETS Thousands/uL 170 208 245     Results from last 7 days   Lab Units 19  0639 19  0435 19  0826   SODIUM mmol/L 130* 130* 131*   POTASSIUM mmol/L 3 5 3 4* 2 8*   CHLORIDE mmol/L 93* 95* 93*   CO2 mmol/L 22 22 21   BUN mg/dL 7 4* 7   CREATININE mg/dL 0 83 0 66 0 94   EGFR ml/min/1 73sq m 105 115 96   CALCIUM mg/dL 7 6* 7 6* 8 0*   AST U/L 20 24 22   ALT U/L 21 24 25   ALK PHOS U/L 82 80 100     Results from last 7 days   Lab Units 06/30/19  0845 06/30/19  0826   BLOOD CULTURE  No Growth at 48 hrs  No Growth at 48 hrs       Results from last 7 days   Lab Units 07/02/19  0639 07/01/19  0435   PROCALCITONIN ng/ml 7 15* 13 99*

## 2019-07-02 NOTE — PROGRESS NOTES
Patient CIWA an 8 at 2100  Call to SLIM in order to verify if should administer 2mg ativan per protocol  1mg ativan given at 1945 PRN as ordered  Patient also on PCA dilaudid pump  SLIM verified ok to administer 1mg of ativan at this time and continue to monitor vitals and LOC

## 2019-07-02 NOTE — PROGRESS NOTES
Patient is sleeping  SLIM checked on patient  Told SLIM that patient would be woken up to assess vitals and CIWA  OTTO said to skip the 1115 set and pick back up with it at 1215  Will continue to monitor

## 2019-07-02 NOTE — ASSESSMENT & PLAN NOTE
Improved  Likaly related to alcohol withdrawl  Ct CIWA protocol  Added clonidine patch  BP better    May resume  PO BP meds when ok to take oral

## 2019-07-02 NOTE — ASSESSMENT & PLAN NOTE
· Likely representing a pseudocyst     · Felt falselyelevated procalcitonin level due to pancreatitis  Abx discontinued  ID input appreciated  · Repeat imaging if change in clinical status   GI following

## 2019-07-02 NOTE — ASSESSMENT & PLAN NOTE
· Patient states that he drank beer twice last week? · Required frequent ativan IV  Spoke with CC  Will closely monitor on floor for now  Continuous pulse oximetry monitoring  · Consult HOST  · Ct JAVIER protocol  · Continue patient's thiamine and folate

## 2019-07-02 NOTE — PROGRESS NOTES
Progress Note - Dominic Mercer 1970, 53 y o  male MRN: [id number]    Unit/Bed#: -01 Encounter: [id number]    Primary Care Provider: Carole Arteaga MD   Date and time admitted to hospital: 6/30/2019  8:07 AM        * Recurrent acute pancreatitis  Assessment & Plan  · Patient was just admitted about 2 weeks ago due to acute uncomplicated alcoholic pancreatitis, with hypertriglyceridemia  · Patient came back today with nausea, vomiting as well as abdominal and bilateral pains  Recurrent pancreatitis likely due to alcohol use  · IV fluids/NPO/  · Pain appears improved  Will D/C Dilaudid PCA pump  IV dilaudid prn  · Much improved triglyceride [id number]  Continue patient's atorvastatin and tricor  · Importance of alcohol cessation counseling stressed out to the patient  Please see management and plans under alcohol abuse  Pancreatic lesion  Assessment & Plan  · Likely representing a pseudocyst     · Felt falselyelevated procalcitonin level due to pancreatitis  Abx discontinued  ID input appreciated  · Repeat imaging if change in clinical status  GI following    Hypertensive urgency  Assessment & Plan  Improved  Likaly related to alcohol withdrawl  Ct CIWA protocol  Added clonidine patch  BP better  May resume  PO BP meds when ok to take oral     Alcohol withdrawal ([facility] [address] )  Assessment & Plan  · Patient states that he drank beer twice last week? · Required frequent ativan IV  Spoke with CC  Will closely monitor on floor for now  Continuous pulse oximetry monitoring  · Consult HOST  · Ct CIWA protocol  · Continue patient's thiamine and folate  Mixed hyperlipidemia  Assessment & Plan  · On the last admission, patient had a triglyceride level more than 1,000  · Repeat level improved  · Continue patient's atorvastatin and Tricor                VTE Pharmacologic Prophylaxis:   Pharmacologic:   Mechanical VTE Prophylaxis in Place:     Patient Centered Rounds: I have performed bedside rounds with nursing staff today  Discussions with Specialists or Other Care Team Provider: CC team    Education and Discussions with Family / Patient: I attempted to cell pt's wife  Message was left earlier    Time Spent for Care: 20 minutes  More than 50% of total time spent on counseling and coordination of care as described above  Current Length of Stay: 2 day(s)    Current Patient Status: Inpatient   Certification Statement: The patient will continue to require additional inpatient hospital stay due to above    Discharge Plan / Estimated Discharge Date: Not ready for DC    Code Status: Level 1 - Full Code      Subjective:   Pt was delirious this morning  He eventually calmed down after multiple IV ativan and is now in sleep  Denies abd pain    Objective:     Vitals:   Temp (24hrs), Av 8 °F (37 1 °C), Min:98 2 °F (36 8 °C), Max:99 9 °F (37 7 °C)    Temp:  [98 2 °F (36 8 °C)-99 9 °F (37 7 °C)] 98 4 °F (36 9 °C)  HR:  [] 102  Resp:  [15-20] 18  BP: (110-190)/() 124/87  SpO2:  [92 %-98 %] 95 %  Body mass index is 26 7 kg/m²  Input and Output Summary (last 24 hours): Intake/Output Summary (Last 24 hours) at 2019 1222  Last data filed at 2019 0635  Gross per 24 hour   Intake 3116 74 ml   Output 1650 ml   Net 1466 74 ml       Physical Exam:     Physical Exam   Constitutional: No distress  Cardiovascular: Tachycardia present  Pulmonary/Chest: Effort normal and breath sounds normal  No respiratory distress  Abdominal: Soft  Bowel sounds are normal  He exhibits no distension  There is no tenderness  There is no guarding  Musculoskeletal: He exhibits no edema  Neurological:   Agitated asking to leave   Skin: Skin is warm and dry  He is not diaphoretic             Additional Data:     Labs:    Results from last 7 days   Lab Units 19  0639  19  0826   WBC Thousand/uL 3 96*   < > 12 21*   HEMOGLOBIN g/dL 13 5   < > 14 5   HEMATOCRIT % 40 0   < > 41 4   PLATELETS Thousands/uL 170   < > 245   NEUTROS PCT %  --   --  94*   LYMPHS PCT %  --   --  2*   LYMPHO PCT % 9*   < >  --    MONOS PCT %  --   --  3*   MONO PCT % 4   < >  --    EOS PCT % 0   < > 0    < > = values in this interval not displayed  Results from last 7 days   Lab Units 07/02/19  0639   POTASSIUM mmol/L 3 5   CHLORIDE mmol/L 93*   CO2 mmol/L 22   BUN mg/dL 7   CREATININE mg/dL 0 83   CALCIUM mg/dL 7 6*   ALK PHOS U/L 82   ALT U/L 21   AST U/L 20     Results from last 7 days   Lab Units 06/30/19  0826   INR  1 10         Recent Cultures (last 7 days):     Results from last 7 days   Lab Units 06/30/19  0845 06/30/19  0826   BLOOD CULTURE  No Growth at 48 hrs  No Growth at 48 hrs         Last 24 Hours Medication List:     Current Facility-Administered Medications:  acetaminophen 650 mg Oral Q6H PRN Carlos Meredith MD    aluminum-magnesium hydroxide-simethicone 30 mL Oral Q6H PRN Carlos Meredith MD    atorvastatin 10 mg Oral Daily With Dinner Carlos Meredith MD    cloNIDine 0 1 mg Transdermal Weekly Vinny Antunez MD    enalaprilat 1 25 mg Intravenous Q6H Carlos Meredith MD    fenofibrate 145 mg Oral Daily Carlos Meredith MD    folic acid IVPB 1 mg Intravenous Daily Carlos Meredith MD Last Rate: 1 mg (07/02/19 0841)   hydrALAZINE 15 mg Intravenous Q4H PRN Valentin Pringle PA-C    HYDROmorphone 1 mg Intravenous Q1H PRN Valentin Pringle PA-C    LORazepam 1 mg Intravenous Q4H PRN Vinny Antunez MD    metoprolol 5 mg Intravenous Q6H [address] Carlos Meredith MD    multi-electrolyte 250 mL/hr Intravenous Continuous Valentin Pringle PA-C Last Rate: Stopped (07/02/19 0918)   multivitamin stress formula 1 tablet Oral Daily Carlos Meredith MD    naloxone 0 04 mg Intravenous Q3 min PRN Valentin Pringle PA-C    nicotine 1 patch Transdermal Daily Carlos Meredith MD    ondansetron 4 mg Intravenous Q6H PRN Carlos Meredith MD    pantoprazole 40 mg Intravenous Q24H [address] Vy Dawson MD polyethylene glycol 17 g Oral Daily PRN Carlos Meredith MD    senna 2 tablet Oral BID Carlos Meredith MD    thiamine 100 mg Intravenous Daily Carlos Meredith MD Last Rate: 100 mg (07/02/19 1037)        Today, Patient Was Seen By: Ervin Mo MD    ** Please Note: This note has been constructed using a voice recognition system   **

## 2019-07-03 VITALS
HEIGHT: 76 IN | WEIGHT: 219.36 LBS | SYSTOLIC BLOOD PRESSURE: 143 MMHG | OXYGEN SATURATION: 96 % | DIASTOLIC BLOOD PRESSURE: 94 MMHG | BODY MASS INDEX: 26.71 KG/M2 | RESPIRATION RATE: 18 BRPM | HEART RATE: 95 BPM | TEMPERATURE: 98.1 F

## 2019-07-03 PROBLEM — K85.90 RECURRENT ACUTE PANCREATITIS: Status: RESOLVED | Noted: 2017-12-14 | Resolved: 2019-07-03

## 2019-07-03 LAB
ANION GAP SERPL CALCULATED.3IONS-SCNC: 11 MMOL/L (ref 4–13)
BASOPHILS # BLD AUTO: 0.02 THOUSANDS/ΜL (ref 0–0.1)
BASOPHILS NFR BLD AUTO: 0 % (ref 0–1)
BUN SERPL-MCNC: 9 MG/DL (ref 5–25)
CALCIUM SERPL-MCNC: 7.6 MG/DL (ref 8.3–10.1)
CHLORIDE SERPL-SCNC: 101 MMOL/L (ref 100–108)
CO2 SERPL-SCNC: 24 MMOL/L (ref 21–32)
CREAT SERPL-MCNC: 0.64 MG/DL (ref 0.6–1.3)
EOSINOPHIL # BLD AUTO: 0.02 THOUSAND/ΜL (ref 0–0.61)
EOSINOPHIL NFR BLD AUTO: 0 % (ref 0–6)
ERYTHROCYTE [DISTWIDTH] IN BLOOD BY AUTOMATED COUNT: 13.3 % (ref 11.6–15.1)
GFR SERPL CREATININE-BSD FRML MDRD: 117 ML/MIN/1.73SQ M
GLUCOSE SERPL-MCNC: 91 MG/DL (ref 65–140)
HCT VFR BLD AUTO: 34.6 % (ref 36.5–49.3)
HGB BLD-MCNC: 11.6 G/DL (ref 12–17)
IMM GRANULOCYTES # BLD AUTO: 0.05 THOUSAND/UL (ref 0–0.2)
IMM GRANULOCYTES NFR BLD AUTO: 1 % (ref 0–2)
LIPASE SERPL-CCNC: 824 U/L (ref 73–393)
LYMPHOCYTES # BLD AUTO: 1.16 THOUSANDS/ΜL (ref 0.6–4.47)
LYMPHOCYTES NFR BLD AUTO: 12 % (ref 14–44)
MAGNESIUM SERPL-MCNC: 2 MG/DL (ref 1.6–2.6)
MCH RBC QN AUTO: 32.6 PG (ref 26.8–34.3)
MCHC RBC AUTO-ENTMCNC: 33.5 G/DL (ref 31.4–37.4)
MCV RBC AUTO: 97 FL (ref 82–98)
MONOCYTES # BLD AUTO: 1.07 THOUSAND/ΜL (ref 0.17–1.22)
MONOCYTES NFR BLD AUTO: 11 % (ref 4–12)
NEUTROPHILS # BLD AUTO: 7.06 THOUSANDS/ΜL (ref 1.85–7.62)
NEUTS SEG NFR BLD AUTO: 76 % (ref 43–75)
NRBC BLD AUTO-RTO: 0 /100 WBCS
PLATELET # BLD AUTO: 206 THOUSANDS/UL (ref 149–390)
PMV BLD AUTO: 10.5 FL (ref 8.9–12.7)
POTASSIUM SERPL-SCNC: 3.5 MMOL/L (ref 3.5–5.3)
RBC # BLD AUTO: 3.56 MILLION/UL (ref 3.88–5.62)
SODIUM SERPL-SCNC: 136 MMOL/L (ref 136–145)
WBC # BLD AUTO: 9.38 THOUSAND/UL (ref 4.31–10.16)

## 2019-07-03 PROCEDURE — 99239 HOSP IP/OBS DSCHRG MGMT >30: CPT | Performed by: INTERNAL MEDICINE

## 2019-07-03 PROCEDURE — 99232 SBSQ HOSP IP/OBS MODERATE 35: CPT | Performed by: INTERNAL MEDICINE

## 2019-07-03 PROCEDURE — 83735 ASSAY OF MAGNESIUM: CPT | Performed by: INTERNAL MEDICINE

## 2019-07-03 PROCEDURE — C9113 INJ PANTOPRAZOLE SODIUM, VIA: HCPCS | Performed by: INTERNAL MEDICINE

## 2019-07-03 PROCEDURE — 83690 ASSAY OF LIPASE: CPT | Performed by: INTERNAL MEDICINE

## 2019-07-03 PROCEDURE — 85025 COMPLETE CBC W/AUTO DIFF WBC: CPT | Performed by: INTERNAL MEDICINE

## 2019-07-03 PROCEDURE — 80048 BASIC METABOLIC PNL TOTAL CA: CPT | Performed by: INTERNAL MEDICINE

## 2019-07-03 RX ORDER — LORAZEPAM 2 MG/ML
2 INJECTION INTRAMUSCULAR ONCE
Status: COMPLETED | OUTPATIENT
Start: 2019-07-03 | End: 2019-07-03

## 2019-07-03 RX ADMIN — METOPROLOL TARTRATE 5 MG: 5 INJECTION, SOLUTION INTRAVENOUS at 06:55

## 2019-07-03 RX ADMIN — METOPROLOL TARTRATE 5 MG: 5 INJECTION, SOLUTION INTRAVENOUS at 01:00

## 2019-07-03 RX ADMIN — NICOTINE 1 PATCH: 14 PATCH TRANSDERMAL at 08:37

## 2019-07-03 RX ADMIN — PANTOPRAZOLE SODIUM 40 MG: 40 INJECTION, POWDER, FOR SOLUTION INTRAVENOUS at 08:26

## 2019-07-03 RX ADMIN — SODIUM CHLORIDE, SODIUM GLUCONATE, SODIUM ACETATE, POTASSIUM CHLORIDE AND MAGNESIUM CHLORIDE 250 ML/HR: 526; 502; 368; 37; 30 INJECTION, SOLUTION INTRAVENOUS at 01:06

## 2019-07-03 RX ADMIN — LORAZEPAM 2 MG: 2 INJECTION, SOLUTION INTRAMUSCULAR; INTRAVENOUS at 08:45

## 2019-07-03 RX ADMIN — THIAMINE HYDROCHLORIDE 100 MG: 100 INJECTION, SOLUTION INTRAMUSCULAR; INTRAVENOUS at 08:37

## 2019-07-03 RX ADMIN — FOLIC ACID 1 MG: 5 INJECTION, SOLUTION INTRAMUSCULAR; INTRAVENOUS; SUBCUTANEOUS at 09:50

## 2019-07-03 RX ADMIN — LORAZEPAM 1 MG: 2 INJECTION INTRAMUSCULAR; INTRAVENOUS at 06:55

## 2019-07-03 RX ADMIN — METOPROLOL TARTRATE 5 MG: 5 INJECTION, SOLUTION INTRAVENOUS at 12:49

## 2019-07-03 RX ADMIN — SODIUM CHLORIDE, SODIUM GLUCONATE, SODIUM ACETATE, POTASSIUM CHLORIDE AND MAGNESIUM CHLORIDE 250 ML/HR: 526; 502; 368; 37; 30 INJECTION, SOLUTION INTRAVENOUS at 12:52

## 2019-07-03 RX ADMIN — ENALAPRILAT 1.25 MG: 1.25 INJECTION INTRAVENOUS at 08:36

## 2019-07-03 RX ADMIN — ENALAPRILAT 1.25 MG: 1.25 INJECTION INTRAVENOUS at 04:30

## 2019-07-03 NOTE — PROGRESS NOTES
Progress Note - Vin Huff 53 y o  male MRN: [id number]  Unit/Bed#: -01 Encounter: [id number]    Assessment and Plan:   Principal Problem:    Recurrent acute pancreatitis  Active Problems:    Hypertensive urgency    Hyponatremia    GERD (gastroesophageal reflux disease)    Mixed hyperlipidemia    Tobacco dependence    Alcohol withdrawal (HCC)    Hypokalemia    SIRS (systemic inflammatory response syndrome) (HCC)    Pancreatic lesion    Constipation    Abnormal urinalysis    Electrolyte abnormality        Acute recurrent pancreatitis with pseudocyst  -initial episode about 6 weeks prior with heavy alcohol use and high triglycerides (400 yesterday compared to >1000 2 weeks ago)  He has persistent active alcohol use and had withdrawal this admission, has improved  -He has improvement in abdominal pain and abdominal exam   -okay for clear liquid diet and advance as tolerated  -can reduce IV fluids  -He will need an outpatient MRI in about 6-8 weeks to assess for decreasing size of pseudocyst    -Please call if questions  ----------------------------------------------------------------------------------------------------------------    Subjective:    Patient denies abdominal pain, nausea, vomiting  He is passing gas    Objective:     Vitals: Blood pressure 160/96, pulse 91, temperature 97 7 °F (36 5 °C), temperature source Oral, resp  rate 18, height 6' 4" (1 93 m), weight 99 5 kg (219 lb 5 7 oz), SpO2 93 %  ,Body mass index is 26 7 kg/m²        Intake/Output Summary (Last 24 hours) at 7/3/2019 1045  Last data filed at 7/3/2019 0559  Gross per 24 hour   Intake 1258 33 ml   Output 2475 ml   Net -1216 67 ml       Physical Exam:   General Appearance: Alert, appears stated age, agitated  Lungs: No labored breathing/accessory muscle use  Heart: Regular rate and rhythm  Abdomen: Soft, non-tender, non-distended; bowel sounds normal  Extremities: No edema    Invasive Devices     Peripheral Intravenous Line Peripheral IV 06/30/19 Right Antecubital 3 days    Peripheral IV 07/01/19 Left Antecubital 1 day                Lab Results:  Results from last 7 days   Lab Units 07/03/19  0612   WBC Thousand/uL 9 38   HEMOGLOBIN g/dL 11 6*   HEMATOCRIT % 34 6*   PLATELETS Thousands/uL 206   NEUTROS PCT % 76*   LYMPHS PCT % 12*   MONOS PCT % 11   EOS PCT % 0     Results from last 7 days   Lab Units 07/03/19  0623 07/02/19  0639   POTASSIUM mmol/L 3 5 3 5   CHLORIDE mmol/L 101 93*   CO2 mmol/L 24 22   BUN mg/dL 9 7   CREATININE mg/dL 0 64 0 83   CALCIUM mg/dL 7 6* 7 6*   ALK PHOS U/L  --  82   ALT U/L  --  21   AST U/L  --  20     Results from last 7 days   Lab Units 06/30/19  0826   INR  1 10     Results from last 7 days   Lab Units 07/03/19  0623   LIPASE u/L 824*       Imaging Studies: I have personally reviewed pertinent imaging studies  Xr Chest 2 Views    Result Date: 6/30/2019  Impression: No acute cardiopulmonary disease  Workstation performed: [id number]     Us Right Upper Quadrant    Result Date: 6/30/2019  Impression: 1  Normal gallbladder  2   Enlarged echogenic liver likely due to hepatic steatosis  3   Enlarging pancreatic head cyst, possibly a pseudocyst given acute inflammatory changes  Continued surveillance recommended as per previous studies  Workstation performed: [id number]     Ct Abdomen Pelvis With Contrast    Result Date: 6/30/2019  Impression: Enlarging lesion of the head of the pancreas, set for follow-up in September  Otherwise there is subtle inflammation adjacent to the pancreas  It's difficult to say if this is due to ongoing pancreatitis, or merely a sequela of the previous pancreatitis   Workstation performed: [id number]

## 2019-07-03 NOTE — PLAN OF CARE
Problem: GASTROINTESTINAL - ADULT  Goal: Minimal or absence of nausea and/or vomiting  Description  INTERVENTIONS:  - Administer IV fluids as ordered to ensure adequate hydration  - Maintain NPO status until nausea and vomiting are resolved  - Nasogastric tube as ordered  - Administer ordered antiemetic medications as needed  - Provide nonpharmacologic comfort measures as appropriate  - Advance diet as tolerated, if ordered  - Nutrition services referral to assist patient with adequate nutrition and appropriate food choices  Outcome: Progressing  Goal: Maintains or returns to baseline bowel function  Description  INTERVENTIONS:  - Assess bowel function  - Encourage oral fluids to ensure adequate hydration  - Administer IV fluids as ordered to ensure adequate hydration  - Administer ordered medications as needed  - Encourage mobilization and activity  - Nutrition services referral to assist patient with appropriate food choices  Outcome: Progressing  Goal: Maintains adequate nutritional intake  Description  INTERVENTIONS:  - Monitor percentage of each meal consumed  - Identify factors contributing to decreased intake, treat as appropriate  - Assist with meals as needed  - Monitor I&O, WT and lab values  - Obtain nutrition services referral as needed  Outcome: Progressing     Problem: METABOLIC, FLUID AND ELECTROLYTES - ADULT  Goal: Fluid balance maintained  Description  INTERVENTIONS:  - Monitor labs and assess for signs and symptoms of volume excess or deficit  - Monitor I/O and WT  - Instruct patient on fluid and nutrition as appropriate  Outcome: Progressing     Problem: PAIN - ADULT  Goal: Verbalizes/displays adequate comfort level or baseline comfort level  Description  Interventions:  - Encourage patient to monitor pain and request assistance  - Assess pain using appropriate pain scale  - Administer analgesics based on type and severity of pain and evaluate response  - Implement non-pharmacological measures as appropriate and evaluate response  - Consider cultural and social influences on pain and pain management  - Notify physician/advanced practitioner if interventions unsuccessful or patient reports new pain  Outcome: Progressing     Problem: SAFETY ADULT  Goal: Patient will remain free of falls  Description  INTERVENTIONS:  - Assess patient frequently for physical needs  -  Identify cognitive and physical deficits and behaviors that affect risk of falls  -  Strang fall precautions as indicated by assessment   - Educate patient/family on patient safety including physical limitations  - Instruct patient to call for assistance with activity based on assessment  - Modify environment to reduce risk of injury  - Consider OT/PT consult to assist with strengthening/mobility  Outcome: Progressing     Problem: Prexisting or High Potential for Compromised Skin Integrity  Goal: Skin integrity is maintained or improved  Description  INTERVENTIONS:  - Identify patients at risk for skin breakdown  - Assess and monitor skin integrity  - Assess and monitor nutrition and hydration status  - Monitor labs (i e  albumin)  - Assess for incontinence   - Turn and reposition patient  - Assist with mobility/ambulation  - Relieve pressure over bony prominences  - Avoid friction and shearing  - Provide appropriate hygiene as needed including keeping skin clean and dry  - Evaluate need for skin moisturizer/barrier cream  - Collaborate with interdisciplinary team (i e  Nutrition, Rehabilitation, etc )   - Patient/family teaching  Outcome: Progressing     Problem: Nutrition/Hydration-ADULT  Goal: Nutrient/Hydration intake appropriate for improving, restoring or maintaining nutritional needs  Description  Monitor and assess patient's nutrition/hydration status for malnutrition (ex- brittle hair, bruises, dry skin, pale skin and conjunctiva, muscle wasting, smooth red tongue, and disorientation)   Collaborate with interdisciplinary team and initiate plan and interventions as ordered  Monitor patient's weight and dietary intake as ordered or per policy  Utilize nutrition screening tool and intervene per policy  Determine patient's food preferences and provide high-protein, high-caloric foods as appropriate       INTERVENTIONS:  - Monitor oral intake, urinary output, labs, and treatment plans  - Assess nutrition and hydration status and recommend course of action  - Evaluate amount of meals eaten  - Assist patient with eating if necessary   - Allow adequate time for meals  - Recommend/ encourage appropriate diets, oral nutritional supplements, and vitamin/mineral supplements  - Order, calculate, and assess calorie counts as needed  - Recommend, monitor, and adjust tube feedings and TPN/PPN based on assessed needs  - Assess need for intravenous fluids  - Provide specific nutrition/hydration education as appropriate  - Include patient/family/caregiver in decisions related to nutrition  Outcome: Progressing

## 2019-07-03 NOTE — DISCHARGE SUMMARY
Discharge Summary - [facility] Internal Medicine    Patient Information: Shona Felder 53 y o  male MRN: [id number]  Unit/Bed#: -01 Encounter: [id number]    Discharging Physician / Practitioner: Shari Lowery MD  PCP: Alisa Roman MD  Admission Date: 6/30/2019  Discharge Date: 07/03/19    Disposition:     Home    Reason for Admission:  Abdominal pain    Discharge Diagnoses:      Recurrent acute pancreatitis due to alcohol use    Pancreatic pseudocyst    Hypertensive urgency    Alcohol withdrawal ([address] )        Consultations During Hospital Stay:  · GI  · Infectious Disease    Significant Findings / Test Results:     · Right upper quadrant ultrasound showed normal gallbladder  Enlarged echogenic liver likely due to hepatic steatosis  Enlarging pancreatic head cyst possibly a pseudocyst   · CT abdomen and pelvis showed enlarging lesion of the head of the pancreas      Hospital Course:     Shona Felder is a 53 y o  male patient with PMHx of pancreatitis, chronic alcohol use and dyslipidemia who originally presented to the hospital on 6/30/2019 due to abdominal pain  He was recently discharged following for acute pancreatitis which is felt secondary to alcohol and triglyceridemia  He has some beers after the discharge  He presented with severe bilateral flank pain radiating to his abdomen associated with nausea and vomiting  His recurrent pancreatitis is likely due to alcohol use again unfortunately  Triglyceride level was repeated and showed improvement from prior  The importance of alcohol abstinence is reiterated  He eventually improved with supportive care and was able to tolerate diet before discharge  CT abdomen showed finding consistent  with possible pancreatic pseudocyst   His procalcitonin level is elevated which is felt likely due to inflammation process in the setting of pancreatitis  He is otherwise afebrile with normal white blood cell count    Patient was seen by ID and he has been closely monitored without antibiotics  He did go into alcohol withdrawal during the hospital stay and required one-to-one behavioral monitor for agitation  Currently he is alert awake oriented x3  His wife is at his bedside  HOST referral information was offered        Discharge Day Visit / Exam:     Subjective:  Patient appears calm  He tolerated light meal   Very anxious to go home  Wife at bedside  Vitals: Blood Pressure: 143/94 (07/03/19 1512)  Pulse: 95 (07/03/19 1512)  Temperature: 98 1 °F (36 7 °C) (07/03/19 1512)  Temp Source: Oral (07/03/19 1512)  Respirations: 18 (07/03/19 1512)  Height: 6' 4" (193 cm) (06/30/19 1117)  Weight - Scale: 99 5 kg (219 lb 5 7 oz) (06/30/19 1117)  SpO2: 96 % (07/03/19 1512)  Exam:   Physical Exam   Constitutional: No distress  Cardiovascular: Normal rate and regular rhythm  Pulmonary/Chest: Effort normal and breath sounds normal  No respiratory distress  Abdominal: Soft  Bowel sounds are normal  He exhibits no distension  Musculoskeletal: He exhibits no edema  Neurological: He is alert  Skin: Skin is warm  He is not diaphoretic  Psychiatric: He has a normal mood and affect  Discussion with Family: Wife    Discharge instructions/Information to patient and family:   See after visit summary for information provided to patient and family  Provisions for Follow-Up Care:  See after visit summary for information related to follow-up care and any pertinent home health orders  Planned Readmission: No     Discharge Statement:  I spent 30  minutes discharging the patient  This time was spent on the day of discharge  I had direct contact with the patient on the day of discharge  Greater than 50% of the total time was spent examining patient, answering all patient questions, arranging and discussing plan of care with patient as well as directly providing post-discharge instructions  Additional time then spent on discharge activities      Discharge Medications:  See after visit summary for reconciled discharge medications provided to patient and family        ** Please Note: This note has been constructed using a voice recognition system **

## 2019-07-03 NOTE — PROGRESS NOTES
Progress Note - Infectious Disease   Beata Tay 53 y o  male MRN: [id number]  Unit/Bed#: -01 Encounter: [id number]      Impression/Plan:  1  Acute pancreatitis-recurrent   Suspect secondary to ongoing alcohol use   Perhaps hypertriglyceridemia is also playing a role   The patient is now forming a pseudocyst   No clear clinical evidence of an active infectious process at this time  [place] is no clinical indication for antibiotic treatment of acute pancreatitis   The procalcitonin level is elevated, however pancreatitis is a known cause of false-positive test for procalcitonin   The patient remains afebrile and the white cell count has decreased off all antibiotic  He does have a bandemia which may reflect acute inflammatory response from the pancreatitis with alcohol withdrawal  -monitor off all antibiotics  -supportive care  -close GI follow-up  -pain management  -patient is stable from an ID standpoint for discharge      2  Pancreatic pseudocyst-as a complication of acute pancreatitis   While pseudocysts can become infected, there is currently no clinical evidence of an infected pancreatic pseudocyst at this time   Patient remains afebrile and without leukocytosis  He has no further abdominal pain   -serial exams  -additional imaging pending clinical course  -GI follow-up  -Pain management as per primary     3   Alcohol abuse-the patient continues to drink alcohol despite the recent pancreatitis  -complete alcohol abstinence  -treatment of withdrawal  -thiamine and folate      4  Acute encephalopathy-appears to be secondary to alcohol withdrawal  -continue CIWA protocol  -continue to monitor cognition     5  Hyponatremia and hypokalemia-suspect related to electrolyte depletion and perhaps SIADH playing a role  Remains hypernatremic although a potassium has recovered  -electrolyte replacement as needed  -monitor volume status     ID consult service will sign off at this time    Please call if any additional questions or concerns  Antibiotics:  None    24 hour events:  No acute events noted overnight on chart review  Patient is currently afebrile  He is without leukocytosis  No new culture data  No new imaging overnight    Subjective:  Patient has no fever, chills, sweats; no nausea, vomiting, diarrhea; no cough, shortness of breath; no pain  No new symptoms  Patient is insistent to leaving today  Objective:  Vitals:  Temp:  [97 7 °F (36 5 °C)-99 5 °F (37 5 °C)] 97 7 °F (36 5 °C)  HR:  [] 91  Resp:  [16-20] 18  BP: (124-180)/(66-99) 160/96  SpO2:  [93 %-98 %] 93 %  Temp (24hrs), Av 7 °F (37 1 °C), Min:97 7 °F (36 5 °C), Max:99 5 °F (37 5 °C)  Current: Temperature: 97 7 °F (36 5 °C)    Physical Exam:   General Appearance:  Alert, interactive, nontoxic, no acute distress  Throat: Oropharynx moist without lesions  Lungs:   Clear to auscultation bilaterally; no wheezes, rhonchi or rales; respirations unlabored on room air   Heart:  RRR; no murmur, rub or gallop   Abdomen:   Soft, non-tender, non-distended, positive bowel sounds  Obese appearing abdomen  Extremities: No clubbing, cyanosis or edema   Skin: No new rashes or lesions  No new draining wounds noted         Labs, Imaging, & Other studies:   All pertinent labs and imaging studies were personally reviewed  Results from last 7 days   Lab Units 19  0612 19  0639 19  0435   WBC Thousand/uL 9 38 3 96* 9 89   HEMOGLOBIN g/dL 11 6* 13 5 13 2   PLATELETS Thousands/uL 206 170 208     Results from last 7 days   Lab Units 19  0623 19  0639 19  0435 19  0826   POTASSIUM mmol/L 3 5 3 5 3 4* 2 8*   CHLORIDE mmol/L 101 93* 95* 93*   CO2 mmol/L 24 22 22 21   BUN mg/dL 9 7 4* 7   CREATININE mg/dL 0 64 0 83 0 66 0 94   EGFR ml/min/1 73sq m 117 105 115 96   CALCIUM mg/dL 7 6* 7 6* 7 6* 8 0*   AST U/L  --  20 24 22   ALT U/L  --  21 24 25   ALK PHOS U/L  --  82 80 100     Results from last 7 days   Lab Units 06/30/19  0845 06/30/19  0826   BLOOD CULTURE  No Growth at 48 hrs  No Growth at 48 hrs

## 2019-07-03 NOTE — SOCIAL WORK
CM met with patient at bedside to discuss HOST/MARS and whether or not he was interested in ETOH treatment information  Patient declined this at this time stating "he has it under control and what he was doing was working just fine "      CM notified patient that contact information for MARS will be added to his AVS so if he is interested in ETOH treatment information after discharge that he has a resource  Patient was agreeable to this  CM department will continue to follow to assist with discharge coordination

## 2019-07-05 LAB — BACTERIA BLD CULT: NORMAL

## 2019-07-07 LAB
BACTERIA BLD CULT: ABNORMAL
GRAM STN SPEC: ABNORMAL

## 2019-08-30 ENCOUNTER — TELEPHONE (OUTPATIENT)
Dept: GASTROENTEROLOGY | Facility: AMBULARY SURGERY CENTER | Age: 49
End: 2019-08-30

## 2019-08-30 NOTE — TELEPHONE ENCOUNTER
----- Message from Anabel Echevarria PA-C sent at 8/30/2019  8:06 AM EDT -----  Regarding: mri  Please let patient know he is overdue to get MRI completed, this is ordered

## 2019-08-30 NOTE — TELEPHONE ENCOUNTER
lmom for pt to contact central scheduling to set up MRI appt   Script place in the mail to pt address on file

## 2019-09-16 ENCOUNTER — TELEPHONE (OUTPATIENT)
Dept: GASTROENTEROLOGY | Facility: CLINIC | Age: 49
End: 2019-09-16

## 2019-09-16 NOTE — TELEPHONE ENCOUNTER
----- Message from Roselyn Farley PA-C sent at 9/16/2019  1:13 PM EDT -----  Pattient needs MRI completed, please call patient     ----- Message -----  From: SYSTEM  Sent: 9/13/2019  12:12 AM EDT  To: Roselyn Farley PA-C

## 2019-09-16 NOTE — TELEPHONE ENCOUNTER
lmom relaying the number for central Scheduling along with office number to call back if there's any questions